# Patient Record
Sex: MALE | Race: WHITE
[De-identification: names, ages, dates, MRNs, and addresses within clinical notes are randomized per-mention and may not be internally consistent; named-entity substitution may affect disease eponyms.]

---

## 2017-12-12 ENCOUNTER — HOSPITAL ENCOUNTER (OUTPATIENT)
Dept: HOSPITAL 75 - RAD | Age: 47
End: 2017-12-12
Attending: NURSE PRACTITIONER
Payer: SELF-PAY

## 2017-12-12 DIAGNOSIS — G62.9: ICD-10-CM

## 2017-12-12 DIAGNOSIS — M25.511: ICD-10-CM

## 2017-12-12 DIAGNOSIS — M25.512: ICD-10-CM

## 2017-12-12 DIAGNOSIS — M54.2: Primary | ICD-10-CM

## 2017-12-12 PROCEDURE — 72141 MRI NECK SPINE W/O DYE: CPT

## 2017-12-12 NOTE — DIAGNOSTIC IMAGING REPORT
PROCEDURE: MR imaging cervical spine without contrast.



TECHNIQUE: Multiplanar, multisequence MR imaging of the cervical

spine was performed without contrast.



INDICATION: Neck pain.



FINDINGS:

There is satisfactory alignment of the posterior spinal line. The

vertebral body heights are preserved. There is a T2 hyperintense

lesion seen within C4 vertebral body measuring 0.6 cm in size.

This is associated with isointense T1 signal and is favored to be

a vertebral body hemangioma. There is disc desiccation at all

levels. The spinal cord has normal caliber, contour and signal.



The foramen magnum and upper cervical canal are widely patent.



C2/3: There is no disc herniation, no spinal canal stenosis. The

foramina demonstrates mild narrowing on the left and no

significant narrowing on the right side.



C3/4: There is no disc herniation and no spinal canal stenosis.

There is uncovertebral and facet hypertrophy resulting in mild

foraminal stenosis bilaterally.



C4/5: There is no disc herniation. No spinal canal stenosis. The

foramina demonstrate no significant stenosis.



C5/6: There is no significant disc herniation. No spinal canal

stenosis. No foraminal narrowing.



C6/7: There is a spur disc complex with minimal spinal canal

stenosis reducing the AP dimension of the canal to 9.5 mm. No

cord compression. There is foraminal stenosis of moderate degree

on the left and minimal stenosis on the right side.



C7/T1: No disc herniation, no spinal canal or foraminal stenosis.



IMPRESSION: No high-grade stenosis or cord compression at any

level.



Dictated by: 



  Dictated on workstation # [id number]

## 2020-02-07 ENCOUNTER — HOSPITAL ENCOUNTER (EMERGENCY)
Dept: HOSPITAL 75 - ER | Age: 50
LOS: 1 days | Discharge: ANOTHER HOSPITAL | End: 2020-02-08
Payer: SELF-PAY

## 2020-02-07 VITALS — DIASTOLIC BLOOD PRESSURE: 99 MMHG | SYSTOLIC BLOOD PRESSURE: 151 MMHG

## 2020-02-07 VITALS — DIASTOLIC BLOOD PRESSURE: 115 MMHG | SYSTOLIC BLOOD PRESSURE: 159 MMHG

## 2020-02-07 VITALS — HEIGHT: 68.9 IN | WEIGHT: 273.37 LBS | BODY MASS INDEX: 40.49 KG/M2

## 2020-02-07 VITALS — SYSTOLIC BLOOD PRESSURE: 160 MMHG | DIASTOLIC BLOOD PRESSURE: 90 MMHG

## 2020-02-07 DIAGNOSIS — A41.9: Primary | ICD-10-CM

## 2020-02-07 DIAGNOSIS — J36: ICD-10-CM

## 2020-02-07 DIAGNOSIS — J39.8: ICD-10-CM

## 2020-02-07 LAB
ALBUMIN SERPL-MCNC: 4.1 GM/DL (ref 3.2–4.5)
ALP SERPL-CCNC: 95 U/L (ref 40–136)
ALT SERPL-CCNC: 17 U/L (ref 0–55)
AMORPH SED URNS QL MICRO: (no result) /LPF
APTT BLD: 28 SEC (ref 24–35)
APTT PPP: YELLOW S
BACTERIA #/AREA URNS HPF: (no result) /HPF
BASOPHILS # BLD AUTO: 0 10^3/UL (ref 0–0.1)
BASOPHILS NFR BLD AUTO: 0 % (ref 0–10)
BILIRUB SERPL-MCNC: 1.5 MG/DL (ref 0.1–1)
BILIRUB UR QL STRIP: (no result)
BUN/CREAT SERPL: 7
CALCIUM SERPL-MCNC: 9.3 MG/DL (ref 8.5–10.1)
CHLORIDE SERPL-SCNC: 100 MMOL/L (ref 98–107)
CO2 SERPL-SCNC: 22 MMOL/L (ref 21–32)
CREAT SERPL-MCNC: 0.94 MG/DL (ref 0.6–1.3)
EOSINOPHIL # BLD AUTO: 0 10^3/UL (ref 0–0.3)
EOSINOPHIL NFR BLD AUTO: 0 % (ref 0–10)
ERYTHROCYTE [DISTWIDTH] IN BLOOD BY AUTOMATED COUNT: 13.5 % (ref 10–14.5)
FIBRINOGEN PPP-MCNC: CLEAR MG/DL
GFR SERPLBLD BASED ON 1.73 SQ M-ARVRAT: > 60 ML/MIN
GLUCOSE SERPL-MCNC: 350 MG/DL (ref 70–105)
GLUCOSE UR STRIP-MCNC: (no result) MG/DL
HCT VFR BLD CALC: 46 % (ref 40–54)
HGB BLD-MCNC: 16 G/DL (ref 13.3–17.7)
INR PPP: 1.2 (ref 0.8–1.4)
KETONES UR QL STRIP: (no result)
LEUKOCYTE ESTERASE UR QL STRIP: NEGATIVE
LYMPHOCYTES # BLD AUTO: 2.3 X 10^3 (ref 1–4)
LYMPHOCYTES NFR BLD AUTO: 14 % (ref 12–44)
MANUAL DIFFERENTIAL PERFORMED BLD QL: YES
MCH RBC QN AUTO: 28 PG (ref 25–34)
MCHC RBC AUTO-ENTMCNC: 35 G/DL (ref 32–36)
MCV RBC AUTO: 81 FL (ref 80–99)
MONOCYTES # BLD AUTO: 1.7 X 10^3 (ref 0–1)
MONOCYTES NFR BLD AUTO: 11 % (ref 0–12)
MONOCYTES NFR BLD: 10 %
NEUTROPHILS # BLD AUTO: 12.5 X 10^3 (ref 1.8–7.8)
NEUTROPHILS NFR BLD AUTO: 76 % (ref 42–75)
NEUTS BAND NFR BLD MANUAL: 80 %
NITRITE UR QL STRIP: NEGATIVE
PH UR STRIP: 5.5 [PH] (ref 5–9)
PLATELET # BLD: 341 10^3/UL (ref 130–400)
PMV BLD AUTO: 10.3 FL (ref 7.4–10.4)
POTASSIUM SERPL-SCNC: 3.4 MMOL/L (ref 3.6–5)
PROT SERPL-MCNC: 8.1 GM/DL (ref 6.4–8.2)
PROT UR QL STRIP: (no result)
PROTHROMBIN TIME: 15.2 SEC (ref 12.2–14.7)
RBC #/AREA URNS HPF: (no result) /HPF
RBC MORPH BLD: NORMAL
SODIUM SERPL-SCNC: 135 MMOL/L (ref 135–145)
SP GR UR STRIP: 1.02 (ref 1.02–1.02)
VARIANT LYMPHS NFR BLD MANUAL: 10 %
WBC # BLD AUTO: 16.6 10^3/UL (ref 4.3–11)
WBC #/AREA URNS HPF: (no result) /HPF

## 2020-02-07 PROCEDURE — 82962 GLUCOSE BLOOD TEST: CPT

## 2020-02-07 PROCEDURE — 81000 URINALYSIS NONAUTO W/SCOPE: CPT

## 2020-02-07 PROCEDURE — 36415 COLL VENOUS BLD VENIPUNCTURE: CPT

## 2020-02-07 PROCEDURE — 96375 TX/PRO/DX INJ NEW DRUG ADDON: CPT

## 2020-02-07 PROCEDURE — 85027 COMPLETE CBC AUTOMATED: CPT

## 2020-02-07 PROCEDURE — 85610 PROTHROMBIN TIME: CPT

## 2020-02-07 PROCEDURE — 71045 X-RAY EXAM CHEST 1 VIEW: CPT

## 2020-02-07 PROCEDURE — 96372 THER/PROPH/DIAG INJ SC/IM: CPT

## 2020-02-07 PROCEDURE — 96367 TX/PROPH/DG ADDL SEQ IV INF: CPT

## 2020-02-07 PROCEDURE — 96365 THER/PROPH/DIAG IV INF INIT: CPT

## 2020-02-07 PROCEDURE — 80053 COMPREHEN METABOLIC PANEL: CPT

## 2020-02-07 PROCEDURE — 83605 ASSAY OF LACTIC ACID: CPT

## 2020-02-07 PROCEDURE — 87040 BLOOD CULTURE FOR BACTERIA: CPT

## 2020-02-07 PROCEDURE — 85730 THROMBOPLASTIN TIME PARTIAL: CPT

## 2020-02-07 PROCEDURE — 70491 CT SOFT TISSUE NECK W/DYE: CPT

## 2020-02-07 PROCEDURE — 96361 HYDRATE IV INFUSION ADD-ON: CPT

## 2020-02-07 PROCEDURE — 87088 URINE BACTERIA CULTURE: CPT

## 2020-02-07 PROCEDURE — 85007 BL SMEAR W/DIFF WBC COUNT: CPT

## 2020-02-07 NOTE — DIAGNOSTIC IMAGING REPORT
Indication: Pharyngitis



Portable upright AP view of the chest is obtained.



COMPARISON: No previous study is available for comparison at this

time.



FINDINGS: Heart size and pulmonary vasculature are within normal

limits, and the lungs are clear, bilaterally.  



IMPRESSION: Unremarkable chest.   



Dictated by: 



  Dictated on workstation # [id number]

## 2020-02-07 NOTE — DIAGNOSTIC IMAGING REPORT
PROCEDURE: CT neck soft tissue with contrast.



TECHNIQUE: Multiple contiguous axial images were obtained through

the neck after the administration of contrast. Auto Exposure

Controls were utilized during the CT exam to meet ALARA standards

for radiation dose reduction. 



INDICATION: Pharyngitis and fever.



FINDINGS: The findings are unremarkable at the skull base.

Adenoidal tissue and the uvula are mildly prominent. The

epiglottis is unremarkable. No oropharyngeal abnormality is

identified; however, there is moderate mural thickening to the

right of midline at the hypopharynx which effaces the right

piriform sinus and causes leftward deviation and distortion of

the airway to the level of the vocal cords. Area of presumed

cellulitis and phlegmon measures approximately 3.9 x 1.9 cm with

several tiny low-density foci which could represent very early

abscesses. There are also prominent deep cervical lymph nodes,

greater on the right. Right retro-mandibular nodule reaches 1.2

cm in diameter. Great vessels in the neck appear to be widely

patent. No other focal inflammation or organized fluid collection

is identified.



IMPRESSION: Right hypopharyngeal cellulitis with probable

phlegmon and possible early abscess formation distorting the

right piriform sinus and airway at the level of the vestibule.

Vocal cords appear to be intact. Direct visualization would be of

use. 



Dictated by: 



  Dictated on workstation # [id number]

## 2020-02-07 NOTE — NUR
informed pt/family of anticipated wait time for recieving bed assignment from 
[facility]. no needs at this time. bp cuff exchanged. call light in reach.

## 2020-02-07 NOTE — ED EENT
History of Present Illness


General


Chief Complaint:  Oral/Throat Problems


Stated Complaint:  THROAT PROBLEMS


Nursing Triage Note:  


patient complaint of abscess of throat, [facility] walk in clinic. recieved antibiotic 


shot, and po antibiotics. pt trouble swalling, trouble breathing. stated rt 


side, swelling. Fever of 102 at home


Source:  patient, spouse


Exam Limitations:  no limitations





History of Present Illness


Date Seen by Provider:  2020


Time Seen by Provider:  17:51


Initial Comments


The patient presents to ER from home with his wife and chief complaint of 2 days

of progressively worsening swelling and pain 8 out of 10 presently in his right 

anterior throat causing pressure on his windpipe and making it impossible to 

swallow his own secretions since this afternoon. Yesterday the [facility] urgent care 

prescribed him Augmentin with return precautions. Since he developed fever 101 

this afternoon he went to the ER per instructions. No previous cysts or 

abscesses. He has Crohn's not on any meds. He had Augmenting 1 tablet this AM 

and last oral intake was around noon. No local GI Dr. Never been to ENT. No SOA.

Vocal changes since yesterday. Had a cholecystectomoy and appendectomy. Not on 

any other meds. PCP at [facility].





Allergies and Home Medications


Allergies


Coded Allergies:  


     No Known Drug Allergies (Unverified , 13)





Patient Home Medication List


Home Medication List Reviewed:  Yes





Review of Systems


Review of Systems


Constitutional:  chills; No diaphoresis; fever, malaise


Eyes:  Denies Blindness, Denies Blurred Vision


Ears:  Denies Dizziness, Denies Pain


Nose:  denies clots, denies congestion


Mouth:  see HPI; denies clots, denies loose teeth


Throat:  pain, swelling (r); denies discharge; hoarse, painful swallowing, 

difficulty with fluids


Respiratory:  No cough, No short of breath


Cardiovascular:  No chest pain, No edema


Gastrointestinal:  No abdominal pain, No nausea


Neurological:  Denies Depressed





All Other Systems Reviewed


Negative Unless Noted:  Yes





Past Medical-Social-Family Hx


Patient Social History


Alcohol Use:  Denies Use


Recreational Drug Use:  No


Smoking Status:  Never a Smoker


Recent Foreign Travel:  No


Contact w/Someone Who Travel:  No


Recent Infectious Disease Expo:  No





Past Medical History


Reproductive Disorders:  No


Sexually Transmitted Disease:  No


HIV/AIDS:  No


Crohns Disease


Adverse Reaction/Blood Tranf:  No





Physical Exam


Vital Signs





Vital Signs - First Documented








 20





 17:45


 


Temp 37.2


 


Pulse 150


 


Resp 20








Height, Weight, BMI


Height: '"


Weight: lbs. oz. kg; 40.00 BMI


Method:


General Appearance:  WD/WN, mild distress


Eyes:  bilateral eye normal inspection, bilateral eye PERRL, bilateral eye EOMI


Ears:  bilateral ear auricle normal, bilateral ear canal normal, bilateral ear 

TM normal


Nose:  normal inspection; No active bleeding, No discharge


Mouth/Throat:  normal mouth inspection, pharynx normal, dental tenderness; No 

excessive drooling


Neck:  lymphadenopathy (R), tender lateral (r)


Cardiovascular:  normal peripheral pulses, regular rate, rhythm, no edema


Respiratory:  lungs clear, normal breath sounds, no respiratory distress, no 

accessory muscle use


Neurologic/Psychiatric:  alert, normal mood/affect, oriented x 3


Skin:  normal color, warm/dry





Progress/Results/Core Measures


Results/Orders


Lab Results





Laboratory Tests








Test


 20


18:05 20


19:13 20


20:20 20


00:42 Range/Units


 


 


White Blood Count


 16.6 H


 


 


 


 4.3-11.0


10^3/uL


 


Red Blood Count


 5.66 


 


 


 


 4.35-5.85


10^6/uL


 


Hemoglobin 16.0     13.3-17.7  G/DL


 


Hematocrit 46     40-54  %


 


Mean Corpuscular Volume 81     80-99  FL


 


Mean Corpuscular Hemoglobin 28     25-34  PG


 


Mean Corpuscular Hemoglobin


Concent 35 


 


 


 


 32-36  G/DL





 


Red Cell Distribution Width 13.5     10.0-14.5  %


 


Platelet Count


 341 


 


 


 


 130-400


10^3/uL


 


Mean Platelet Volume 10.3     7.4-10.4  FL


 


Neutrophils (%) (Auto) 76 H    42-75  %


 


Lymphocytes (%) (Auto) 14     12-44  %


 


Monocytes (%) (Auto) 11     0-12  %


 


Eosinophils (%) (Auto) 0     0-10  %


 


Basophils (%) (Auto) 0     0-10  %


 


Neutrophils # (Auto) 12.5 H    1.8-7.8  X 10^3


 


Lymphocytes # (Auto) 2.3     1.0-4.0  X 10^3


 


Monocytes # (Auto) 1.7 H    0.0-1.0  X 10^3


 


Eosinophils # (Auto)


 0.0 


 


 


 


 0.0-0.3


10^3/uL


 


Basophils # (Auto)


 0.0 


 


 


 


 0.0-0.1


10^3/uL


 


Neutrophils % (Manual) 80      %


 


Lymphocytes % (Manual) 10      %


 


Monocytes % (Manual) 10      %


 


Blood Morphology Comment NORMAL      


 


Prothrombin Time 15.2 H    12.2-14.7  SEC


 


INR Comment 1.2     0.8-1.4  


 


Activated Partial


Thromboplast Time 28 


 


 


 


 24-35  SEC





 


Sodium Level 135     135-145  MMOL/L


 


Potassium Level 3.4 L    3.6-5.0  MMOL/L


 


Chloride Level 100       MMOL/L


 


Carbon Dioxide Level 22     21-32  MMOL/L


 


Anion Gap 13     5-14  MMOL/L


 


Blood Urea Nitrogen 7     7-18  MG/DL


 


Creatinine


 0.94 


 


 


 


 0.60-1.30


MG/DL


 


Estimat Glomerular Filtration


Rate > 60 


 


 


 


  





 


BUN/Creatinine Ratio 7      


 


Glucose Level 350 H      MG/DL


 


Lactic Acid Level


 2.18 *H


 


 1.57 


 


 0.50-2.00


MMOL/L


 


Calcium Level 9.3     8.5-10.1  MG/DL


 


Corrected Calcium 9.2     8.5-10.1  MG/DL


 


Total Bilirubin 1.5 H    0.1-1.0  MG/DL


 


Aspartate Amino Transf


(AST/SGOT) 13 


 


 


 


 5-34  U/L





 


Alanine Aminotransferase


(ALT/SGPT) 17 


 


 


 


 0-55  U/L





 


Alkaline Phosphatase 95       U/L


 


Total Protein 8.1     6.4-8.2  GM/DL


 


Albumin 4.1     3.2-4.5  GM/DL


 


Urine Color  YELLOW     


 


Urine Clarity  CLEAR     


 


Urine pH  5.5    5-9  


 


Urine Specific Gravity  1.025 H   1.016-1.022  


 


Urine Protein  1+ H   NEGATIVE  


 


Urine Glucose (UA)  2+ H   NEGATIVE  


 


Urine Ketones  1+ H   NEGATIVE  


 


Urine Nitrite  NEGATIVE    NEGATIVE  


 


Urine Bilirubin  1+ H   NEGATIVE  


 


Urine Urobilinogen  1.0    < = 1.0  MG/DL


 


Urine Leukocyte Esterase  NEGATIVE    NEGATIVE  


 


Urine RBC (Auto)  TRACE-I    NEGATIVE  


 


Urine RBC  0-2     /HPF


 


Urine WBC  0-2     /HPF


 


Urine Crystals  PRESENT H    /LPF


 


Urine Amorphous Sediment


 


 FEW ZHANNA


URATES H 


 


  /LPF





 


Urine Bacteria  TRACE     /HPF


 


Urine Casts  NONE     /LPF


 


Urine Mucus  MODERATE H    /LPF


 


Urine Culture Indicated  NO     








My Orders





Orders - PABLO BARROSO


Ct Neck (Soft Tissue) W (20 17:57)


Cbc With Automated Diff (20 17:57)


Comprehensive Metabolic Panel (20 17:57)


Blood Culture (20 17:57)


Sputum Culture (20 17:57)


Urinalysis (20 17:57)


Urine Culture (20 17:57)


Protime With Inr (20 17:57)


Partial Thromboplastin Time (20 17:57)


Chest 1 View, Ap/Pa Only (20 17:57)


Ed Iv/Invasive Line Start (20 17:57)


Ed Iv/Invasive Line Start (20 17:57)


Vital Signs Adult Sepsis Patie Q15M (20 17:57)


O2 (20 17:57)


Remove Rings In Anticipation O (20 17:57)


Lactic Acid Analyzer (20 17:57)


Ns Iv 1000 Ml (Sodium Chloride 0.9%) (20 17:57)


Piperacillin Sodium/Tazobactam (Zosyn Vi (20 18:00)


Vancomycin Injection (Vancomycin Injecti (20 18:00)


Ed Iv/Invasive Line Start (20 17:57)


Ns Iv 1000 Ml (Sodium Chloride 0.9%) (20 17:57)


Fentanyl  Injection (Sublimaze Injection (20 18:00)


Manual Differential (20 18:05)


Insulin (Regular) Human (Humulin R (Per (20 18:45)


Iohexol Injection (Omnipaque 350 Mg/Ml 1 (20 19:00)


Received Contrast (Hold Metformin- Contr (20 19:00)


Ns (Ivpb) (Sodium Chloride 0.9% Ivpb Bag (20 19:00)


Iohexol Injection (Omnipaque 350 Mg/Ml 1 (20 19:45)


Received Contrast (Hold Metformin- Contr (20 19:45)


Ns (Ivpb) (Sodium Chloride 0.9% Ivpb Bag (20 19:45)


Ketorolac Injection (Toradol Injection) (20 21:15)


Dexamethasone Injection (Decadron Inject (20 22:00)


Ns W/Kcl 20 Meq/L (Ns Iv W/Kcl 20 Meq/L) (20 22:30)


Accucheck Stat ONCE (20 00:43)





Medications Given in ED





Current Medications








 Medications  Dose


 Ordered  Sig/Iris


 Route  Start Time


 Stop Time Status Last Admin


Dose Admin


 


 Dexamethasone


 Sodium Phosphate  10 mg  ONCE  ONCE


 IV  20 22:00


 20 22:01 DC 20 22:03


10 MG


 


 Fentanyl Citrate  50 mcg  ONCE  ONCE


 IVP  20 18:00


 20 18:03 DC 20 18:21


50 MCG


 


 Insulin Human


 Regular  5 unit  ONCE  ONCE


 SC  20 18:45


 20 18:46 DC 20 18:58


5 UNIT


 


 Iohexol  75 ml  ONCE  ONCE


 IV  20 19:45


 20 19:46 DC 20 19:45


75 ML


 


 Ketorolac


 Tromethamine  30 mg  ONCE  ONCE


 IVP  20 21:15


 20 21:16 DC 20 21:26


30 MG


 


 Piperacillin Sod/


 Tazobactam Sod


 4.5 gm/Sodium


 Chloride  100 ml @ 


 200 mls/hr  ONCE  ONCE


 IV  20 18:00


 20 18:29 DC 20 18:58


200 MLS/HR


 


 Potassium


 Chloride/Sodium


 Chloride  1,000 ml @ 


 250 mls/hr  Q4H ONCE


 IV  20 22:30


 20 02:29  20 22:57


250 MLS/HR


 


 Sodium Chloride  100 ml  ONCE  ONCE


 IV  20 19:45


 20 19:46 DC 20 19:46


80 ML


 


 Vancomycin HCl


 1000 mg/Sodium


 Chloride  250 ml @ 


 250 mls/hr  ONCE  ONCE


 IV  20 18:00


 20 18:59 DC 20 19:50


250 MLS/HR








Vital Signs/I&O











 20





 17:45 19:04 19:04 20:14


 


Temp 37.2   


 


Pulse 150 137 137 125


 


Resp 20 17 17 22


 


B/P (MAP)  159/115 159/115 (130) 151/99 (116)


 


Pulse Ox  93 93 93


 


O2 Delivery  Room Air Room Air Room Air


 


    





 20  





 22:05 22:26  


 


Temp  37.6  


 


Pulse 124 112  


 


Resp 19 20  


 


B/P (MAP) 160/90 (113) 160/90  


 


Pulse Ox 95 95  


 


O2 Delivery Room Air Room Air  











Progress


Progress Note #1:  


   Time:  18:14


Progress Note


Febrile tachycardia: Septic workup. 2 L based on an ideal body weight of 92 kg 

adjusted. We'll select Zosyn and vancomycin and get a CT of the neck with IV 

contrast. Fentanyl 50 migrans for pain.


Progress Note #2:  


   Time:  21:13


Progress Note


The patient felt feverish so staff checked his temperature at 102.9 tympanic. We

have offered rectal Tylenol or Toradol noting there is a small possibility this 

could irritate his Crohn's. He has been in remission with his Crohn's for many 

years and not on any medications to control it and he has stated he would prefer

Toradol.


Progress Note #3:  


   Time:  21:55


Progress Note


Patient was seen and examined by ear nose and throat surgeon Dr. Medrano who feels

like the phlegmon is not amenable to surgical drainage at this time because the 

sepsis would recommend hospitalization. He would like since the patient is 

having some airway compression and vocal changes to have the patient in the ICU.

Unfortunately we are on ICU diversion. We will seek care at [place], [place].





The patient is comfortable at this time.





Diagnostic Imaging





   Diagonstic Imaging:  CT (With IV contrast)


   Plain Films/CT/US/NM/MRI:  other (soft tissue neck)


Comments


NAME:   MURRAY PICKETT


MED REC#:   [id number]


ACCOUNT#:   [id number]


PT STATUS:   REG ER


:   1970


PHYSICIAN:   PABLO BARROSO MD


ADMIT DATE:   20/ER


                                  ***Signed***


Date of Exam:20





CT NECK (SOFT TISSUE) W





PROCEDURE: CT neck soft tissue with contrast.





TECHNIQUE: Multiple contiguous axial images were obtained through


the neck after the administration of contrast. Auto Exposure


Controls were utilized during the CT exam to meet ALARA standards


for radiation dose reduction. 





INDICATION: Pharyngitis and fever.





FINDINGS: The findings are unremarkable at the skull base.


Adenoidal tissue and the uvula are mildly prominent. The


epiglottis is unremarkable. No oropharyngeal abnormality is


identified; however, there is moderate mural thickening to the


right of midline at the hypopharynx which effaces the right


piriform sinus and causes leftward deviation and distortion of


the airway to the level of the vocal cords. Area of presumed


cellulitis and phlegmon measures approximately 3.9 x 1.9 cm with


several tiny low-density foci which could represent very early


abscesses. There are also prominent deep cervical lymph nodes,


greater on the right. Right retro-mandibular nodule reaches 1.2


cm in diameter. Great vessels in the neck appear to be widely


patent. No other focal inflammation or organized fluid collection


is identified.





IMPRESSION: Right hypopharyngeal cellulitis with probable


phlegmon and possible early abscess formation distorting the


right piriform sinus and airway at the level of the vestibule.


Vocal cords appear to be intact. Direct visualization would be of


use. 





Dictated by: 





  Dictated on workstation # [id number]





Dict:   20


Trans:   20


PJE [id number]





Interpreted by:     VANGIE PERRY MD


Electronically signed by: VANGIE PERRY MD 20


   Reviewed:  Reviewed by Me








   Diagonstic Imaging:  Xray


   Plain Films/CT/US/NM/MRI:  chest


Comments


NAME:   MURRAY PICKETT


MED REC#:   [id number]


ACCOUNT#:   [id number]


PT STATUS:   REG ER


:   1970


PHYSICIAN:   PABLO BARROSO MD


ADMIT DATE:   20/ER


                                  ***Signed***


Date of Exam:20





CHEST 1 VIEW, AP/PA ONLY





Indication: Pharyngitis





Portable upright AP view of the chest is obtained.





COMPARISON: No previous study is available for comparison at this


time.





FINDINGS: Heart size and pulmonary vasculature are within normal


limits, and the lungs are clear, bilaterally.  





IMPRESSION: Unremarkable chest.   





Dictated by: 





  Dictated on workstation # [id number]





Dict:   20


Trans:   20


TF [id number]





Interpreted by:     VANGIE PERRY MD


Electronically signed by: VANGIE PERRY MD 20


   Reviewed:  Reviewed by Me


Consults :  


   Consulting Physician:  SUKHDEEP MEDRANO MD


Consults Notes


: Discussed the case with Dr. Medrano, ear nose and throat surgeon and he 

agrees to come see the patient.





Departure


Impression





   Primary Impression:  


   Peritonsillar cellulitis


   Additional Impressions:  


   Tracheal deviation


   Sepsis


   Qualified Codes:  A41.9 - Sepsis, unspecified organism


Disposition:   XFER SHT-TRM HOSP


Condition:  Stable





Transfer


Transfer Reason:  Diversion (ICU)


Time Spoke to Accepting Phy:  22:30


Transfer Progress Notes


: Dr Briones, intensivist at [place], [place] says he is happy to 

consult but declined to admit. He wants us to go through ENT and make sure that 

they are willing to accept the patient. He said it would be reasonable for 

either ENT to admit or the hospitalist to admit and he is happy to consult.


: Discussed the case with Dr. Erazo, ear nose and throat surgeon. She agrees 

to consult on the patient but will not be admitting and defers to the 

hospitalist. She agrees with the plan thus far.


: Discussed the case with Dr. Vargas, hospitalist and he agrees to admit the

patient to the unit.


Transfer Time:  00:45


Transfer Facility:  


[place], [place]


Method of Transfer:  EMS





Departure-Patient Inst.


Referrals:  


[facility] (PCP/Family)


Primary Care Physician











PABLO BARROSO                  2020 18:09

## 2020-02-08 VITALS — SYSTOLIC BLOOD PRESSURE: 154 MMHG | DIASTOLIC BLOOD PRESSURE: 103 MMHG

## 2021-01-10 ENCOUNTER — HOSPITAL ENCOUNTER (INPATIENT)
Dept: HOSPITAL 75 - ER | Age: 51
LOS: 9 days | Discharge: HOME | DRG: 871 | End: 2021-01-19
Attending: INTERNAL MEDICINE | Admitting: INTERNAL MEDICINE
Payer: SELF-PAY

## 2021-01-10 VITALS — WEIGHT: 302.25 LBS | BODY MASS INDEX: 40.06 KG/M2 | HEIGHT: 72.99 IN

## 2021-01-10 VITALS — DIASTOLIC BLOOD PRESSURE: 59 MMHG | SYSTOLIC BLOOD PRESSURE: 125 MMHG

## 2021-01-10 DIAGNOSIS — E87.6: ICD-10-CM

## 2021-01-10 DIAGNOSIS — R45.1: ICD-10-CM

## 2021-01-10 DIAGNOSIS — H66.93: ICD-10-CM

## 2021-01-10 DIAGNOSIS — R32: ICD-10-CM

## 2021-01-10 DIAGNOSIS — Z79.2: ICD-10-CM

## 2021-01-10 DIAGNOSIS — I10: ICD-10-CM

## 2021-01-10 DIAGNOSIS — A41.9: Primary | ICD-10-CM

## 2021-01-10 DIAGNOSIS — U07.1: ICD-10-CM

## 2021-01-10 DIAGNOSIS — Z91.19: ICD-10-CM

## 2021-01-10 DIAGNOSIS — N17.9: ICD-10-CM

## 2021-01-10 DIAGNOSIS — E86.0: ICD-10-CM

## 2021-01-10 DIAGNOSIS — E66.9: ICD-10-CM

## 2021-01-10 DIAGNOSIS — E11.10: ICD-10-CM

## 2021-01-10 DIAGNOSIS — J96.01: ICD-10-CM

## 2021-01-10 DIAGNOSIS — G47.33: ICD-10-CM

## 2021-01-10 DIAGNOSIS — J12.82: ICD-10-CM

## 2021-01-10 LAB
ALBUMIN SERPL-MCNC: 3.7 GM/DL (ref 3.2–4.5)
ALP SERPL-CCNC: 68 U/L (ref 40–136)
ALT SERPL-CCNC: 31 U/L (ref 0–55)
AMYLASE SERPL-CCNC: 26 U/L (ref 25–125)
APTT BLD: 28 SEC (ref 24–35)
APTT PPP: YELLOW S
ARTERIAL PATENCY WRIST A: (no result)
BACTERIA #/AREA URNS HPF: (no result) /HPF
BARBITURATES UR QL: NEGATIVE
BASE EXCESS STD BLDA CALC-SCNC: -2.2 MMOL/L (ref -2.5–2.5)
BASOPHILS # BLD AUTO: 0 10^3/UL (ref 0–0.1)
BASOPHILS NFR BLD AUTO: 0 % (ref 0–10)
BDY SITE: (no result)
BENZODIAZ UR QL SCN: NEGATIVE
BILIRUB SERPL-MCNC: 0.8 MG/DL (ref 0.1–1)
BILIRUB UR QL STRIP: NEGATIVE
BODY TEMPERATURE: 38.4
BUN/CREAT SERPL: 13
CALCIUM SERPL-MCNC: 8.8 MG/DL (ref 8.5–10.1)
CHLORIDE SERPL-SCNC: 99 MMOL/L (ref 98–107)
CK MB SERPL-MCNC: 0.5 NG/ML (ref ?–6.6)
CK SERPL-CCNC: 229 U/L (ref 30–200)
CO2 BLDA CALC-SCNC: 22.3 MMOL/L (ref 21–31)
CO2 SERPL-SCNC: 21 MMOL/L (ref 21–32)
COCAINE UR QL: NEGATIVE
CREAT SERPL-MCNC: 1.52 MG/DL (ref 0.6–1.3)
D DIMER PPP FEU-MCNC: 0.99 UG/ML (ref 0–0.49)
EOSINOPHIL # BLD AUTO: 0 10^3/UL (ref 0–0.3)
EOSINOPHIL NFR BLD AUTO: 0 % (ref 0–10)
ERYTHROCYTE [SEDIMENTATION RATE] IN BLOOD: 20 MM/HR (ref 0–30)
FIBRINOGEN PPP-MCNC: (no result) MG/DL
GFR SERPLBLD BASED ON 1.73 SQ M-ARVRAT: 49 ML/MIN
GLUCOSE SERPL-MCNC: 402 MG/DL (ref 70–105)
GLUCOSE UR STRIP-MCNC: (no result) MG/DL
HCT VFR BLD CALC: 49 % (ref 40–54)
HGB BLD-MCNC: 16.5 G/DL (ref 13.3–17.7)
HYALINE CASTS #/AREA URNS LPF: (no result) /LPF
INHALED O2 FLOW RATE: (no result) L/MIN
INR PPP: 1.2 (ref 0.8–1.4)
KETONES UR QL STRIP: NEGATIVE
LEUKOCYTE ESTERASE UR QL STRIP: NEGATIVE
LIPASE SERPL-CCNC: 41 U/L (ref 8–78)
LYMPHOCYTES # BLD AUTO: 1.3 10^3/UL (ref 1–4)
LYMPHOCYTES NFR BLD AUTO: 18 % (ref 12–44)
MAGNESIUM SERPL-MCNC: 2.2 MG/DL (ref 1.6–2.4)
MANUAL DIFFERENTIAL PERFORMED BLD QL: NO
MCH RBC QN AUTO: 28 PG (ref 25–34)
MCHC RBC AUTO-ENTMCNC: 34 G/DL (ref 32–36)
MCV RBC AUTO: 82 FL (ref 80–99)
METHADONE UR QL SCN: NEGATIVE
METHAMPHETAMINE SCREEN URINE S: NEGATIVE
MONOCYTES # BLD AUTO: 0.5 10^3/UL (ref 0–1)
MONOCYTES NFR BLD AUTO: 7 % (ref 0–12)
NEUTROPHILS # BLD AUTO: 5.3 10^3/UL (ref 1.8–7.8)
NEUTROPHILS NFR BLD AUTO: 75 % (ref 42–75)
NITRITE UR QL STRIP: NEGATIVE
OPIATES UR QL SCN: NEGATIVE
OXYCODONE UR QL: NEGATIVE
PCO2 BLDA: 34 MMHG (ref 35–45)
PH BLDA: 7.42 [PH] (ref 7.37–7.43)
PH UR STRIP: 6 [PH] (ref 5–9)
PLATELET # BLD: 290 10^3/UL (ref 130–400)
PMV BLD AUTO: 11.5 FL (ref 9–12.2)
PO2 BLDA: 56 MMHG (ref 79–93)
POTASSIUM SERPL-SCNC: 2.6 MMOL/L (ref 3.6–5)
PROPOXYPH UR QL: NEGATIVE
PROT SERPL-MCNC: 7.9 GM/DL (ref 6.4–8.2)
PROT UR QL STRIP: (no result)
PROTHROMBIN TIME: 15.2 SEC (ref 12.2–14.7)
RBC #/AREA URNS HPF: (no result) /HPF
SAO2 % BLDA FROM PO2: 85 % (ref 94–100)
SODIUM SERPL-SCNC: 136 MMOL/L (ref 135–145)
SP GR UR STRIP: 1.01 (ref 1.02–1.02)
SQUAMOUS #/AREA URNS HPF: (no result) /HPF
TRICYCLICS UR QL SCN: NEGATIVE
VENTILATION MODE VENT: NO
WBC # BLD AUTO: 7.1 10^3/UL (ref 4.3–11)
WBC #/AREA URNS HPF: (no result) /HPF

## 2021-01-10 PROCEDURE — 82550 ASSAY OF CK (CPK): CPT

## 2021-01-10 PROCEDURE — 85610 PROTHROMBIN TIME: CPT

## 2021-01-10 PROCEDURE — 87040 BLOOD CULTURE FOR BACTERIA: CPT

## 2021-01-10 PROCEDURE — 96374 THER/PROPH/DIAG INJ IV PUSH: CPT

## 2021-01-10 PROCEDURE — 82962 GLUCOSE BLOOD TEST: CPT

## 2021-01-10 PROCEDURE — 94660 CPAP INITIATION&MGMT: CPT

## 2021-01-10 PROCEDURE — 82150 ASSAY OF AMYLASE: CPT

## 2021-01-10 PROCEDURE — 85025 COMPLETE CBC W/AUTO DIFF WBC: CPT

## 2021-01-10 PROCEDURE — 83615 LACTATE (LD) (LDH) ENZYME: CPT

## 2021-01-10 PROCEDURE — 82805 BLOOD GASES W/O2 SATURATION: CPT

## 2021-01-10 PROCEDURE — 85379 FIBRIN DEGRADATION QUANT: CPT

## 2021-01-10 PROCEDURE — 83690 ASSAY OF LIPASE: CPT

## 2021-01-10 PROCEDURE — 94760 N-INVAS EAR/PLS OXIMETRY 1: CPT

## 2021-01-10 PROCEDURE — 36569 INSJ PICC 5 YR+ W/O IMAGING: CPT

## 2021-01-10 PROCEDURE — 85730 THROMBOPLASTIN TIME PARTIAL: CPT

## 2021-01-10 PROCEDURE — 36600 WITHDRAWAL OF ARTERIAL BLOOD: CPT

## 2021-01-10 PROCEDURE — 86308 HETEROPHILE ANTIBODY SCREEN: CPT

## 2021-01-10 PROCEDURE — 96375 TX/PRO/DX INJ NEW DRUG ADDON: CPT

## 2021-01-10 PROCEDURE — 80306 DRUG TEST PRSMV INSTRMNT: CPT

## 2021-01-10 PROCEDURE — 71275 CT ANGIOGRAPHY CHEST: CPT

## 2021-01-10 PROCEDURE — 85652 RBC SED RATE AUTOMATED: CPT

## 2021-01-10 PROCEDURE — 76937 US GUIDE VASCULAR ACCESS: CPT

## 2021-01-10 PROCEDURE — 87081 CULTURE SCREEN ONLY: CPT

## 2021-01-10 PROCEDURE — 87430 STREP A AG IA: CPT

## 2021-01-10 PROCEDURE — 80053 COMPREHEN METABOLIC PANEL: CPT

## 2021-01-10 PROCEDURE — 83874 ASSAY OF MYOGLOBIN: CPT

## 2021-01-10 PROCEDURE — 71045 X-RAY EXAM CHEST 1 VIEW: CPT

## 2021-01-10 PROCEDURE — 94640 AIRWAY INHALATION TREATMENT: CPT

## 2021-01-10 PROCEDURE — 82728 ASSAY OF FERRITIN: CPT

## 2021-01-10 PROCEDURE — 86900 BLOOD TYPING SEROLOGIC ABO: CPT

## 2021-01-10 PROCEDURE — 83735 ASSAY OF MAGNESIUM: CPT

## 2021-01-10 PROCEDURE — 87804 INFLUENZA ASSAY W/OPTIC: CPT

## 2021-01-10 PROCEDURE — 87088 URINE BACTERIA CULTURE: CPT

## 2021-01-10 PROCEDURE — 85027 COMPLETE CBC AUTOMATED: CPT

## 2021-01-10 PROCEDURE — 84100 ASSAY OF PHOSPHORUS: CPT

## 2021-01-10 PROCEDURE — 86901 BLOOD TYPING SEROLOGIC RH(D): CPT

## 2021-01-10 PROCEDURE — 82010 KETONE BODYS QUAN: CPT

## 2021-01-10 PROCEDURE — 84145 PROCALCITONIN (PCT): CPT

## 2021-01-10 PROCEDURE — 94761 N-INVAS EAR/PLS OXIMETRY MLT: CPT

## 2021-01-10 PROCEDURE — 87635 SARS-COV-2 COVID-19 AMP PRB: CPT

## 2021-01-10 PROCEDURE — 83605 ASSAY OF LACTIC ACID: CPT

## 2021-01-10 PROCEDURE — 93005 ELECTROCARDIOGRAM TRACING: CPT

## 2021-01-10 PROCEDURE — 80048 BASIC METABOLIC PNL TOTAL CA: CPT

## 2021-01-10 PROCEDURE — 86141 C-REACTIVE PROTEIN HS: CPT

## 2021-01-10 PROCEDURE — 83880 ASSAY OF NATRIURETIC PEPTIDE: CPT

## 2021-01-10 PROCEDURE — 81000 URINALYSIS NONAUTO W/SCOPE: CPT

## 2021-01-10 PROCEDURE — 82553 CREATINE MB FRACTION: CPT

## 2021-01-10 PROCEDURE — 36415 COLL VENOUS BLD VENIPUNCTURE: CPT

## 2021-01-10 PROCEDURE — 83036 HEMOGLOBIN GLYCOSYLATED A1C: CPT

## 2021-01-10 PROCEDURE — 93041 RHYTHM ECG TRACING: CPT

## 2021-01-10 NOTE — DIAGNOSTIC IMAGING REPORT
INDICATION: Hypoxia and fever.



EXAMINATION: Frontal chest was obtained at 8:18 p.m. 



COMPARISON: 7:20 p.m.



FINDINGS: Heart is borderline in size. There are patchy

infiltrates in the mid to lower lung field on both sides

suspicious for atypical pneumonia. There is no pneumothorax or

pleural fluid.



IMPRESSION: Patchy groundglass infiltrates in the mid to lower

lung field on both sides is suspicious for atypical pneumonia.

Follow-up is recommended. 



Dictated by: 



  Dictated on workstation # [id number]

## 2021-01-10 NOTE — ED GENERAL
General


Chief Complaint:  Fever-Adult/Adol


Stated Complaint:  FEVER/LETHARGIC


Source of Information:  EMS, Old Records (ALL PMH IS FROM OLD RECORDS-ONE VISIT 

IN 2020 AND ONE VISIT IN 2013)


Exam Limitations:  Other (PT VERY LETHARGIC AND NOT TALKING MUCH)





History of Present Illness


Date Seen by Provider:  Wilber 10, 2021


Time Seen by Provider:  19:20


Initial Comments


PT ARRIVES VIA EMS FROM HOME


FAMILY CALLED EMS FOR PT WITH LETHARGY ALL DAY TODAY


PT REPORTEDLY WAS SEEN BY DR. RM YESTERDAY AND DX WITH BILATERAL EAR 

INFECTION AND GIVEN RX'S FOR AMOXIL, ANTIVERT, ZOFRAN


EMS REPORT THAT PT HAD TEMP .2--FAMILY UNAWARE, AND HAVE NOT GIVEN PT 

ANYTHING FOR SYMPTOMS


O2 SAT 86% ON ROOM AIR--NO REPORTED HISTORY OF RESPIRATORY PROBLEMS OR HX OF 

NEED FOR OXYGEN 


PT IS DIABETIC, BUT DOES NOT TAKE MEDICATION OR CHECK BLOOD GLUCOSE OR FOLLOW 

DIET--BLOOD GLUCOSE >300 FOR EMS


PT HAS HAD NAUSEA/VOMITING/DIARRHEA, BUT IS UNKNOWN HOW MANY EPISODES


PT WAS INCONTINENT OF URINE AT HOME





HAS NOT TAKEN ANYTHING FOR SYMPTOMS





NO OTHER INFORMATION IS OBTAINABLE AT THIS TIME. 





WIFE WAS CONTACTED LATER AND REPORTS THAT PT BEGAN HAVING SYMPTOMS APPROXIMATELY

1 WEEK AGO, WITH DECREASED INTAKE THE LAST 4-5 DAYS, AND TODAY VERY LETHARGIC 

AND COULD NOT GET OUT OF BED ALL DAY TODAY


NO REPORTED COUGH


UNAWARE THAT PT HAD FEVER. 


WIFE STATES HE DOES NOT GO TO DR/DOES NOT HAVE A DR. AND WILL NOT TAKE 

MEDICATION EVEN IF IT IS PRESCRIBED,  AND CANNOT AFFORD ANY MEDICATION AS HE 

DOES NOT HAVE INSURANCE. 


SHE REPORTS THAT PT IS TO BE A FULL CODE


SHE REPORTS THAT ONLY SHE AND PT LIVE IN  HOME, AND NO SICK CONTACTS AND SHE HAS

NOT BEEN ILL





NO 





Allergies and Home Medications


Allergies


Coded Allergies:  


     No Known Drug Allergies (Unverified , 1/24/13)





Patient Home Medication List


Home Medication List Reviewed:  Yes





Review of Systems


Review of Systems


Constitutional:  see HPI, fever, malaise, weakness, other (VERY LIMITED)


Respiratory:  see HPI (UNKNOWN IF HE HAS HAD COUGH OR SHORTNESS OF BREATH)


Gastrointestinal:  see HPI, diarrhea, loss of appetite, nausea, vomiting





Past Medical-Social-Family Hx


Past Med/Social Hx:  Reviewed and Corrections made


Patient Social History


Alcohol Use:  Denies Use


Recreational Drug Use:  No


Smoking Status:  Never a Smoker


Recent Hopitalizations:  No





Seasonal Allergies


Seasonal Allergies:  No





Past Medical History


Surgeries:  Yes (appendectomy, partial colecomy)


Abdominal, Appendectomy, Bowel Surgery, Gallbladder


Respiratory:  No


Cardiac:  Yes


Hypertension


Neurological:  No


Reproductive Disorders:  No


Sexually Transmitted Disease:  No


HIV/AIDS:  No


Genitourinary:  No


Gastrointestinal:  Yes (APPY;JUAN; PARTIAL COLECTOMY)


Crohns Disease, Gall Bladder Disease


Musculoskeletal:  No


Endocrine:  Yes (NON-COMPLIANT-DOES NOT TAKE MEDS, CHECK GLUCOSE OR FOLLOW DIET)


Diabetes, Non-Insulin dep


HEENT:  Yes (02/2020--PERTONSILLAR ABSCESS/TRANSFERRED TO [facility].NO SURGERY )


Cancer:  No


Psychosocial:  No


Integumentary:  No


Blood Disorders:  No


Adverse Reaction/Blood Tranf:  No





Physical Exam


Vital Signs


Capillary Refill :


Height, Weight, BMI


Height: '"


Weight: lbs. oz. kg; 40.00 BMI


Method:


General Appearance:  Other (VERY LETHARGIC, MINIMALLY VERBAL, SKIN VERY WARM AND

FLUSHED)


HEENT:  PERRL/EOMI


Neck:  Normal Inspection


Respiratory:  Normal Breath Sounds (BUT DECREASED AERATION IN BASES), No 

Accessory Muscle Use, No Respiratory Distress


Cardiovascular:  No Edema, No Murmur, Tachycardia (130'S)


Gastrointestinal:  Non Tender, Soft


Extremity:  No Pedal Edema


Neurologic/Psychiatric:  Other (LETHARGIC, GROSS MOTOR/SENSORY INTACT, MINIMALLY

VERBAL --ANSWERS A FEW YES/NO QUESTIONS. )


Skin:  Other (FLUSHED, VERY WARM, )





Focused Exam


Lactate Level


1/10/21 19:43: Lactic Acid Level 3.23*H





Lactic Acid Level





Laboratory Tests








Test


 1/10/21


19:43


 


Lactic Acid Level


 3.23 MMOL/L


(0.50-2.00)  *H











Progress/Results/Core Measures


Suspected Sepsis


SIRS


Temperature: 


Pulse:  


Respiratory Rate: 


 


Laboratory Tests


1/10/21 19:43: White Blood Count 7.1


Blood Pressure  / 


Mean: 


 





1/10/21 19:43: Lactic Acid Level 3.23*H


Laboratory Tests


1/10/21 19:43: 


Creatinine 1.52H, INR Comment 1.2, Platelet Count 290, Total Bilirubin 0.8








Results/Orders


Lab Results





Laboratory Tests








Test


 1/10/21


19:43 1/10/21


20:00 1/10/21


20:20 Range/Units


 


 


White Blood Count


 7.1 


 


 


 4.3-11.0


10^3/uL


 


Red Blood Count


 6.00 H


 


 


 4.30-5.52


10^6/uL


 


Hemoglobin 16.5    13.3-17.7  g/dL


 


Hematocrit 49    40-54  %


 


Mean Corpuscular Volume 82    80-99  fL


 


Mean Corpuscular Hemoglobin 28    25-34  pg


 


Mean Corpuscular Hemoglobin


Concent 34 


 


 


 32-36  g/dL





 


Red Cell Distribution Width 12.6    10.0-14.5  %


 


Platelet Count


 290 


 


 


 130-400


10^3/uL


 


Mean Platelet Volume 11.5    9.0-12.2  fL


 


Immature Granulocyte % (Auto) 0     %


 


Neutrophils (%) (Auto) 75    42-75  %


 


Lymphocytes (%) (Auto) 18    12-44  %


 


Monocytes (%) (Auto) 7    0-12  %


 


Eosinophils (%) (Auto) 0    0-10  %


 


Basophils (%) (Auto) 0    0-10  %


 


Neutrophils # (Auto)


 5.3 


 


 


 1.8-7.8


10^3/uL


 


Lymphocytes # (Auto)


 1.3 


 


 


 1.0-4.0


10^3/uL


 


Monocytes # (Auto)


 0.5 


 


 


 0.0-1.0


10^3/uL


 


Eosinophils # (Auto)


 0.0 


 


 


 0.0-0.3


10^3/uL


 


Basophils # (Auto)


 0.0 


 


 


 0.0-0.1


10^3/uL


 


Immature Granulocyte # (Auto)


 0.0 


 


 


 0.0-0.1


10^3/uL


 


Erythrocyte Sedimentation Rate 20    0-30  MM/HR


 


Prothrombin Time 15.2 H   12.2-14.7  SEC


 


INR Comment 1.2    0.8-1.4  


 


Activated Partial


Thromboplast Time 28 


 


 


 24-35  SEC





 


D-Dimer


 0.99 H


 


 


 0.00-0.49


UG/ML


 


Sodium Level 136    135-145  MMOL/L


 


Potassium Level 2.6 L   3.6-5.0  MMOL/L


 


Chloride Level 99      MMOL/L


 


Carbon Dioxide Level 21    21-32  MMOL/L


 


Anion Gap 16 H   5-14  MMOL/L


 


Blood Urea Nitrogen 20 H   7-18  MG/DL


 


Creatinine


 1.52 H


 


 


 0.60-1.30


MG/DL


 


Estimat Glomerular Filtration


Rate 49 


 


 


  





 


BUN/Creatinine Ratio 13     


 


Glucose Level 402 *H     MG/DL


 


Lactic Acid Level


 3.23 *H


 


 


 0.50-2.00


MMOL/L


 


Calcium Level 8.8    8.5-10.1  MG/DL


 


Corrected Calcium 9.0    8.5-10.1  MG/DL


 


Magnesium Level 2.2    1.6-2.4  MG/DL


 


Total Bilirubin 0.8    0.1-1.0  MG/DL


 


Aspartate Amino Transf


(AST/SGOT) 31 


 


 


 5-34  U/L





 


Alanine Aminotransferase


(ALT/SGPT) 31 


 


 


 0-55  U/L





 


Alkaline Phosphatase 68      U/L


 


Lactate Dehydrogenase 367 H   125-220  U/L


 


Total Creatine Kinase 229 H     U/L


 


Creatine Kinase MB 0.5    <6.6  NG/ML


 


Myoglobin


 99.9 H


 


 


 10.0-92.0


NG/ML


 


C-Reactive Protein High


Sensitivity 13.72 H


 


 


 0.00-0.50


MG/DL


 


B-Type Natriuretic Peptide 11.5    <100.0  PG/ML


 


Total Protein 7.9    6.4-8.2  GM/DL


 


Albumin 3.7    3.2-4.5  GM/DL


 


Amylase Level 26      U/L


 


Lipase 41    8-78  U/L


 


Procalcitonin 0.21 H   <0.10  NG/ML


 


Coronavirus 2019 (NANCY) Positive H   Negative  


 


Monoscreen NEGATIVE    NEGATIVE  


 


Group A Streptococcus Screen NEGATIVE    NEGATIVE  


 


Urine Color  YELLOW    


 


Urine Clarity  SL CLOUDY    


 


Urine pH  6.0   5-9  


 


Urine Specific Gravity  1.010 L  1.016-1.022  


 


Urine Protein  1+ H  NEGATIVE  


 


Urine Glucose (UA)  3+ H  NEGATIVE  


 


Urine Ketones  NEGATIVE   NEGATIVE  


 


Urine Nitrite  NEGATIVE   NEGATIVE  


 


Urine Bilirubin  NEGATIVE   NEGATIVE  


 


Urine Urobilinogen  0.2   < = 1.0  MG/DL


 


Urine Leukocyte Esterase  NEGATIVE   NEGATIVE  


 


Urine RBC (Auto)  TRACE-I   NEGATIVE  


 


Urine RBC  RARE    /HPF


 


Urine WBC  0-2    /HPF


 


Urine Squamous Epithelial


Cells 


 NONE 


 


  /HPF





 


Urine Crystals  NONE    /LPF


 


Urine Bacteria  TRACE    /HPF


 


Urine Casts  PRESENT    /LPF


 


Urine Hyaline Casts  RARE    /LPF


 


Urine Mucus  SMALL H   /LPF


 


Urine Culture Indicated


 


 CULTURE


PENDING 


  





 


Urine Opiates Screen  NEGATIVE   NEGATIVE  


 


Urine Oxycodone Screen  NEGATIVE   NEGATIVE  


 


Urine Methadone Screen  NEGATIVE   NEGATIVE  


 


Urine Propoxyphene Screen  NEGATIVE   NEGATIVE  


 


Urine Barbiturates Screen  NEGATIVE   NEGATIVE  


 


Ur Tricyclic Antidepressants


Screen 


 NEGATIVE 


 


 NEGATIVE  





 


Urine Phencyclidine Screen  NEGATIVE   NEGATIVE  


 


Urine Amphetamines Screen  NEGATIVE   NEGATIVE  


 


Urine Methamphetamines Screen  NEGATIVE   NEGATIVE  


 


Urine Benzodiazepines Screen  NEGATIVE   NEGATIVE  


 


Urine Cocaine Screen  NEGATIVE   NEGATIVE  


 


Urine Cannabinoids Screen  NEGATIVE   NEGATIVE  


 


Blood Gas Puncture Site   L RAD   


 


Blood Gas Patient Temperature   38.4   


 


Arterial Blood pH   7.42  7.37-7.43  


 


Arterial Blood Partial


Pressure CO2 


 


 34 L


 35-45  MMHG





 


Arterial Blood Partial


Pressure O2 


 


 56 L


 79-93  MMHG





 


Arterial Blood HCO3   21 L 23-27  MMOL/L


 


Arterial Blood Total CO2


 


 


 22.3 


 21.0-31.0


MMOL/L


 


Arterial Blood Oxygen


Saturation 


 


 85 L


   %





 


Arterial Blood Base Excess


 


 


 -2.2 


 -2.5-2.5


MMOL/L


 


[place] Test   YES-POS   


 


Blood Gas Ventilator Setting   NO   


 


Blood Gas Inspired Oxygen   5LPM   








Micro Results





Microbiology


1/10/21 Influenza Types A,B Antigen (COURTNEY) - Final, Complete


          





My Orders





Orders - DAVY BARRIENTOS DO


Ns Iv 1000 Ml (Sodium Chloride 0.9%) (1/10/21 19:26)


Ibuprofen  Tablet (Motrin  Tablet) (1/10/21 19:26)


Acetaminophen  Tablet (Tylenol  Tablet) (1/10/21 19:26)


Ed Iv/Invasive Line Start (1/10/21 19:31)


Ekg Tracing (1/10/21 19:31)


Catheter(Urinary) Insert & Ass 03,15 (1/10/21 19:31)


O2 (1/10/21 19:31)


Monitor-Rhythm Ecg Trace Only (1/10/21 19:31)


Chest 1 View, Ap/Pa Only (1/10/21 19:31)


Amylase (1/10/21 19:31)


Arterial Blood Gas (1/10/21 19:31)


BNP (1/10/21 19:31)


Cbc With Automated Diff (1/10/21 19:31)


Comprehensive Metabolic Panel (1/10/21 19:31)


Creatine Kinase (1/10/21 19:31)


Creatine Kinase Mb (1/10/21 19:31)


Hs C Reactive Protein (1/10/21 19:31)


Fibrin Degradation Products (1/10/21 19:31)


Drug Screen Stat (Urine) (1/10/21 19:31)


Lactic Acid Analyzer (1/10/21 19:31)


Lipase (1/10/21 19:31)


Magnesium (1/10/21 19:31)


Procalcitonin (Pct) (1/10/21 19:31)


Protime With Inr (1/10/21 19:31)


Partial Thromboplastin Time (1/10/21 19:31)


Ua Culture If Indicated (1/10/21 19:31)


Blood Culture (1/10/21 19:31)


Influenza A And B Antigens (1/10/21 19:31)


Erythrocyte Sedimentation Rate (1/10/21 19:31)


Myoglobin Serum (1/10/21 19:31)


Ed Iv/Invasive Line Start (1/10/21 19:31)


Ns Iv 1000 Ml (Sodium Chloride 0.9%) (1/10/21 19:45)


Acetaminophen  Tablet (Tylenol  Tablet) (1/10/21 19:31)


Ibuprofen  Tablet (Motrin  Tablet) (1/10/21 19:31)


LDH (1/10/21 19:31)


Coronavirus Sars-Cov-2 So 2019 (1/10/21 19:31)


Covid 19 Inhouse Test (1/10/21 19:31)


Urine Culture (1/10/21 19:31)


Ed Iv/Invasive Line Start (1/10/21 19:31)


Vital Signs Adult Sepsis Patie Q15M (1/10/21 19:31)


Remove Rings In Anticipation O (1/10/21 19:31)


Monotest (1/10/21 19:45)


Rapid Strep A Screen (1/10/21 19:45)


Arterial Blood Draw (1/10/21 )


Ct Angio Chest W (1/10/21 20:29)


Ns W/Kcl 40 Meq/L (Ns Iv W/Kcl 40 Meq/L) (1/10/21 20:30)


Insulin (Regular) Human (Novolin R (Per (1/10/21 20:30)


Ed Iv/Invasive Line Start (1/10/21 20:30)


Ns Iv 1000 Ml (Sodium Chloride 0.9%) (1/10/21 20:30)


Dexamethasone Injection (Decadron Inje (1/10/21 20:45)


Ceftriaxone  For Iv Use (Rocephin  For I (1/10/21 20:45)


Azithromycin Injection (Zithromax Inject (1/10/21 20:45)


Iohexol Injection (Omnipaque 350 Mg/Ml 1 (1/10/21 20:45)


Received Contrast (Hold Metformin- Contr (1/10/21 20:45)


Ns (Ivpb) (Sodium Chloride 0.9% Ivpb Bag (1/10/21 20:45)


Iohexol Injection (Omnipaque 350 Mg/Ml 1 (1/10/21 21:30)


Received Contrast (Hold Metformin- Contr (1/10/21 21:30)


Ns (Ivpb) (Sodium Chloride 0.9% Ivpb Bag (1/10/21 21:30)


Ed Iv/Invasive Line Start (1/10/21 21:32)


Ns Iv 1000 Ml (Sodium Chloride 0.9%) (1/10/21 21:45)


Enoxaparin Injection (Lovenox Injection) (1/10/21 22:00)





Medications Given in ED





Current Medications








 Medications  Dose


 Ordered  Sig/Iris


 Route  Start Time


 Stop Time Status Last Admin


Dose Admin


 


 Azithromycin 500


 mg/Sodium Chloride  250 ml @ 


 250 mls/hr  ONCE  ONCE


 IV  1/10/21 20:45


 1/10/21 21:44 DC 1/10/21 21:43


250 MLS/HR


 


 Ceftriaxone


 Sodium 1000 mg/


 Sterile Water  10 ml @ 


 200 mls/hr  ONCE  ONCE


 IV  1/10/21 20:45


 1/10/21 20:47 DC 1/10/21 21:44


200 MLS/HR


 


 Dexamethasone


 Sodium Phosphate  6 mg  ONCE  ONCE


 IV  1/10/21 20:45


 1/10/21 20:46 DC 1/10/21 21:44


6 MG


 


 Insulin Human


 Regular  20 unit  ONCE  ONCE


 IV  1/10/21 20:30


 1/10/21 20:31 DC 1/10/21 21:44


20 UNIT


 


 Iohexol  100 ml  ONCE  ONCE


 IV  1/10/21 20:45


 1/10/21 20:46 DC 1/10/21 21:26


100 ML


 


 Sodium Chloride  100 ml  ONCE  ONCE


 IV  1/10/21 20:45


 1/10/21 20:46 DC 1/10/21 21:26


80 ML








Vital Signs/I&O


Capillary Refill :


Progress Note :  


Progress Note


PLACED IN ISOLATION ROOM


PPE WORN AT ALL TIMES


COVID-19 TESTING PERFORMED


PT AND WIFE INFORMED OF NEED FOR QUARANTINE





O2 SAT 85-86% ON ROOM AIR--UP TO MID TO UPPER  90'S ON O2 AT 4L/NC


GIVEN IV FLUIDS


GIVEN TYLENOL AND MOTRIN WITH DECREASE IN TEMPERATURE AT TIME OF ADMIT


GIVEN DECADRON


GIVEN ROCEPHIN AND ZITHROMAX


GIVEN LOVENOX FOR DVT PROPHYLAXIS


GIVEN INSULIN





NO DETERIORATION IN PT'S CONDITION DURING ER STAY





ECG


Initial ECG Impression Date:  Wilber 10, 2021


Initial ECG Impression Time:  19:59


Initial ECG Rate:  120


Initial ECG Rhythm:  S.Tach


Initial ECG Impression:  Nonspecific Changes


Initial ECG Comparisson:  No Previous ECG Available





Diagnostic Imaging





Comments


CXR--PER RADIOLOGIST REPORT AT 2109


IMPRESSION: Patchy groundglass infiltrates in the mid to lower


lung field on both sides is suspicious for atypical pneumonia.


Follow-up is recommended. 





CT CHEST ANGIOGRAM--PER RADIOLOGIST REPORT AT 2145


IMPRESSION: No CT evidence of pulmonary emboli, aortic dissection


or aneurysm. There are patchy groundglass infiltrates throughout


both lungs, suspicious for atypical pneumonia such as Covid


pneumonia. The findings are moderate to severe.


   Reviewed:  Reviewed by Me





Departure


Communication (Admissions)


2104--SPOKE WITH DR. MARINO, HOSPITALIST, ACCEPTS PT FOR ADMIT. ADVISES TO ORDER

REMDESIVIR AND CONVALESCENT PLASMA, AS WELL AS ANTIBIOTICS





Impression





   Primary Impression:  


   Pneumonia due to COVID-19 virus


   Additional Impressions:  


   Acute respiratory failure due to COVID-19


   Sepsis


   Uncontrolled diabetes mellitus


   Dehydration


   Renal insufficiency


   Hypokalemia


   Non-compliance


Disposition:  09 ADMITTED AS INPATIENT


Condition:  Stable





Admissions


Decision to Admit Reason:  Admit from ER (General)


Decision to Admit/Date:  Wilber 10, 2021


Time/Decision to Admit Time:  21:00





Departure-Patient Inst.


Referrals:  


[facility] (PCP/Family)


Primary Care Physician











DAVY BARRIENTOS DO                 Wilber 10, 2021 19:40

## 2021-01-10 NOTE — DIAGNOSTIC IMAGING REPORT
INDICATION: Fever, lethargy and shortness of breath.



TECHNIQUE: Multiple contiguous axial images were obtained through

the chest after uneventful bolus administration of intravenous

contrast. 3D reconstructed CTA MIP acquisitions were also

performed. Auto Exposure Controls were utilized during the CT

exam to meet ALARA standards for radiation dose reduction. 



COMPARISON: There is no prior chest CTA for comparison.



FINDINGS: The thoracic aorta shows no evidence of aneurysm or

dissection. Great vessel origins are patent. The pulmonary

parenchymal vessels are well-opacified with no CT evidence of

pulmonary emboli. There is no adenopathy in the mediastinum or

ang. There is no pleural or pericardial fluid. Visualized

portions of the upper abdomen demonstrate diffuse fatty

infiltration of the liver. Patient has had previous

cholecystectomy.



Lung parenchymal windows demonstrate patchy groundglass

infiltrates throughout both lungs, compatible with atypical

pneumonia of moderate to severe nature.



IMPRESSION: No CT evidence of pulmonary emboli, aortic dissection

or aneurysm. There are patchy groundglass infiltrates throughout

both lungs, suspicious for atypical pneumonia such as Covid

pneumonia. The findings are moderate to severe. 



Dictated by: 



  Dictated on workstation # [id number]

## 2021-01-10 NOTE — NUR
MURRAY PICKETT admitted to room 405-1, with an admitting diagnosis of COVID, ARF, SEPSIS, 
DEHYDRATION, UNCONTROLLED DIABETES, on 01/10/21 from ED via CART, accompanied by STAFF. 
MURRAY PICKETT introduced to surroundings, call light, bed controls, phone, TV, temperature 
control, lights, meal times, smoking policy, visitor policy, side rail policy, bathrooms and 
showers.  Patient Rights given to patient in the handbook. MURRAY PICKETT verbalizes 
understanding that [facility] is not responsible for the loss or damage to any personal 
effects or valuables that are kept in the patients posession during their hospitalization.  
The following Patient Care Plans were discussed with the : Discharge Planning, , and. 
MURRAY PICKETT verbalizes understanding of Interdisciplinary Patient Education. Patient 
and/or family were informed about the Rapid Response Team and its purpose.

## 2021-01-11 VITALS — SYSTOLIC BLOOD PRESSURE: 153 MMHG | DIASTOLIC BLOOD PRESSURE: 91 MMHG

## 2021-01-11 VITALS — DIASTOLIC BLOOD PRESSURE: 92 MMHG | SYSTOLIC BLOOD PRESSURE: 150 MMHG

## 2021-01-11 VITALS — SYSTOLIC BLOOD PRESSURE: 125 MMHG | DIASTOLIC BLOOD PRESSURE: 59 MMHG

## 2021-01-11 VITALS — SYSTOLIC BLOOD PRESSURE: 157 MMHG | DIASTOLIC BLOOD PRESSURE: 91 MMHG

## 2021-01-11 VITALS — DIASTOLIC BLOOD PRESSURE: 96 MMHG | SYSTOLIC BLOOD PRESSURE: 176 MMHG

## 2021-01-11 VITALS — DIASTOLIC BLOOD PRESSURE: 92 MMHG | SYSTOLIC BLOOD PRESSURE: 170 MMHG

## 2021-01-11 VITALS — SYSTOLIC BLOOD PRESSURE: 137 MMHG | DIASTOLIC BLOOD PRESSURE: 79 MMHG

## 2021-01-11 VITALS — DIASTOLIC BLOOD PRESSURE: 59 MMHG | SYSTOLIC BLOOD PRESSURE: 125 MMHG

## 2021-01-11 VITALS — DIASTOLIC BLOOD PRESSURE: 90 MMHG | SYSTOLIC BLOOD PRESSURE: 144 MMHG

## 2021-01-11 VITALS — DIASTOLIC BLOOD PRESSURE: 95 MMHG | SYSTOLIC BLOOD PRESSURE: 157 MMHG

## 2021-01-11 VITALS — DIASTOLIC BLOOD PRESSURE: 76 MMHG | SYSTOLIC BLOOD PRESSURE: 152 MMHG

## 2021-01-11 LAB
ALBUMIN SERPL-MCNC: 3.2 GM/DL (ref 3.2–4.5)
ALBUMIN SERPL-MCNC: 3.2 GM/DL (ref 3.2–4.5)
ALP SERPL-CCNC: 57 U/L (ref 40–136)
ALP SERPL-CCNC: 59 U/L (ref 40–136)
ALT SERPL-CCNC: 27 U/L (ref 0–55)
ALT SERPL-CCNC: 27 U/L (ref 0–55)
AMORPH SED URNS QL MICRO: (no result) /LPF
APTT PPP: YELLOW S
BACTERIA #/AREA URNS HPF: (no result) /HPF
BASOPHILS # BLD AUTO: 0 10^3/UL (ref 0–0.1)
BASOPHILS NFR BLD AUTO: 0 % (ref 0–10)
BILIRUB SERPL-MCNC: 0.6 MG/DL (ref 0.1–1)
BILIRUB SERPL-MCNC: 0.7 MG/DL (ref 0.1–1)
BILIRUB UR QL STRIP: NEGATIVE
BUN/CREAT SERPL: 16
BUN/CREAT SERPL: 16
BUN/CREAT SERPL: 17
BUN/CREAT SERPL: 18
CALCIUM SERPL-MCNC: 7.8 MG/DL (ref 8.5–10.1)
CALCIUM SERPL-MCNC: 7.9 MG/DL (ref 8.5–10.1)
CALCIUM SERPL-MCNC: 8.1 MG/DL (ref 8.5–10.1)
CALCIUM SERPL-MCNC: 8.1 MG/DL (ref 8.5–10.1)
CHLORIDE SERPL-SCNC: 105 MMOL/L (ref 98–107)
CHLORIDE SERPL-SCNC: 106 MMOL/L (ref 98–107)
CHLORIDE SERPL-SCNC: 106 MMOL/L (ref 98–107)
CHLORIDE SERPL-SCNC: 107 MMOL/L (ref 98–107)
CO2 SERPL-SCNC: 14 MMOL/L (ref 21–32)
CO2 SERPL-SCNC: 16 MMOL/L (ref 21–32)
CO2 SERPL-SCNC: 18 MMOL/L (ref 21–32)
CO2 SERPL-SCNC: 18 MMOL/L (ref 21–32)
CREAT SERPL-MCNC: 0.96 MG/DL (ref 0.6–1.3)
CREAT SERPL-MCNC: 1.03 MG/DL (ref 0.6–1.3)
CREAT SERPL-MCNC: 1.17 MG/DL (ref 0.6–1.3)
CREAT SERPL-MCNC: 1.25 MG/DL (ref 0.6–1.3)
EOSINOPHIL # BLD AUTO: 0 10^3/UL (ref 0–0.3)
EOSINOPHIL NFR BLD AUTO: 0 % (ref 0–10)
FIBRINOGEN PPP-MCNC: CLEAR MG/DL
GFR SERPLBLD BASED ON 1.73 SQ M-ARVRAT: > 60 ML/MIN
GLUCOSE SERPL-MCNC: 357 MG/DL (ref 70–105)
GLUCOSE SERPL-MCNC: 446 MG/DL (ref 70–105)
GLUCOSE SERPL-MCNC: 448 MG/DL (ref 70–105)
GLUCOSE SERPL-MCNC: 542 MG/DL (ref 70–105)
GLUCOSE UR STRIP-MCNC: (no result) MG/DL
HCT VFR BLD CALC: 42 % (ref 40–54)
HCT VFR BLD CALC: 44 % (ref 40–54)
HGB BLD-MCNC: 14.3 G/DL (ref 13.3–17.7)
HGB BLD-MCNC: 14.7 G/DL (ref 13.3–17.7)
KETONES UR QL STRIP: (no result)
LEUKOCYTE ESTERASE UR QL STRIP: NEGATIVE
LYMPHOCYTES # BLD AUTO: 0.8 10^3/UL (ref 1–4)
LYMPHOCYTES NFR BLD AUTO: 11 % (ref 12–44)
MAGNESIUM SERPL-MCNC: 2.1 MG/DL (ref 1.6–2.4)
MAGNESIUM SERPL-MCNC: 2.1 MG/DL (ref 1.6–2.4)
MAGNESIUM SERPL-MCNC: 2.2 MG/DL (ref 1.6–2.4)
MANUAL DIFFERENTIAL PERFORMED BLD QL: NO
MCH RBC QN AUTO: 28 PG (ref 25–34)
MCH RBC QN AUTO: 28 PG (ref 25–34)
MCHC RBC AUTO-ENTMCNC: 34 G/DL (ref 32–36)
MCHC RBC AUTO-ENTMCNC: 34 G/DL (ref 32–36)
MCV RBC AUTO: 81 FL (ref 80–99)
MCV RBC AUTO: 83 FL (ref 80–99)
MONOCYTES # BLD AUTO: 0.3 10^3/UL (ref 0–1)
MONOCYTES NFR BLD AUTO: 4 % (ref 0–12)
NEUTROPHILS # BLD AUTO: 5.8 10^3/UL (ref 1.8–7.8)
NEUTROPHILS NFR BLD AUTO: 84 % (ref 42–75)
NITRITE UR QL STRIP: NEGATIVE
PH UR STRIP: 6 [PH] (ref 5–9)
PHOSPHATE SERPL-MCNC: 1.3 MG/DL (ref 2.3–4.7)
PHOSPHATE SERPL-MCNC: 1.5 MG/DL (ref 2.3–4.7)
PHOSPHATE SERPL-MCNC: 1.7 MG/DL (ref 2.3–4.7)
PLATELET # BLD: 246 10^3/UL (ref 130–400)
PLATELET # BLD: 281 10^3/UL (ref 130–400)
PMV BLD AUTO: 11.2 FL (ref 9–12.2)
PMV BLD AUTO: 11.3 FL (ref 9–12.2)
POTASSIUM SERPL-SCNC: 2.8 MMOL/L (ref 3.6–5)
POTASSIUM SERPL-SCNC: 3.2 MMOL/L (ref 3.6–5)
POTASSIUM SERPL-SCNC: 3.3 MMOL/L (ref 3.6–5)
POTASSIUM SERPL-SCNC: 3.6 MMOL/L (ref 3.6–5)
PROT SERPL-MCNC: 6.5 GM/DL (ref 6.4–8.2)
PROT SERPL-MCNC: 6.7 GM/DL (ref 6.4–8.2)
PROT UR QL STRIP: (no result)
RBC #/AREA URNS HPF: >100 /HPF
SODIUM SERPL-SCNC: 135 MMOL/L (ref 135–145)
SODIUM SERPL-SCNC: 135 MMOL/L (ref 135–145)
SODIUM SERPL-SCNC: 136 MMOL/L (ref 135–145)
SODIUM SERPL-SCNC: 137 MMOL/L (ref 135–145)
SP GR UR STRIP: 1.01 (ref 1.02–1.02)
WBC # BLD AUTO: 10.3 10^3/UL (ref 4.3–11)
WBC # BLD AUTO: 6.9 10^3/UL (ref 4.3–11)
WBC #/AREA URNS HPF: (no result) /HPF

## 2021-01-11 PROCEDURE — XW033E5 INTRODUCTION OF REMDESIVIR ANTI-INFECTIVE INTO PERIPHERAL VEIN, PERCUTANEOUS APPROACH, NEW TECHNOLOGY GROUP 5: ICD-10-PCS | Performed by: INTERNAL MEDICINE

## 2021-01-11 RX ADMIN — INSULIN ASPART SCH UNIT: 100 INJECTION, SOLUTION INTRAVENOUS; SUBCUTANEOUS at 12:12

## 2021-01-11 RX ADMIN — ENOXAPARIN SODIUM SCH MG: 100 INJECTION SUBCUTANEOUS at 21:32

## 2021-01-11 RX ADMIN — POTASSIUM CHLORIDE SCH MLS/HR: 200 INJECTION, SOLUTION INTRAVENOUS at 21:30

## 2021-01-11 RX ADMIN — POTASSIUM CHLORIDE SCH MLS/HR: 200 INJECTION, SOLUTION INTRAVENOUS at 20:30

## 2021-01-11 RX ADMIN — SODIUM CHLORIDE SCH MLS/HR: 900 INJECTION INTRAVENOUS at 21:35

## 2021-01-11 RX ADMIN — SODIUM CHLORIDE, SODIUM LACTATE, POTASSIUM CHLORIDE, AND CALCIUM CHLORIDE SCH MLS/HR: 600; 310; 30; 20 INJECTION, SOLUTION INTRAVENOUS at 19:09

## 2021-01-11 RX ADMIN — SODIUM CHLORIDE, SODIUM LACTATE, POTASSIUM CHLORIDE, CALCIUM CHLORIDE, AND DEXTROSE MONOHYDRATE SCH MLS/HR: 600; 310; 30; 20; 5 INJECTION, SOLUTION INTRAVENOUS at 23:05

## 2021-01-11 RX ADMIN — SODIUM CHLORIDE, SODIUM LACTATE, POTASSIUM CHLORIDE, CALCIUM CHLORIDE, AND DEXTROSE MONOHYDRATE SCH MLS/HR: 600; 310; 30; 20; 5 INJECTION, SOLUTION INTRAVENOUS at 19:34

## 2021-01-11 RX ADMIN — POTASSIUM CHLORIDE SCH MLS/HR: 200 INJECTION, SOLUTION INTRAVENOUS at 21:34

## 2021-01-11 RX ADMIN — POTASSIUM CHLORIDE SCH MLS/HR: 200 INJECTION, SOLUTION INTRAVENOUS at 19:30

## 2021-01-11 RX ADMIN — POTASSIUM CHLORIDE SCH MLS/HR: 200 INJECTION, SOLUTION INTRAVENOUS at 20:34

## 2021-01-11 RX ADMIN — INSULIN ASPART SCH UNIT: 100 INJECTION, SOLUTION INTRAVENOUS; SUBCUTANEOUS at 16:59

## 2021-01-11 RX ADMIN — INSULIN ASPART SCH UNIT: 100 INJECTION, SOLUTION INTRAVENOUS; SUBCUTANEOUS at 05:31

## 2021-01-11 RX ADMIN — ALBUTEROL SULFATE SCH PUFF: 90 AEROSOL, METERED RESPIRATORY (INHALATION) at 14:30

## 2021-01-11 RX ADMIN — ENOXAPARIN SODIUM SCH MG: 100 INJECTION SUBCUTANEOUS at 13:21

## 2021-01-11 RX ADMIN — SODIUM CHLORIDE AND POTASSIUM CHLORIDE SCH MLS/HR: 9; 1.49 INJECTION, SOLUTION INTRAVENOUS at 06:12

## 2021-01-11 RX ADMIN — SODIUM CHLORIDE, SODIUM LACTATE, POTASSIUM CHLORIDE, AND CALCIUM CHLORIDE SCH MLS/HR: 600; 310; 30; 20 INJECTION, SOLUTION INTRAVENOUS at 20:34

## 2021-01-11 RX ADMIN — SODIUM CHLORIDE AND POTASSIUM CHLORIDE SCH MLS/HR: 9; 1.49 INJECTION, SOLUTION INTRAVENOUS at 00:20

## 2021-01-11 RX ADMIN — SODIUM CHLORIDE AND POTASSIUM CHLORIDE SCH MLS/HR: 9; 1.49 INJECTION, SOLUTION INTRAVENOUS at 12:12

## 2021-01-11 RX ADMIN — FAMOTIDINE SCH MG: 10 INJECTION INTRAVENOUS at 09:57

## 2021-01-11 RX ADMIN — ALBUTEROL SULFATE SCH PUFF: 90 AEROSOL, METERED RESPIRATORY (INHALATION) at 10:29

## 2021-01-11 RX ADMIN — SODIUM CHLORIDE SCH MLS/HR: 900 INJECTION, SOLUTION INTRAVENOUS at 21:31

## 2021-01-11 RX ADMIN — POTASSIUM CHLORIDE SCH MLS/HR: 200 INJECTION, SOLUTION INTRAVENOUS at 22:30

## 2021-01-11 RX ADMIN — ALBUTEROL SULFATE SCH GM: 90 AEROSOL, METERED RESPIRATORY (INHALATION) at 07:16

## 2021-01-11 NOTE — NUR
Patient had a critical glucose of 448. Dr Thorpe notified. 12UNITS SSI administered per 
order. Patient asked if he could use the toilet for  BM. This nurse suggested patient use 
BSC instead due to weakness. Patient did have some incontinence of stool in the bed. Patient 
was assisted to BSC, very weak. Patient had a large, brown liquid stool. Patient reports 
liquid stools are normal for him.

## 2021-01-11 NOTE — NUR
SPOKE WITH THE PT (CALLED THE ROOM PHONE), CALLED HIS WIFE (YANIRA) AND WENT THRU THE EXT 
MED HISTORY TO COMPLETE THE MED REC



PT WAS NOT ABLE TO GIVE ME ANY INFORMATION ABOUT HIS MEDICATIONS- WHEN I SPOKE WITH YANIRA 
SHE LET ME KNOW THAT THE ONLY THING MURRAY WAS TAKING IS AMOXICILLIN 500MG AND MECLIZINE 25MG 
FILLED ON 01-. 



OTC MEDS: NONE

## 2021-01-11 NOTE — NUR
ER dose of Lovenox administered. Nuñez catheter inserted using 18F Coude catheter. Patient 
tolerated well.

## 2021-01-11 NOTE — NUR
RD ASSESSMENT 



PMHx: HTN; Crohn's disease; DM (hx of non-compliance); 



PT INTERACTION: Note pt currently in COVID isolation, per chart review. Note all diet 

information for nutrition assessment is per Jessica RN, or per chart 

review. Jessica states current appetite appears good. Note avg PO intake 50% x2meal, per 
chart review. Jessica states some issues with diarrhea. Note pt has hx of Crohn's disease, 
per chart review. Note last BM was 1/11, and pt not currently on bowel regimen per chart 
review. Note recent 7# wt loss x11mon, per chart review. Note unable to determine current 
level of DM management, and not unable to determine recent HbA1c, per chart review. Note pt 
has hx of DM non-compliance, per chart review. 



Note abnormal lab values of BUN 23 H; glu 448 H; and Ca 7.4 L, per chart review. 



Est. kcal needs: 6158-0559 kcal | 15-18 kcal/kg  

Est. Pro needs:   g Pro | 0.8-1.0 g Pro/kg 



PES STATEMENT: Inadequate oral intake (NI-2.1) related to loss of appetite, as evidenced by 
chart review, communication with RN, and avg PO intake 50% x2meal. 



INTERVENTION:  

Continue with current diet order of Clear Liquid diet. Pt may benefit from diet advancement, 
if medically able and as tolerated. 

Pt may benefit from nutrition supplementation if PO intake declines. 

Did not offer diet education on DM management d/t isolation precautions. 

Will continue to follow and reassess as pt needs, intake, and status change. 





ALIZE PATTERSON, MS RD LD 

[phone] cell

## 2021-01-11 NOTE — NUR
Patient states that he does not believe he has COVID-19, "that is just something the doctors 
tell everyone".

## 2021-01-11 NOTE — NUR
Dr Caceres and Dr Esquivel notified of patient's critical lactic acid level, Dr Caceres placed 
orders for patient to be transferred to ICU due to concern for DKA.  Patient informed and 
report given to ICU nurse Marcy.  Patient transferred at 1740.

## 2021-01-11 NOTE — HISTORY & PHYSICAL-HOSPITALIST
History of Present Illness


HPI/Chief Complaint


CC: SOB 





HPI: This is a 50yoWM clinic Pt of [facility] who has no medical history since he never

goes to the doctor who presented with Covid-positive swab results with SOB of 

hypoxia. He was found to have new onset DM and appears to have high risk for 

severe SHYAM. He was placed on protocol for Covid-19 treatment along with oxygen 

supplementation and overall he will be monitored closely for decompensation 

because he appears to be very high risk for intubation and respiratory failure. 

Lovenox was changed to 60 mg BID due to increased BMI. He is a lifetime 

nonsmoker and does not drink alcohol. He lives at home with his partner and he 

is retired from the [employer] system in [place].


Source:  patient, RN/MD


Exam Limitations:  clinical condition


Date Seen


21


Time Seen by a Provider:  10:00


Attending Physician


Yaneth Thorpe MD


PCP


[place]


Referring Physician





Date of Admission


Wilber 10, 2021 at 21:00





Home Medications & Allergies


Home Medications


Reviewed patient Home Medication Reconciliation performed by pharmacy medication

reconciliations technician and/or nursing.


Patients Allergies have been reviewed.





Allergies





Allergies


Coded Allergies


  No Known Drug Allergies ([id number])








Past Medical-Social-Family Hx


Past Med/Social Hx:  Reviewed Nursing Past Med/Soc Hx, Reviewed and Corrections 

made


Patient Social History


Marrital Status:  cohabiting


Employed/Student:  retired


Alcohol Use:  Denies Use


Recreational Drug Use:  No


Smoking Status:  Never a Smoker


2nd Hand Smoke Exposure:  No


Recent Foreign Travel:  No


Contact w/other who traveled:  No


Recent Hopitalizations:  No


Recent Infectious Disease Expo:  Yes (pt covid positive)





Seasonal Allergies


Seasonal Allergies:  No





Past Medical History


Surgeries:  Abdominal, Appendectomy, Bowel Surgery, Gallbladder


Cardiac:  Hypertension


Reproductive:  No


Sexually Transmitted Disease:  No


HIV/AIDS:  No


Gastrointestinal:  Crohns Disease, Gall Bladder Disease


Endocrine:  Diabetes, Non-Insulin dep


History of Blood Disorders:  No


Adverse Reaction to Blood Cao:  No





Review of Systems


Constitutional:  see HPI, malaise, weakness


Respiratory:  cough, dyspnea on exertion


Psychiatric/Neurological:  Depressed





Physical Exam


Physical Exam


Vital Signs





Vital Signs - First Documented








 1/10/21 1/11/21





 19:25 01:25


 


Temp 39.1 


 


Pulse 125 


 


Resp 33 


 


B/P (MAP) 132/90 (104) 


 


Pulse Ox 96 


 


O2 Delivery Nasal Cannula 


 


O2 Flow Rate 5.00 


 


FiO2  28





Capillary Refill : Less Than 3 Seconds


Height, Weight, BMI


Height: '"


Weight: lbs. oz. kg; 35.28 BMI


Method:


General Appearance:  Anxious, Chronically ill, Mild Distress, Obese


Eyes:  Right Eye Normal Inspection, Right Eye PERRL


HEENT:  PERRL/EOMI, Normal ENT Inspection, Pharynx Normal, Moist Mucous Me

mbranes


Neck:  Full Range of Motion, Normal Inspection, Non Tender


Respiratory:  Chest Non Tender, Lungs Clear, Normal Breath Sounds, Accessory 

Muscle Use, Decreased Breath Sounds


Cardiovascular:  Regular Rate, Rhythm, No Edema, No Gallop, No JVD, No Murmur, 

Normal Peripheral Pulses


Gastrointestinal:  Normal Bowel Sounds, No Organomegaly, No Pulsatile Mass, Non 

Tender, Soft


Back:  Normal Inspection, No CVA Tenderness, No Vertebral Tenderness


Extremity:  Normal Capillary Refill, Normal Inspection, Normal Range of Motion, 

Non Tender, No Calf Tenderness, No Pedal Edema


Neurologic/Psychiatric:  Alert, Oriented x3, No Motor/Sensory Deficits, Normal M

ood/Affect


Skin:  Normal Color, Warm/Dry


Lymphatic:  No Adenopathy





Results


Results/Procedures


Labs


Laboratory Tests


1/10/21 19:43








21 04:26








21 16:15








21 18:35








21 21:50








21 00:03








21 02:52








Patient resulted labs reviewed.





Assessment/Plan


Admission Diagnosis


Assessment:


COVID-19 PNA


Hypoxia


Lactic acidosis


DM OOC


HTN


Obesity


Presumed SHYAM





Plan:


O2


ICU transfer


Control sugars


High risk for intubation


Admission Status:  Inpatient Order (span 2 midnights)


Reason for Inpatient Admission:  


COVID-19 hypoxia





Diagnosis/Problems


Diagnosis/Problems





(1) Acute respiratory failure due to COVID-19


Status:  Acute


(2) Pneumonia due to COVID-19 virus


Status:  Acute


(3) Uncontrolled diabetes mellitus


Status:  Acute


(4) Sepsis


(5) Dehydration


Status:  Acute


(6) Non-compliance


Status:  Acute


(7) Renal insufficiency


Status:  Acute





Clinical Quality Measures


DVT/VTE Risk/Contraindication:


Risk Factor Score Per Nursin


RFS Level Per Nursing on Admit:  4+=Very High











MARCO A HERNADEZ DO                2021 05:56

## 2021-01-12 VITALS — SYSTOLIC BLOOD PRESSURE: 154 MMHG | DIASTOLIC BLOOD PRESSURE: 91 MMHG

## 2021-01-12 VITALS — SYSTOLIC BLOOD PRESSURE: 157 MMHG | DIASTOLIC BLOOD PRESSURE: 98 MMHG

## 2021-01-12 VITALS — DIASTOLIC BLOOD PRESSURE: 88 MMHG | SYSTOLIC BLOOD PRESSURE: 150 MMHG

## 2021-01-12 VITALS — DIASTOLIC BLOOD PRESSURE: 90 MMHG | SYSTOLIC BLOOD PRESSURE: 148 MMHG

## 2021-01-12 VITALS — DIASTOLIC BLOOD PRESSURE: 99 MMHG | SYSTOLIC BLOOD PRESSURE: 157 MMHG

## 2021-01-12 VITALS — DIASTOLIC BLOOD PRESSURE: 90 MMHG | SYSTOLIC BLOOD PRESSURE: 153 MMHG

## 2021-01-12 VITALS — DIASTOLIC BLOOD PRESSURE: 93 MMHG | SYSTOLIC BLOOD PRESSURE: 148 MMHG

## 2021-01-12 VITALS — DIASTOLIC BLOOD PRESSURE: 101 MMHG | SYSTOLIC BLOOD PRESSURE: 158 MMHG

## 2021-01-12 VITALS — DIASTOLIC BLOOD PRESSURE: 97 MMHG | SYSTOLIC BLOOD PRESSURE: 151 MMHG

## 2021-01-12 VITALS — SYSTOLIC BLOOD PRESSURE: 162 MMHG | DIASTOLIC BLOOD PRESSURE: 89 MMHG

## 2021-01-12 VITALS — DIASTOLIC BLOOD PRESSURE: 91 MMHG | SYSTOLIC BLOOD PRESSURE: 150 MMHG

## 2021-01-12 VITALS — SYSTOLIC BLOOD PRESSURE: 157 MMHG | DIASTOLIC BLOOD PRESSURE: 88 MMHG

## 2021-01-12 VITALS — SYSTOLIC BLOOD PRESSURE: 163 MMHG | DIASTOLIC BLOOD PRESSURE: 95 MMHG

## 2021-01-12 VITALS — DIASTOLIC BLOOD PRESSURE: 75 MMHG | SYSTOLIC BLOOD PRESSURE: 134 MMHG

## 2021-01-12 VITALS — SYSTOLIC BLOOD PRESSURE: 158 MMHG | DIASTOLIC BLOOD PRESSURE: 98 MMHG

## 2021-01-12 VITALS — SYSTOLIC BLOOD PRESSURE: 129 MMHG | DIASTOLIC BLOOD PRESSURE: 73 MMHG

## 2021-01-12 VITALS — DIASTOLIC BLOOD PRESSURE: 90 MMHG | SYSTOLIC BLOOD PRESSURE: 159 MMHG

## 2021-01-12 VITALS — SYSTOLIC BLOOD PRESSURE: 152 MMHG | DIASTOLIC BLOOD PRESSURE: 88 MMHG

## 2021-01-12 VITALS — DIASTOLIC BLOOD PRESSURE: 98 MMHG | SYSTOLIC BLOOD PRESSURE: 177 MMHG

## 2021-01-12 VITALS — DIASTOLIC BLOOD PRESSURE: 100 MMHG | SYSTOLIC BLOOD PRESSURE: 160 MMHG

## 2021-01-12 VITALS — SYSTOLIC BLOOD PRESSURE: 156 MMHG | DIASTOLIC BLOOD PRESSURE: 91 MMHG

## 2021-01-12 LAB
BASOPHILS # BLD AUTO: 0 10^3/UL (ref 0–0.1)
BASOPHILS NFR BLD AUTO: 0 % (ref 0–10)
BUN/CREAT SERPL: 14
BUN/CREAT SERPL: 16
CALCIUM SERPL-MCNC: 7.8 MG/DL (ref 8.5–10.1)
CALCIUM SERPL-MCNC: 8 MG/DL (ref 8.5–10.1)
CHLORIDE SERPL-SCNC: 105 MMOL/L (ref 98–107)
CHLORIDE SERPL-SCNC: 106 MMOL/L (ref 98–107)
CO2 SERPL-SCNC: 19 MMOL/L (ref 21–32)
CO2 SERPL-SCNC: 21 MMOL/L (ref 21–32)
CREAT SERPL-MCNC: 0.86 MG/DL (ref 0.6–1.3)
CREAT SERPL-MCNC: 0.97 MG/DL (ref 0.6–1.3)
EOSINOPHIL # BLD AUTO: 0 10^3/UL (ref 0–0.3)
EOSINOPHIL NFR BLD AUTO: 0 % (ref 0–10)
GFR SERPLBLD BASED ON 1.73 SQ M-ARVRAT: > 60 ML/MIN
GFR SERPLBLD BASED ON 1.73 SQ M-ARVRAT: > 60 ML/MIN
GLUCOSE SERPL-MCNC: 215 MG/DL (ref 70–105)
GLUCOSE SERPL-MCNC: 257 MG/DL (ref 70–105)
HCT VFR BLD CALC: 38 % (ref 40–54)
HGB BLD-MCNC: 12.9 G/DL (ref 13.3–17.7)
LYMPHOCYTES # BLD AUTO: 0.9 10^3/UL (ref 1–4)
LYMPHOCYTES NFR BLD AUTO: 11 % (ref 12–44)
MAGNESIUM SERPL-MCNC: 2.1 MG/DL (ref 1.6–2.4)
MAGNESIUM SERPL-MCNC: 2.2 MG/DL (ref 1.6–2.4)
MANUAL DIFFERENTIAL PERFORMED BLD QL: NO
MCH RBC QN AUTO: 28 PG (ref 25–34)
MCHC RBC AUTO-ENTMCNC: 34 G/DL (ref 32–36)
MCV RBC AUTO: 82 FL (ref 80–99)
MONOCYTES # BLD AUTO: 0.5 10^3/UL (ref 0–1)
MONOCYTES NFR BLD AUTO: 6 % (ref 0–12)
NEUTROPHILS # BLD AUTO: 7.3 10^3/UL (ref 1.8–7.8)
NEUTROPHILS NFR BLD AUTO: 83 % (ref 42–75)
PHOSPHATE SERPL-MCNC: 2.8 MG/DL (ref 2.3–4.7)
PHOSPHATE SERPL-MCNC: 3.4 MG/DL (ref 2.3–4.7)
PLATELET # BLD: 289 10^3/UL (ref 130–400)
PMV BLD AUTO: 11.3 FL (ref 9–12.2)
POTASSIUM SERPL-SCNC: 3.1 MMOL/L (ref 3.6–5)
POTASSIUM SERPL-SCNC: 3.4 MMOL/L (ref 3.6–5)
SODIUM SERPL-SCNC: 138 MMOL/L (ref 135–145)
SODIUM SERPL-SCNC: 138 MMOL/L (ref 135–145)
WBC # BLD AUTO: 8.7 10^3/UL (ref 4.3–11)

## 2021-01-12 RX ADMIN — ALBUTEROL SULFATE SCH PUFF: 90 AEROSOL, METERED RESPIRATORY (INHALATION) at 10:17

## 2021-01-12 RX ADMIN — FAMOTIDINE SCH MG: 10 INJECTION INTRAVENOUS at 09:02

## 2021-01-12 RX ADMIN — SODIUM CHLORIDE, SODIUM LACTATE, POTASSIUM CHLORIDE, AND CALCIUM CHLORIDE SCH MLS/HR: 600; 310; 30; 20 INJECTION, SOLUTION INTRAVENOUS at 02:03

## 2021-01-12 RX ADMIN — ALBUTEROL SULFATE SCH PUFF: 90 AEROSOL, METERED RESPIRATORY (INHALATION) at 19:05

## 2021-01-12 RX ADMIN — INSULIN ASPART SCH UNIT: 100 INJECTION, SOLUTION INTRAVENOUS; SUBCUTANEOUS at 00:52

## 2021-01-12 RX ADMIN — SODIUM CHLORIDE, SODIUM LACTATE, POTASSIUM CHLORIDE, CALCIUM CHLORIDE, AND DEXTROSE MONOHYDRATE SCH MLS/HR: 600; 310; 30; 20; 5 INJECTION, SOLUTION INTRAVENOUS at 06:36

## 2021-01-12 RX ADMIN — SODIUM CHLORIDE, SODIUM LACTATE, POTASSIUM CHLORIDE, AND CALCIUM CHLORIDE SCH MLS/HR: 600; 310; 30; 20 INJECTION, SOLUTION INTRAVENOUS at 09:00

## 2021-01-12 RX ADMIN — INSULIN ASPART SCH UNIT: 100 INJECTION, SOLUTION INTRAVENOUS; SUBCUTANEOUS at 12:00

## 2021-01-12 RX ADMIN — INSULIN ASPART SCH UNIT: 100 INJECTION, SOLUTION INTRAVENOUS; SUBCUTANEOUS at 20:22

## 2021-01-12 RX ADMIN — SODIUM CHLORIDE, SODIUM LACTATE, POTASSIUM CHLORIDE, CALCIUM CHLORIDE, AND DEXTROSE MONOHYDRATE SCH MLS/HR: 600; 310; 30; 20; 5 INJECTION, SOLUTION INTRAVENOUS at 02:15

## 2021-01-12 RX ADMIN — POTASSIUM CHLORIDE SCH MLS/HR: 200 INJECTION, SOLUTION INTRAVENOUS at 14:49

## 2021-01-12 RX ADMIN — POTASSIUM CHLORIDE SCH MLS/HR: 200 INJECTION, SOLUTION INTRAVENOUS at 09:03

## 2021-01-12 RX ADMIN — POTASSIUM CHLORIDE SCH MLS/HR: 200 INJECTION, SOLUTION INTRAVENOUS at 06:53

## 2021-01-12 RX ADMIN — INSULIN ASPART SCH UNIT: 100 INJECTION, SOLUTION INTRAVENOUS; SUBCUTANEOUS at 16:57

## 2021-01-12 RX ADMIN — INSULIN ASPART SCH UNIT: 100 INJECTION, SOLUTION INTRAVENOUS; SUBCUTANEOUS at 09:03

## 2021-01-12 RX ADMIN — ALBUTEROL SULFATE SCH PUFF: 90 AEROSOL, METERED RESPIRATORY (INHALATION) at 14:07

## 2021-01-12 RX ADMIN — POTASSIUM CHLORIDE SCH MLS/HR: 200 INJECTION, SOLUTION INTRAVENOUS at 05:35

## 2021-01-12 RX ADMIN — SODIUM CHLORIDE, SODIUM LACTATE, POTASSIUM CHLORIDE, AND CALCIUM CHLORIDE SCH MLS/HR: 600; 310; 30; 20 INJECTION, SOLUTION INTRAVENOUS at 00:35

## 2021-01-12 RX ADMIN — INSULIN ASPART SCH UNIT: 100 INJECTION, SOLUTION INTRAVENOUS; SUBCUTANEOUS at 06:40

## 2021-01-12 RX ADMIN — ENOXAPARIN SODIUM SCH MG: 100 INJECTION SUBCUTANEOUS at 09:02

## 2021-01-12 RX ADMIN — SODIUM CHLORIDE SCH MLS/HR: 900 INJECTION INTRAVENOUS at 21:30

## 2021-01-12 RX ADMIN — ENOXAPARIN SODIUM SCH MG: 100 INJECTION SUBCUTANEOUS at 21:30

## 2021-01-12 RX ADMIN — SODIUM CHLORIDE, SODIUM LACTATE, POTASSIUM CHLORIDE, CALCIUM CHLORIDE, AND DEXTROSE MONOHYDRATE SCH MLS/HR: 600; 310; 30; 20; 5 INJECTION, SOLUTION INTRAVENOUS at 00:35

## 2021-01-12 RX ADMIN — SODIUM CHLORIDE, SODIUM LACTATE, POTASSIUM CHLORIDE, AND CALCIUM CHLORIDE SCH MLS/HR: 600; 310; 30; 20 INJECTION, SOLUTION INTRAVENOUS at 05:49

## 2021-01-12 RX ADMIN — SODIUM CHLORIDE SCH MLS/HR: 900 INJECTION, SOLUTION INTRAVENOUS at 20:22

## 2021-01-12 RX ADMIN — ALBUTEROL SULFATE SCH PUFF: 90 AEROSOL, METERED RESPIRATORY (INHALATION) at 06:35

## 2021-01-12 NOTE — DIAGNOSTIC IMAGING REPORT
CHEST 1 VIEW, AP/PA ONLY



Indication: Dyspnea.



Comparison: 01/10/2021



Findings:

Worsening of multifocal peripheral pulmonary consolidations in

both lungs. No pleural effusion or pneumothorax. Normal

cardiomediastinal silhouette.



Impression: 

1. Worsening of bibasilar pulmonary consolidations likely due to

multifocal pneumonia.



Dictated by: 



  Dictated on workstation # [id number]

## 2021-01-12 NOTE — PROGRESS NOTE - HOSPITALIST
Subjective


HPI/CC On Admission


Date Seen by Provider:  2021


CC: SOB 





HPI: This is a 50yoWM clinic Pt of [facility] who has no medical history since he never

goes to the doctor who presented with Covid-positive swab results with SOB of 

hypoxia. He was found to have new onset DM and appears to have high risk for 

severe SHYAM. He was placed on protocol for Covid-19 treatment along with oxygen 

supplementation and overall he will be monitored closely for decompensation 

because he appears to be very high risk for intubation and respiratory failure. 

Lovenox was changed to 60 mg BID due to increased BMI. He is a lifetime 

nonsmoker and does not drink alcohol. He lives at home with his partner and he 

is retired from the [employer] system in [place].





Focused Exam


Lactate Level


21 21:50: Lactic Acid Level 2.93*H


21 00:03: Lactic Acid Level 2.46*H


21 02:52: Lactic Acid Level 2.00








Objective


Exam


Vital Signs





Vital Signs








  Date Time  Temp Pulse Resp B/P (MAP) Pulse Ox O2 Delivery O2 Flow Rate FiO2


 


21 10:18     95 Vapotherm 40.00 90


 


21 06:36  76      


 


21 06:00    154/91 (112)    


 


21 04:00 35.8       


 


21 22:00   18     





Capillary Refill : Less Than 3 SecondsLess Than 3 Seconds





Results/Procedures


Lab


Laboratory Tests


21 16:15








21 18:35








21 21:50








21 00:03








21 02:52








Patient resulted labs reviewed.





Diagnosis/Problems


Diagnosis/Problems





(1) Acute respiratory failure due to COVID-19


Status:  Acute


(2) Pneumonia due to COVID-19 virus


Status:  Acute


(3) Uncontrolled diabetes mellitus


Status:  Acute


(4) Sepsis


(5) Dehydration


Status:  Acute


(6) Non-compliance


Status:  Acute


(7) Renal insufficiency


Status:  Acute





Clinical Quality Measures


DVT/VTE Risk/Contraindication:


Risk Factor Score Per Nursin


RFS Level Per Nursing on Admit:  4+=Very High











MARCO A HERNADEZ DO                2021 08:52

## 2021-01-12 NOTE — PULMONARY CONSULTATION
History of Present Illness


History of Present Illness


Date Seen by Provider:  Jan 12, 2021


Time Seen by Provider:  04:58


Date of Admission





History of Present Illness


51yo admitted to 4th floor secondary to COVID and worsening SOB. Pt transferred 

to ICU last night secondary to acute DKA and worsening SOB.





Allergies and Home Medications


Allergies


Coded Allergies:  


     No Known Drug Allergies (Unverified , 1/24/13)





Home Medications


Amoxicillin 500 Mg Capsule, 500 MG PO BID, (Reported)


   FILLED 01- #20/10 DAY SUPPLY 


Meclizine HCl 25 Mg Tablet, 25 MG PO Q6H PRN for DIZZINESS, (Reported)





Past Medical-Social-Family Hx


Past Med/Social Hx:  Reviewed and Corrections made


Patient Social History


Alcohol Use:  Denies Use


Smoking Status:  Never a Smoker


2nd Hand Smoke Exposure:  No


Recent Infectious Disease Expo:  Yes (pt covid positive)


Recent Hopitalizations:  No


Physical Abuse:  No


Sexual Abuse:  No


Mistreated:  No


Fear:  No





Seasonal Allergies


Seasonal Allergies:  No





Past Medical History


Surgeries:  Yes


Abdominal, Appendectomy, Bowel Surgery, Gallbladder


Respiratory:  No


Cardiac:  Yes


Hypertension


Neurological:  No


Reproductive Disorders:  No


Sexually Transmitted Disease:  No


HIV/AIDS:  No


Genitourinary:  No


Gastrointestinal:  Yes (APPY;JUAN; PARTIAL COLECTOMY)


Crohns Disease, Gall Bladder Disease


Musculoskeletal:  No


Endocrine:  Yes (NON-COMPLIANT-DOES NOT TAKE MEDS, CHECK GLUCOSE OR FOLLOW DIET)


Diabetes, Non-Insulin dep


HEENT:  Yes (02/2020--PERTONSILLAR ABSCESS/TRANSFERRED TO [place].NO SURGERY )


Cancer:  No


Psychosocial:  No


Integumentary:  No


Blood Disorders:  No


Adverse Reaction/Blood Tranf:  No





Sepsis Event


Evaluation


Height, Weight, BMI


Height: '"


Weight: lbs. oz. kg; 35.28 BMI


Method:





Exam


Exam





Vital Signs








  Date Time  Temp Pulse Resp B/P (MAP) Pulse Ox O2 Delivery O2 Flow Rate FiO2


 


1/12/21 03:03     91 Vapotherm 30.00 60


 


1/12/21 02:00  75  150/88 (108) 94 Vapotherm 30.00 





       70.00 


 


1/12/21 01:11      Vapotherm 30.00 





       70.00 


 


1/12/21 01:00  80  153/90 (111) 90 Vapotherm 30.00 





       60.00 


 


1/12/21 00:57  81      


 


1/12/21 00:17      Vapotherm 30.00 





       60.00 


 


1/12/21 00:05     93 Vapotherm 30.00 60


 


1/12/21 00:00  92  163/95 (117) 89 High Flow N/C 12.00 


 


1/11/21 23:47 36.0       


 


1/11/21 23:00  88  157/91 (113) 95 High Flow N/C 12.00 


 


1/11/21 22:00  96 18 157/95 (115) 89 High Flow N/C 12.00 


 


1/11/21 21:00  96 18 153/91 (111) 90 High Flow N/C 12.00 


 


1/11/21 20:00  105 18 150/92 (111) 92 High Flow N/C 12.00 


 


1/11/21 20:00     91 High Flow N/C 12.00 


 


1/11/21 19:26 36.1       


 


1/11/21 19:00  103 18 170/92 (118) 90 High Flow N/C 12.00 


 


1/11/21 19:00  98      


 


1/11/21 18:17 37.0     High Flow N/C 12.00 


 


1/11/21 16:00 36.4 108 18 137/79 (98) 90 Nasal Cannula 4.00 


 


1/11/21 14:30     94 Nasal Cannula 3.00 


 


1/11/21 12:09  84      


 


1/11/21 12:00 35.6 81 16 152/76 (101) 95 Nasal Cannula 3.00 


 


1/11/21 10:29     93 Nasal Cannula 3.00 


 


1/11/21 08:29 35.3 82 20 176/96 (122) 94 Nasal Cannula 3.00 


 


1/11/21 08:00     90 Nasal Cannula 4.00 


 


1/11/21 06:30  76      














I & O 


 


 1/12/21





 07:00


 


Intake Total 1450 ml


 


Output Total 2400 ml


 


Balance -950 ml








Height & Weight


Height: '"


Weight: lbs. oz. kg; 35.28 BMI


Method:


General Appearance:  Other (VERY LETHARGIC, MINIMALLY VERBAL, SKIN VERY WARM AND

FLUSHED)


HEENT:  PERRL/EOMI


Neck:  Normal Inspection


Respiratory:  Normal Breath Sounds (BUT DECREASED AERATION IN BASES), No 

Accessory Muscle Use, No Respiratory Distress


Cardiovascular:  No Edema, No Murmur, Tachycardia (130'S)


Capillary Refill:  Less Than 3 Seconds


Extremity:  No Pedal Edema


Neurologic/Psychiatric:  Other (LETHARGIC, GROSS MOTOR/SENSORY INTACT, MINIMALLY

VERBAL --ANSWERS A FEW YES/NO QUESTIONS. )


Skin:  Other (FLUSHED, VERY WARM, )





Results


Lab


Laboratory Tests


1/10/21 19:43








1/11/21 04:26








1/11/21 16:15








1/11/21 18:35








1/11/21 21:50








1/12/21 00:03








1/12/21 02:52











Assessment/Plan


Assessment/Plan


Acute respiratory failure secondary to COVID


   -Currently on Vapotherm at 60% 


   -Currently on Remdesivir - Will D/C secondary to pt requiring high flow 

oxygen 


   -Decadron 


   -CVP 


   -Repeat DDIMer 


   -Influenza is negative 


Secondary pneumonia 


   -Continue rocephin and azithromycin 


   -Pan cultures 


Hypokalemia 


   -Replace 


Gi/DVT ppx











ANNE-MARIE PANCHAL DO              Jan 12, 2021 05:04

## 2021-01-13 VITALS — SYSTOLIC BLOOD PRESSURE: 163 MMHG | DIASTOLIC BLOOD PRESSURE: 102 MMHG

## 2021-01-13 VITALS — SYSTOLIC BLOOD PRESSURE: 159 MMHG | DIASTOLIC BLOOD PRESSURE: 94 MMHG

## 2021-01-13 VITALS — DIASTOLIC BLOOD PRESSURE: 104 MMHG | SYSTOLIC BLOOD PRESSURE: 173 MMHG

## 2021-01-13 VITALS — SYSTOLIC BLOOD PRESSURE: 159 MMHG | DIASTOLIC BLOOD PRESSURE: 100 MMHG

## 2021-01-13 VITALS — SYSTOLIC BLOOD PRESSURE: 144 MMHG | DIASTOLIC BLOOD PRESSURE: 89 MMHG

## 2021-01-13 VITALS — DIASTOLIC BLOOD PRESSURE: 92 MMHG | SYSTOLIC BLOOD PRESSURE: 152 MMHG

## 2021-01-13 VITALS — SYSTOLIC BLOOD PRESSURE: 172 MMHG | DIASTOLIC BLOOD PRESSURE: 109 MMHG

## 2021-01-13 VITALS — DIASTOLIC BLOOD PRESSURE: 101 MMHG | SYSTOLIC BLOOD PRESSURE: 165 MMHG

## 2021-01-13 VITALS — DIASTOLIC BLOOD PRESSURE: 97 MMHG | SYSTOLIC BLOOD PRESSURE: 154 MMHG

## 2021-01-13 VITALS — SYSTOLIC BLOOD PRESSURE: 153 MMHG | DIASTOLIC BLOOD PRESSURE: 94 MMHG

## 2021-01-13 VITALS — DIASTOLIC BLOOD PRESSURE: 106 MMHG | SYSTOLIC BLOOD PRESSURE: 175 MMHG

## 2021-01-13 VITALS — DIASTOLIC BLOOD PRESSURE: 104 MMHG | SYSTOLIC BLOOD PRESSURE: 174 MMHG

## 2021-01-13 VITALS — DIASTOLIC BLOOD PRESSURE: 95 MMHG | SYSTOLIC BLOOD PRESSURE: 160 MMHG

## 2021-01-13 VITALS — DIASTOLIC BLOOD PRESSURE: 105 MMHG | SYSTOLIC BLOOD PRESSURE: 155 MMHG

## 2021-01-13 VITALS — SYSTOLIC BLOOD PRESSURE: 158 MMHG | DIASTOLIC BLOOD PRESSURE: 93 MMHG

## 2021-01-13 VITALS — DIASTOLIC BLOOD PRESSURE: 94 MMHG | SYSTOLIC BLOOD PRESSURE: 151 MMHG

## 2021-01-13 VITALS — DIASTOLIC BLOOD PRESSURE: 90 MMHG | SYSTOLIC BLOOD PRESSURE: 134 MMHG

## 2021-01-13 VITALS — SYSTOLIC BLOOD PRESSURE: 133 MMHG | DIASTOLIC BLOOD PRESSURE: 111 MMHG

## 2021-01-13 VITALS — DIASTOLIC BLOOD PRESSURE: 92 MMHG | SYSTOLIC BLOOD PRESSURE: 154 MMHG

## 2021-01-13 VITALS — SYSTOLIC BLOOD PRESSURE: 154 MMHG | DIASTOLIC BLOOD PRESSURE: 93 MMHG

## 2021-01-13 VITALS — DIASTOLIC BLOOD PRESSURE: 89 MMHG | SYSTOLIC BLOOD PRESSURE: 144 MMHG

## 2021-01-13 VITALS — DIASTOLIC BLOOD PRESSURE: 93 MMHG | SYSTOLIC BLOOD PRESSURE: 152 MMHG

## 2021-01-13 VITALS — DIASTOLIC BLOOD PRESSURE: 86 MMHG | SYSTOLIC BLOOD PRESSURE: 129 MMHG

## 2021-01-13 VITALS — DIASTOLIC BLOOD PRESSURE: 93 MMHG | SYSTOLIC BLOOD PRESSURE: 158 MMHG

## 2021-01-13 VITALS — SYSTOLIC BLOOD PRESSURE: 166 MMHG | DIASTOLIC BLOOD PRESSURE: 100 MMHG

## 2021-01-13 LAB
BASOPHILS # BLD AUTO: 0 10^3/UL (ref 0–0.1)
BASOPHILS NFR BLD AUTO: 0 % (ref 0–10)
BUN/CREAT SERPL: 19
CALCIUM SERPL-MCNC: 7.4 MG/DL (ref 8.5–10.1)
CHLORIDE SERPL-SCNC: 107 MMOL/L (ref 98–107)
CO2 SERPL-SCNC: 19 MMOL/L (ref 21–32)
CREAT SERPL-MCNC: 0.62 MG/DL (ref 0.6–1.3)
EOSINOPHIL # BLD AUTO: 0 10^3/UL (ref 0–0.3)
EOSINOPHIL NFR BLD AUTO: 0 % (ref 0–10)
GFR SERPLBLD BASED ON 1.73 SQ M-ARVRAT: > 60 ML/MIN
GLUCOSE SERPL-MCNC: 181 MG/DL (ref 70–105)
HCT VFR BLD CALC: 34 % (ref 40–54)
HGB BLD-MCNC: 11.5 G/DL (ref 13.3–17.7)
LYMPHOCYTES # BLD AUTO: 0.8 10^3/UL (ref 1–4)
LYMPHOCYTES NFR BLD AUTO: 12 % (ref 12–44)
MAGNESIUM SERPL-MCNC: 1.7 MG/DL (ref 1.6–2.4)
MANUAL DIFFERENTIAL PERFORMED BLD QL: NO
MCH RBC QN AUTO: 28 PG (ref 25–34)
MCHC RBC AUTO-ENTMCNC: 34 G/DL (ref 32–36)
MCV RBC AUTO: 83 FL (ref 80–99)
MONOCYTES # BLD AUTO: 0.5 10^3/UL (ref 0–1)
MONOCYTES NFR BLD AUTO: 7 % (ref 0–12)
NEUTROPHILS # BLD AUTO: 5.3 10^3/UL (ref 1.8–7.8)
NEUTROPHILS NFR BLD AUTO: 80 % (ref 42–75)
PHOSPHATE SERPL-MCNC: 2.5 MG/DL (ref 2.3–4.7)
PLATELET # BLD: 223 10^3/UL (ref 130–400)
PMV BLD AUTO: 11.5 FL (ref 9–12.2)
POTASSIUM SERPL-SCNC: 3.2 MMOL/L (ref 3.6–5)
SODIUM SERPL-SCNC: 140 MMOL/L (ref 135–145)
WBC # BLD AUTO: 6.6 10^3/UL (ref 4.3–11)

## 2021-01-13 RX ADMIN — INSULIN ASPART SCH UNIT: 100 INJECTION, SOLUTION INTRAVENOUS; SUBCUTANEOUS at 11:47

## 2021-01-13 RX ADMIN — METOPROLOL TARTRATE SCH MG: 25 TABLET, FILM COATED ORAL at 16:37

## 2021-01-13 RX ADMIN — SODIUM CHLORIDE SCH MLS/HR: 900 INJECTION INTRAVENOUS at 20:57

## 2021-01-13 RX ADMIN — SODIUM CHLORIDE, SODIUM LACTATE, POTASSIUM CHLORIDE, AND CALCIUM CHLORIDE SCH MLS/HR: 600; 310; 30; 20 INJECTION, SOLUTION INTRAVENOUS at 08:24

## 2021-01-13 RX ADMIN — INSULIN ASPART SCH UNIT: 100 INJECTION, SOLUTION INTRAVENOUS; SUBCUTANEOUS at 08:00

## 2021-01-13 RX ADMIN — INSULIN ASPART SCH UNIT: 100 INJECTION, SOLUTION INTRAVENOUS; SUBCUTANEOUS at 16:37

## 2021-01-13 RX ADMIN — ALBUTEROL SULFATE SCH PUFF: 90 AEROSOL, METERED RESPIRATORY (INHALATION) at 06:34

## 2021-01-13 RX ADMIN — ALBUTEROL SULFATE SCH PUFF: 90 AEROSOL, METERED RESPIRATORY (INHALATION) at 14:17

## 2021-01-13 RX ADMIN — SODIUM CHLORIDE, SODIUM LACTATE, POTASSIUM CHLORIDE, AND CALCIUM CHLORIDE SCH MLS/HR: 600; 310; 30; 20 INJECTION, SOLUTION INTRAVENOUS at 00:56

## 2021-01-13 RX ADMIN — ALBUTEROL SULFATE SCH GM: 90 AEROSOL, METERED RESPIRATORY (INHALATION) at 20:02

## 2021-01-13 RX ADMIN — MAGNESIUM SULFATE IN DEXTROSE SCH MLS/HR: 10 INJECTION, SOLUTION INTRAVENOUS at 08:22

## 2021-01-13 RX ADMIN — INSULIN ASPART SCH UNIT: 100 INJECTION, SOLUTION INTRAVENOUS; SUBCUTANEOUS at 00:06

## 2021-01-13 RX ADMIN — RISPERIDONE SCH MG: 0.5 TABLET, FILM COATED ORAL at 20:58

## 2021-01-13 RX ADMIN — ENOXAPARIN SODIUM SCH MG: 100 INJECTION SUBCUTANEOUS at 08:23

## 2021-01-13 RX ADMIN — ALBUTEROL SULFATE SCH GM: 90 AEROSOL, METERED RESPIRATORY (INHALATION) at 23:43

## 2021-01-13 RX ADMIN — ENOXAPARIN SODIUM SCH MG: 100 INJECTION SUBCUTANEOUS at 20:57

## 2021-01-13 RX ADMIN — RISPERIDONE SCH MG: 0.5 TABLET, FILM COATED ORAL at 08:23

## 2021-01-13 RX ADMIN — ALBUTEROL SULFATE SCH PUFF: 90 AEROSOL, METERED RESPIRATORY (INHALATION) at 10:26

## 2021-01-13 RX ADMIN — MAGNESIUM SULFATE IN DEXTROSE SCH MLS/HR: 10 INJECTION, SOLUTION INTRAVENOUS at 05:15

## 2021-01-13 RX ADMIN — INSULIN ASPART SCH UNIT: 100 INJECTION, SOLUTION INTRAVENOUS; SUBCUTANEOUS at 20:00

## 2021-01-13 RX ADMIN — FAMOTIDINE SCH MG: 10 INJECTION INTRAVENOUS at 08:23

## 2021-01-13 RX ADMIN — METOPROLOL TARTRATE SCH MG: 25 TABLET, FILM COATED ORAL at 20:58

## 2021-01-13 RX ADMIN — INSULIN ASPART SCH UNIT: 100 INJECTION, SOLUTION INTRAVENOUS; SUBCUTANEOUS at 23:40

## 2021-01-13 RX ADMIN — INSULIN ASPART SCH UNIT: 100 INJECTION, SOLUTION INTRAVENOUS; SUBCUTANEOUS at 04:07

## 2021-01-13 RX ADMIN — MAGNESIUM SULFATE IN DEXTROSE SCH MLS/HR: 10 INJECTION, SOLUTION INTRAVENOUS at 06:35

## 2021-01-13 RX ADMIN — SODIUM CHLORIDE SCH MLS/HR: 900 INJECTION, SOLUTION INTRAVENOUS at 20:57

## 2021-01-13 NOTE — PULMONARY PROGRESS NOTE
Subjective


Time Seen by a Provider:  05:09


Subjective/Events-last exam


Pt is requiring 100% Vapotherm.





Sepsis Event


Evaluation


Height, Weight, BMI


Height: '"


Weight: lbs. oz. kg; 35.28 BMI


Method:





Focused Exam


Lactate Level


1/11/21 21:50: Lactic Acid Level 2.93*H


1/12/21 00:03: Lactic Acid Level 2.46*H


1/12/21 02:52: Lactic Acid Level 2.00





Exam


Exam





Vital Signs








  Date Time  Temp Pulse Resp B/P (MAP) Pulse Ox O2 Delivery O2 Flow Rate FiO2


 


1/13/21 05:00  72  173/104 (127) 92 Vapotherm 30.00 





       100.00 


 


1/13/21 04:00  64  165/101 (119) 95 Vapotherm 30.00 





       100.00 


 


1/13/21 03:00  72  175/106 (131) 93 Vapotherm 30.00 





       100.00 


 


1/13/21 02:10     93 Vapotherm 40.00 90


 


1/13/21 02:00  79  160/95 (120) 86 Vapotherm 30.00 





       100.00 


 


1/13/21 01:00  67  152/93 (114) 90 Vapotherm 30.00 





       100.00 


 


1/13/21 00:44  68      


 


1/13/21 00:00  72  154/93 (105) 96 Vapotherm 30.00 





       100.00 


 


1/12/21 23:17      Vapotherm 30.00 





       100.00 


 


1/12/21 23:09 35.0       


 


1/12/21 23:00  75  160/100 (126) 92 Vapotherm 30.00 





       70.00 


 


1/12/21 22:00  69  151/97 (115) 97 Vapotherm 30.00 





       70.00 


 


1/12/21 21:50     94 Vapotherm 40.00 90


 


1/12/21 21:00  79  157/98 (117) 92 Vapotherm 30.00 





       70.00 


 


1/12/21 20:00  86  158/101 (134) 95 Vapotherm 30.00 





       70.00 


 


1/12/21 20:00     95 Vapotherm 40.00 90


 


1/12/21 20:00      Vapotherm 30.00 





       70.00 


 


1/12/21 19:26 35.8       


 


1/12/21 19:05     95 Vapotherm 40.00 90


 


1/12/21 19:00  85  157/99 (113) 95   


 


1/12/21 19:00  85  157/99 (118) 95 Vapotherm 30.00 





       70.00 


 


1/12/21 19:00  89      


 


1/12/21 18:15  96   93   


 


1/12/21 18:00  96  157/88 (111) 92 Vapotherm 30.00 





       70.00 


 


1/12/21 18:00  95  157/88 (113) 92   


 


1/12/21 17:15  95   91   


 


1/12/21 17:00  83  177/98 (124) 88 Vapotherm 30.00 





       70.00 


 


1/12/21 17:00  94  177/98 (125) 90   


 


1/12/21 16:15  90   90   


 


1/12/21 16:00  96  157/99 (118) 91 Vapotherm 30.00 





       70.00 


 


1/12/21 15:55 36.6       


 


1/12/21 15:00  93  152/88 (109) 93 Vapotherm 30.00 





       70.00 


 


1/12/21 14:07     96 Vapotherm 40.00 90


 


1/12/21 14:00  77  134/75 (94) 92 Vapotherm 30.00 





       70.00 


 


1/12/21 13:00  83  129/73 (91) 90 Vapotherm 30.00 





       70.00 


 


1/12/21 12:34  78      


 


1/12/21 12:00  78  162/89 (113) 92 Vapotherm 30.00 





       70.00 


 


1/12/21 10:18     95 Vapotherm 40.00 90


 


1/12/21 10:00  80  148/90 (109) 88 Vapotherm 30.00 





       70.00 


 


1/12/21 09:00  80  148/90 (109) 90 Vapotherm 30.00 





       70.00 


 


1/12/21 08:00  71  150/91 (110) 94 Vapotherm 30.00 





       70.00 


 


1/12/21 08:00     95 Vapotherm 40.00 100


 


1/12/21 07:00  74  148/93 (111) 96 Vapotherm 30.00 





       70.00 


 


1/12/21 06:36  76      


 


1/12/21 06:35     95 Vapotherm 35.00 80


 


1/12/21 06:00  79  154/91 (112) 86 Vapotherm 30.00 





       70.00 














I & O 


 


 1/13/21





 07:00


 


Intake Total 1790 ml


 


Output Total 2175 ml


 


Balance -385 ml








Height & Weight


Height: '"


Weight: lbs. oz. kg; 35.28 BMI


Method:


General Appearance:  Anxious, Chronically ill, Moderate Distress, Obese


HEENT:  PERRL/EOMI, Normal ENT Inspection, Pharynx Normal, Moist Mucous 

Membranes


Neck:  Full Range of Motion, Normal Inspection, Non Tender


Respiratory:  Chest Non Tender, Lungs Clear, Normal Breath Sounds, Accessory 

Muscle Use, Decreased Breath Sounds


Cardiovascular:  Regular Rate, Rhythm, No Edema, No Gallop, No JVD, No Murmur, 

Normal Peripheral Pulses


Capillary Refill:  Less Than 3 Seconds


Extremity:  Normal Capillary Refill, Normal Inspection, Normal Range of Motion, 

Non Tender, No Calf Tenderness, No Pedal Edema


Neurologic/Psychiatric:  Alert, Oriented x3, No Motor/Sensory Deficits, Normal 

Mood/Affect


Skin:  Normal Color, Warm/Dry


Lymphatic:  No Adenopathy





Results


Lab


Laboratory Tests


1/11/21 16:15








1/11/21 18:35








1/11/21 21:50








1/12/21 00:03








1/12/21 02:52








1/13/21 02:07











Assessment/Plan


Assessment/Plan


Acute respiratory failure secondary to COVID


   -Currently on Vapotherm at 100%


      -BiPAP prn 


      -Low threshold for intubation 


   - Remdesivir - Does not qualify 


   -Decadron 


   -CVP 


   -Repeat DDIMer 


   -Influenza is negative 


Secondary pneumonia 


   -Continue rocephin and azithromycin 


   -Pan cultures 


Agitation 


   -Start risperdol and PRN Haldol 


   -PRN Morphine 


Hypokalemia 


   -Replace 


Gi/DVT ppx











ANNE-MARIE PANCHAL DO              Jan 13, 2021 05:12

## 2021-01-13 NOTE — DIAGNOSTIC IMAGING REPORT
Indication: Dyspnea



Portable chest shows cardiomegaly with normal vascularity. There

are bilateral infiltrates. There is no effusion or pneumothorax.

Right arm PICC line is present with tip in the right atrium.



IMPRESSION: Stable infiltrates compared to 01/12/2021. The PICC

line tip is in the inferior right atrium.



Dictated by: 



  Dictated on workstation # [id number]

## 2021-01-14 VITALS — DIASTOLIC BLOOD PRESSURE: 91 MMHG | SYSTOLIC BLOOD PRESSURE: 162 MMHG

## 2021-01-14 VITALS — SYSTOLIC BLOOD PRESSURE: 152 MMHG | DIASTOLIC BLOOD PRESSURE: 95 MMHG

## 2021-01-14 VITALS — DIASTOLIC BLOOD PRESSURE: 101 MMHG | SYSTOLIC BLOOD PRESSURE: 160 MMHG

## 2021-01-14 VITALS — SYSTOLIC BLOOD PRESSURE: 164 MMHG | DIASTOLIC BLOOD PRESSURE: 100 MMHG

## 2021-01-14 VITALS — SYSTOLIC BLOOD PRESSURE: 162 MMHG | DIASTOLIC BLOOD PRESSURE: 89 MMHG

## 2021-01-14 VITALS — SYSTOLIC BLOOD PRESSURE: 155 MMHG | DIASTOLIC BLOOD PRESSURE: 102 MMHG

## 2021-01-14 VITALS — SYSTOLIC BLOOD PRESSURE: 154 MMHG | DIASTOLIC BLOOD PRESSURE: 95 MMHG

## 2021-01-14 VITALS — SYSTOLIC BLOOD PRESSURE: 135 MMHG | DIASTOLIC BLOOD PRESSURE: 79 MMHG

## 2021-01-14 VITALS — SYSTOLIC BLOOD PRESSURE: 160 MMHG | DIASTOLIC BLOOD PRESSURE: 94 MMHG

## 2021-01-14 VITALS — DIASTOLIC BLOOD PRESSURE: 97 MMHG | SYSTOLIC BLOOD PRESSURE: 166 MMHG

## 2021-01-14 VITALS — SYSTOLIC BLOOD PRESSURE: 138 MMHG | DIASTOLIC BLOOD PRESSURE: 93 MMHG

## 2021-01-14 VITALS — DIASTOLIC BLOOD PRESSURE: 101 MMHG | SYSTOLIC BLOOD PRESSURE: 164 MMHG

## 2021-01-14 VITALS — SYSTOLIC BLOOD PRESSURE: 151 MMHG | DIASTOLIC BLOOD PRESSURE: 84 MMHG

## 2021-01-14 VITALS — DIASTOLIC BLOOD PRESSURE: 95 MMHG | SYSTOLIC BLOOD PRESSURE: 162 MMHG

## 2021-01-14 VITALS — SYSTOLIC BLOOD PRESSURE: 147 MMHG | DIASTOLIC BLOOD PRESSURE: 81 MMHG

## 2021-01-14 VITALS — DIASTOLIC BLOOD PRESSURE: 96 MMHG | SYSTOLIC BLOOD PRESSURE: 155 MMHG

## 2021-01-14 VITALS — DIASTOLIC BLOOD PRESSURE: 46 MMHG | SYSTOLIC BLOOD PRESSURE: 169 MMHG

## 2021-01-14 VITALS — DIASTOLIC BLOOD PRESSURE: 102 MMHG | SYSTOLIC BLOOD PRESSURE: 166 MMHG

## 2021-01-14 VITALS — SYSTOLIC BLOOD PRESSURE: 154 MMHG | DIASTOLIC BLOOD PRESSURE: 99 MMHG

## 2021-01-14 VITALS — DIASTOLIC BLOOD PRESSURE: 98 MMHG | SYSTOLIC BLOOD PRESSURE: 161 MMHG

## 2021-01-14 VITALS — DIASTOLIC BLOOD PRESSURE: 87 MMHG | SYSTOLIC BLOOD PRESSURE: 144 MMHG

## 2021-01-14 VITALS — DIASTOLIC BLOOD PRESSURE: 92 MMHG | SYSTOLIC BLOOD PRESSURE: 148 MMHG

## 2021-01-14 VITALS — SYSTOLIC BLOOD PRESSURE: 151 MMHG | DIASTOLIC BLOOD PRESSURE: 92 MMHG

## 2021-01-14 VITALS — DIASTOLIC BLOOD PRESSURE: 90 MMHG | SYSTOLIC BLOOD PRESSURE: 157 MMHG

## 2021-01-14 LAB
ARTERIAL PATENCY WRIST A: NO
BASE EXCESS STD BLDA CALC-SCNC: 5.8 MMOL/L (ref -2.5–2.5)
BASOPHILS # BLD AUTO: 0 10^3/UL (ref 0–0.1)
BASOPHILS NFR BLD AUTO: 0 % (ref 0–10)
BDY SITE: (no result)
BODY TEMPERATURE: 36
BUN/CREAT SERPL: 23
CALCIUM SERPL-MCNC: 7.9 MG/DL (ref 8.5–10.1)
CHLORIDE SERPL-SCNC: 105 MMOL/L (ref 98–107)
CO2 BLDA CALC-SCNC: 31.6 MMOL/L (ref 21–31)
CO2 SERPL-SCNC: 27 MMOL/L (ref 21–32)
CREAT SERPL-MCNC: 0.69 MG/DL (ref 0.6–1.3)
EOSINOPHIL # BLD AUTO: 0 10^3/UL (ref 0–0.3)
EOSINOPHIL NFR BLD AUTO: 0 % (ref 0–10)
GFR SERPLBLD BASED ON 1.73 SQ M-ARVRAT: > 60 ML/MIN
GLUCOSE SERPL-MCNC: 201 MG/DL (ref 70–105)
HCT VFR BLD CALC: 38 % (ref 40–54)
HGB BLD-MCNC: 12.7 G/DL (ref 13.3–17.7)
LYMPHOCYTES # BLD AUTO: 1.1 10^3/UL (ref 1–4)
LYMPHOCYTES NFR BLD AUTO: 14 % (ref 12–44)
MAGNESIUM SERPL-MCNC: 2 MG/DL (ref 1.6–2.4)
MANUAL DIFFERENTIAL PERFORMED BLD QL: NO
MCH RBC QN AUTO: 28 PG (ref 25–34)
MCHC RBC AUTO-ENTMCNC: 34 G/DL (ref 32–36)
MCV RBC AUTO: 82 FL (ref 80–99)
MONOCYTES # BLD AUTO: 0.6 10^3/UL (ref 0–1)
MONOCYTES NFR BLD AUTO: 8 % (ref 0–12)
NEUTROPHILS # BLD AUTO: 5.8 10^3/UL (ref 1.8–7.8)
NEUTROPHILS NFR BLD AUTO: 77 % (ref 42–75)
PCO2 BLDA: 45 MMHG (ref 35–45)
PH BLDA: 7.44 [PH] (ref 7.37–7.43)
PHOSPHATE SERPL-MCNC: 3.3 MG/DL (ref 2.3–4.7)
PLATELET # BLD: 320 10^3/UL (ref 130–400)
PMV BLD AUTO: 11.1 FL (ref 9–12.2)
PO2 BLDA: 38 MMHG (ref 79–93)
POTASSIUM SERPL-SCNC: 2.9 MMOL/L (ref 3.6–5)
SAO2 % BLDA FROM PO2: 68 % (ref 94–100)
SODIUM SERPL-SCNC: 143 MMOL/L (ref 135–145)
VENTILATION MODE VENT: NO
WBC # BLD AUTO: 7.6 10^3/UL (ref 4.3–11)

## 2021-01-14 RX ADMIN — POTASSIUM CHLORIDE SCH MLS/HR: 200 INJECTION, SOLUTION INTRAVENOUS at 11:26

## 2021-01-14 RX ADMIN — INSULIN ASPART SCH UNIT: 100 INJECTION, SOLUTION INTRAVENOUS; SUBCUTANEOUS at 04:21

## 2021-01-14 RX ADMIN — POTASSIUM CHLORIDE SCH MLS/HR: 200 INJECTION, SOLUTION INTRAVENOUS at 07:35

## 2021-01-14 RX ADMIN — INSULIN ASPART SCH UNIT: 100 INJECTION, SOLUTION INTRAVENOUS; SUBCUTANEOUS at 23:05

## 2021-01-14 RX ADMIN — METOPROLOL TARTRATE SCH MG: 25 TABLET, FILM COATED ORAL at 20:36

## 2021-01-14 RX ADMIN — INSULIN ASPART SCH UNIT: 100 INJECTION, SOLUTION INTRAVENOUS; SUBCUTANEOUS at 11:26

## 2021-01-14 RX ADMIN — ALBUTEROL SULFATE SCH GM: 90 AEROSOL, METERED RESPIRATORY (INHALATION) at 02:27

## 2021-01-14 RX ADMIN — RISPERIDONE SCH MG: 0.5 TABLET, FILM COATED ORAL at 20:36

## 2021-01-14 RX ADMIN — INSULIN ASPART SCH UNIT: 100 INJECTION, SOLUTION INTRAVENOUS; SUBCUTANEOUS at 19:34

## 2021-01-14 RX ADMIN — ALBUTEROL SULFATE SCH GM: 90 AEROSOL, METERED RESPIRATORY (INHALATION) at 21:32

## 2021-01-14 RX ADMIN — SODIUM CHLORIDE, SODIUM LACTATE, POTASSIUM CHLORIDE, AND CALCIUM CHLORIDE SCH MLS/HR: 600; 310; 30; 20 INJECTION, SOLUTION INTRAVENOUS at 23:46

## 2021-01-14 RX ADMIN — INSULIN ASPART SCH UNIT: 100 INJECTION, SOLUTION INTRAVENOUS; SUBCUTANEOUS at 17:22

## 2021-01-14 RX ADMIN — ALBUTEROL SULFATE SCH GM: 90 AEROSOL, METERED RESPIRATORY (INHALATION) at 10:53

## 2021-01-14 RX ADMIN — FAMOTIDINE SCH MG: 10 INJECTION INTRAVENOUS at 08:48

## 2021-01-14 RX ADMIN — MAGNESIUM SULFATE IN DEXTROSE SCH MLS/HR: 10 INJECTION, SOLUTION INTRAVENOUS at 04:21

## 2021-01-14 RX ADMIN — POTASSIUM CHLORIDE SCH MLS/HR: 200 INJECTION, SOLUTION INTRAVENOUS at 08:42

## 2021-01-14 RX ADMIN — RISPERIDONE SCH MG: 0.5 TABLET, FILM COATED ORAL at 08:48

## 2021-01-14 RX ADMIN — ALBUTEROL SULFATE SCH GM: 90 AEROSOL, METERED RESPIRATORY (INHALATION) at 18:30

## 2021-01-14 RX ADMIN — ALBUTEROL SULFATE SCH GM: 90 AEROSOL, METERED RESPIRATORY (INHALATION) at 14:43

## 2021-01-14 RX ADMIN — ENOXAPARIN SODIUM SCH MG: 100 INJECTION SUBCUTANEOUS at 20:36

## 2021-01-14 RX ADMIN — POTASSIUM CHLORIDE SCH MLS/HR: 200 INJECTION, SOLUTION INTRAVENOUS at 06:32

## 2021-01-14 RX ADMIN — METOPROLOL TARTRATE SCH MG: 25 TABLET, FILM COATED ORAL at 08:48

## 2021-01-14 RX ADMIN — SODIUM CHLORIDE, SODIUM LACTATE, POTASSIUM CHLORIDE, AND CALCIUM CHLORIDE SCH MLS/HR: 600; 310; 30; 20 INJECTION, SOLUTION INTRAVENOUS at 04:23

## 2021-01-14 RX ADMIN — INSULIN ASPART SCH UNIT: 100 INJECTION, SOLUTION INTRAVENOUS; SUBCUTANEOUS at 07:35

## 2021-01-14 RX ADMIN — POTASSIUM CHLORIDE SCH MLS/HR: 200 INJECTION, SOLUTION INTRAVENOUS at 05:27

## 2021-01-14 RX ADMIN — SODIUM CHLORIDE SCH MLS/HR: 900 INJECTION INTRAVENOUS at 20:36

## 2021-01-14 RX ADMIN — POTASSIUM CHLORIDE SCH MEQ: 1500 TABLET, EXTENDED RELEASE ORAL at 04:22

## 2021-01-14 RX ADMIN — ALBUTEROL SULFATE SCH GM: 90 AEROSOL, METERED RESPIRATORY (INHALATION) at 06:43

## 2021-01-14 RX ADMIN — POTASSIUM CHLORIDE SCH MLS/HR: 200 INJECTION, SOLUTION INTRAVENOUS at 05:17

## 2021-01-14 RX ADMIN — LABETALOL HYDROCHLORIDE PRN MG: 5 INJECTION, SOLUTION INTRAVENOUS at 23:40

## 2021-01-14 RX ADMIN — SODIUM CHLORIDE SCH MLS/HR: 900 INJECTION, SOLUTION INTRAVENOUS at 19:34

## 2021-01-14 RX ADMIN — ENOXAPARIN SODIUM SCH MG: 100 INJECTION SUBCUTANEOUS at 08:50

## 2021-01-14 NOTE — PULMONARY PROGRESS NOTE
Subjective


Date Seen by a Provider:  Jan 14, 2021


Time Seen by a Provider:  05:09


Subjective/Events-last exam


Pt is on BiPAP currently at 60%





Sepsis Event


Evaluation


Height, Weight, BMI


Height: '"


Weight: lbs. oz. kg; 35.28 BMI


Method:





Focused Exam


Lactate Level


1/11/21 21:50: Lactic Acid Level 2.93*H


1/12/21 00:03: Lactic Acid Level 2.46*H


1/12/21 02:52: Lactic Acid Level 2.00





Exam


Exam





Vital Signs








  Date Time  Temp Pulse Resp B/P (MAP) Pulse Ox O2 Delivery O2 Flow Rate FiO2


 


1/14/21 05:00  55 14 155/96 (115) 91 NIV Bilevel 85.00 


 


1/14/21 04:00  56 14 154/99 (117) 92 NIV Bilevel 85.00 


 


1/14/21 03:00  64 14 154/95 (114) 91 NIV Bilevel 85.00 


 


1/14/21 02:30 36.0     NIV Bilevel 85.00 


 


1/14/21 02:27  59 15  94  90.00 


 


1/14/21 02:00  56 14 162/89 (113) 96 NIV Bilevel 90.00 


 


1/14/21 01:00  62 14 147/81 (103) 93 NIV Bilevel 90.00 


 


1/14/21 00:48  62      


 


1/14/21 00:00  67 14 151/84 (106) 94 NIV Bilevel 90.00 


 


1/13/21 23:43  60 16  94  90.00 


 


1/13/21 23:35 36.5     NIV Bilevel 90.00 


 


1/13/21 23:00  72 14 134/90 (105) 96 NIV Bilevel 90.00 


 


1/13/21 22:00  74 14 129/86 (100) 95 NIV Bilevel 90.00 


 


1/13/21 21:30      NIV Bilevel 90.00 


 


1/13/21 21:05      Vapotherm 40.00 





       100.00 


 


1/13/21 21:00  65 14 155/105 (122) 92 NIV Bilevel 90.00 


 


1/13/21 20:59      NIV Bilevel 90.00 


 


1/13/21 20:02  58 16  95  90.00 


 


1/13/21 20:00  65 14 166/100 (122) 96 NIV Bilevel 85.00 


 


1/13/21 20:00     95 NIV Bilevel  90


 


1/13/21 19:11  61      


 


1/13/21 19:00 35.6 65 14 153/94 (113) 92 NIV Bilevel 90.00 


 


1/13/21 18:00  66  159/94 (115) 93 Vapotherm 30.00 





       100.00 


 


1/13/21 17:00  70  172/109 (130) 92 Vapotherm 30.00 





       100.00 


 


1/13/21 16:25 36.2 70   95   80


 


1/13/21 16:00  70  163/102 (122) 95 Vapotherm 30.00 





       100.00 


 


1/13/21 15:29 36.2       


 


1/13/21 15:00  72  174/104 (127) 95 Vapotherm 30.00 





       100.00 


 


1/13/21 14:22      Vapotherm 30.00 





       80.00 


 


1/13/21 14:18  71 22  97  80.00 


 


1/13/21 14:00  68   97 Vapotherm 30.00 





       100.00 


 


1/13/21 13:00  74  158/93 (114) 93 Vapotherm 30.00 





       100.00 


 


1/13/21 12:43  73      


 


1/13/21 12:00  76  133/111 (118) 96 Vapotherm 30.00 





       100.00 


 


1/13/21 11:40 35.9       


 


1/13/21 11:00  75  152/92 (112) 93 Vapotherm 30.00 





       100.00 


 


1/13/21 10:26  67 16  92  100.00 


 


1/13/21 10:00  68  144/89 (107) 92 Vapotherm 30.00 





       100.00 


 


1/13/21 09:00  67  154/92 (112) 91 Vapotherm 30.00 





       100.00 


 


1/13/21 08:00     95 NIV Bilevel 100.00 93


 


1/13/21 08:00  75  154/97 (116) 90 Vapotherm 30.00 





       100.00 


 


1/13/21 07:30 36.1       


 


1/13/21 07:00  66  151/94 (113) 92 Vapotherm 30.00 





       100.00 


 


1/13/21 06:36  68      


 


1/13/21 06:35  74 19  91  100.00 


 


1/13/21 06:00  70  159/100 (119) 94 Vapotherm 30.00 





       100.00 














I & O 


 


 1/14/21





 07:00


 


Intake Total 1605 ml


 


Output Total 2300 ml


 


Balance -695 ml








Height & Weight


Height: '"


Weight: lbs. oz. kg; 35.28 BMI


Method:


General Appearance:  Anxious, Chronically ill, Moderate Distress, Obese


HEENT:  PERRL/EOMI, Normal ENT Inspection, Pharynx Normal, Moist Mucous 

Membranes


Neck:  Full Range of Motion, Normal Inspection, Non Tender


Respiratory:  Chest Non Tender, Lungs Clear, Normal Breath Sounds, Accessory 

Muscle Use, Decreased Breath Sounds


Cardiovascular:  Regular Rate, Rhythm, No Edema, No Gallop, No JVD, No Murmur, 

Normal Peripheral Pulses


Capillary Refill:  Less Than 3 Seconds


Extremity:  Normal Capillary Refill, Normal Inspection, Normal Range of Motion, 

Non Tender, No Calf Tenderness, No Pedal Edema


Neurologic/Psychiatric:  Alert, Oriented x3, No Motor/Sensory Deficits, Normal 

Mood/Affect


Skin:  Normal Color, Warm/Dry


Lymphatic:  No Adenopathy





Results


Lab


Laboratory Tests


1/13/21 02:07








1/14/21 02:58











Assessment/Plan


Assessment/Plan


Acute respiratory failure secondary to COVID


   -Currently on BiPAP 85% 


      -ABG is venous 


      -Low threshold for intubation 


   -CXR pending 


   - Remdesivir - Does not qualify 


   -Decadron 


   -CVP 


   -Repeat DDIMer 


   -Influenza is negative 


Secondary pneumonia 


   -Continue rocephin and azithromycin 


   -Pan cultures 


Hypokalemia 


   -Replace 


Agitation 


   -Start risperdol and PRN Haldol 


   -PRN Morphine 


Gi/DVT ppx











ANNE-MARIE PANCHAL DO              Jan 14, 2021 05:11

## 2021-01-14 NOTE — DIAGNOSTIC IMAGING REPORT
INDICATION: Dyspnea.



Comparison made to prior examination 01/13/2021.



FINDINGS: Heart size is stable. There are patchy bilateral

pulmonary infiltrates. There is no pleural effusion or

pneumothorax. Mediastinum is unremarkable. The right upper

extremity PICC line has its tip in the superior vena cava.



IMPRESSION: Patchy bilateral pulmonary infiltrates suspect for

atypical pneumonia possibly COVID. An underlying central

pulmonary venous congestion cannot be excluded.



Dictated by: 



  Dictated on workstation # [id number]

## 2021-01-15 VITALS — DIASTOLIC BLOOD PRESSURE: 97 MMHG | SYSTOLIC BLOOD PRESSURE: 174 MMHG

## 2021-01-15 VITALS — SYSTOLIC BLOOD PRESSURE: 168 MMHG | DIASTOLIC BLOOD PRESSURE: 94 MMHG

## 2021-01-15 VITALS — SYSTOLIC BLOOD PRESSURE: 162 MMHG | DIASTOLIC BLOOD PRESSURE: 95 MMHG

## 2021-01-15 VITALS — SYSTOLIC BLOOD PRESSURE: 174 MMHG | DIASTOLIC BLOOD PRESSURE: 97 MMHG

## 2021-01-15 VITALS — SYSTOLIC BLOOD PRESSURE: 166 MMHG | DIASTOLIC BLOOD PRESSURE: 99 MMHG

## 2021-01-15 VITALS — SYSTOLIC BLOOD PRESSURE: 146 MMHG | DIASTOLIC BLOOD PRESSURE: 105 MMHG

## 2021-01-15 VITALS — SYSTOLIC BLOOD PRESSURE: 149 MMHG | DIASTOLIC BLOOD PRESSURE: 81 MMHG

## 2021-01-15 VITALS — DIASTOLIC BLOOD PRESSURE: 89 MMHG | SYSTOLIC BLOOD PRESSURE: 154 MMHG

## 2021-01-15 VITALS — DIASTOLIC BLOOD PRESSURE: 100 MMHG | SYSTOLIC BLOOD PRESSURE: 172 MMHG

## 2021-01-15 VITALS — DIASTOLIC BLOOD PRESSURE: 98 MMHG | SYSTOLIC BLOOD PRESSURE: 176 MMHG

## 2021-01-15 VITALS — DIASTOLIC BLOOD PRESSURE: 89 MMHG | SYSTOLIC BLOOD PRESSURE: 170 MMHG

## 2021-01-15 VITALS — SYSTOLIC BLOOD PRESSURE: 159 MMHG | DIASTOLIC BLOOD PRESSURE: 93 MMHG

## 2021-01-15 VITALS — DIASTOLIC BLOOD PRESSURE: 105 MMHG | SYSTOLIC BLOOD PRESSURE: 170 MMHG

## 2021-01-15 VITALS — SYSTOLIC BLOOD PRESSURE: 169 MMHG | DIASTOLIC BLOOD PRESSURE: 90 MMHG

## 2021-01-15 VITALS — SYSTOLIC BLOOD PRESSURE: 168 MMHG | DIASTOLIC BLOOD PRESSURE: 89 MMHG

## 2021-01-15 VITALS — DIASTOLIC BLOOD PRESSURE: 111 MMHG | SYSTOLIC BLOOD PRESSURE: 183 MMHG

## 2021-01-15 VITALS — SYSTOLIC BLOOD PRESSURE: 163 MMHG | DIASTOLIC BLOOD PRESSURE: 90 MMHG

## 2021-01-15 VITALS — SYSTOLIC BLOOD PRESSURE: 147 MMHG | DIASTOLIC BLOOD PRESSURE: 82 MMHG

## 2021-01-15 VITALS — DIASTOLIC BLOOD PRESSURE: 94 MMHG | SYSTOLIC BLOOD PRESSURE: 161 MMHG

## 2021-01-15 VITALS — DIASTOLIC BLOOD PRESSURE: 91 MMHG | SYSTOLIC BLOOD PRESSURE: 150 MMHG

## 2021-01-15 VITALS — DIASTOLIC BLOOD PRESSURE: 101 MMHG | SYSTOLIC BLOOD PRESSURE: 160 MMHG

## 2021-01-15 VITALS — SYSTOLIC BLOOD PRESSURE: 144 MMHG | DIASTOLIC BLOOD PRESSURE: 85 MMHG

## 2021-01-15 VITALS — DIASTOLIC BLOOD PRESSURE: 98 MMHG | SYSTOLIC BLOOD PRESSURE: 161 MMHG

## 2021-01-15 VITALS — SYSTOLIC BLOOD PRESSURE: 170 MMHG | DIASTOLIC BLOOD PRESSURE: 95 MMHG

## 2021-01-15 VITALS — SYSTOLIC BLOOD PRESSURE: 188 MMHG | DIASTOLIC BLOOD PRESSURE: 103 MMHG

## 2021-01-15 VITALS — DIASTOLIC BLOOD PRESSURE: 93 MMHG | SYSTOLIC BLOOD PRESSURE: 168 MMHG

## 2021-01-15 VITALS — SYSTOLIC BLOOD PRESSURE: 158 MMHG | DIASTOLIC BLOOD PRESSURE: 89 MMHG

## 2021-01-15 LAB
ALBUMIN SERPL-MCNC: 2.6 GM/DL (ref 3.2–4.5)
ALP SERPL-CCNC: 58 U/L (ref 40–136)
ALT SERPL-CCNC: 22 U/L (ref 0–55)
BASOPHILS # BLD AUTO: 0 10^3/UL (ref 0–0.1)
BASOPHILS NFR BLD AUTO: 0 % (ref 0–10)
BILIRUB SERPL-MCNC: 0.6 MG/DL (ref 0.1–1)
BUN/CREAT SERPL: 24
CALCIUM SERPL-MCNC: 7.8 MG/DL (ref 8.5–10.1)
CHLORIDE SERPL-SCNC: 103 MMOL/L (ref 98–107)
CO2 SERPL-SCNC: 26 MMOL/L (ref 21–32)
CREAT SERPL-MCNC: 0.63 MG/DL (ref 0.6–1.3)
EOSINOPHIL # BLD AUTO: 0 10^3/UL (ref 0–0.3)
EOSINOPHIL NFR BLD AUTO: 0 % (ref 0–10)
GFR SERPLBLD BASED ON 1.73 SQ M-ARVRAT: > 60 ML/MIN
GLUCOSE SERPL-MCNC: 141 MG/DL (ref 70–105)
HCT VFR BLD CALC: 38 % (ref 40–54)
HGB BLD-MCNC: 12.8 G/DL (ref 13.3–17.7)
LYMPHOCYTES # BLD AUTO: 1.3 10^3/UL (ref 1–4)
LYMPHOCYTES NFR BLD AUTO: 17 % (ref 12–44)
MAGNESIUM SERPL-MCNC: 1.9 MG/DL (ref 1.6–2.4)
MANUAL DIFFERENTIAL PERFORMED BLD QL: NO
MCH RBC QN AUTO: 28 PG (ref 25–34)
MCHC RBC AUTO-ENTMCNC: 34 G/DL (ref 32–36)
MCV RBC AUTO: 83 FL (ref 80–99)
MONOCYTES # BLD AUTO: 0.6 10^3/UL (ref 0–1)
MONOCYTES NFR BLD AUTO: 8 % (ref 0–12)
NEUTROPHILS # BLD AUTO: 5.4 10^3/UL (ref 1.8–7.8)
NEUTROPHILS NFR BLD AUTO: 73 % (ref 42–75)
PHOSPHATE SERPL-MCNC: 3.7 MG/DL (ref 2.3–4.7)
PLATELET # BLD: 339 10^3/UL (ref 130–400)
PMV BLD AUTO: 10.9 FL (ref 9–12.2)
POTASSIUM SERPL-SCNC: 3.2 MMOL/L (ref 3.6–5)
PROT SERPL-MCNC: 5.4 GM/DL (ref 6.4–8.2)
SODIUM SERPL-SCNC: 142 MMOL/L (ref 135–145)
WBC # BLD AUTO: 7.4 10^3/UL (ref 4.3–11)

## 2021-01-15 RX ADMIN — METOPROLOL TARTRATE SCH MG: 25 TABLET, FILM COATED ORAL at 08:30

## 2021-01-15 RX ADMIN — POTASSIUM CHLORIDE SCH MLS/HR: 200 INJECTION, SOLUTION INTRAVENOUS at 06:21

## 2021-01-15 RX ADMIN — ALBUTEROL SULFATE SCH GM: 90 AEROSOL, METERED RESPIRATORY (INHALATION) at 02:15

## 2021-01-15 RX ADMIN — METOPROLOL TARTRATE SCH MG: 25 TABLET, FILM COATED ORAL at 20:10

## 2021-01-15 RX ADMIN — LABETALOL HYDROCHLORIDE PRN MG: 5 INJECTION, SOLUTION INTRAVENOUS at 02:39

## 2021-01-15 RX ADMIN — POTASSIUM CHLORIDE SCH MLS/HR: 200 INJECTION, SOLUTION INTRAVENOUS at 07:29

## 2021-01-15 RX ADMIN — POTASSIUM CHLORIDE SCH MLS/HR: 200 INJECTION, SOLUTION INTRAVENOUS at 06:22

## 2021-01-15 RX ADMIN — INSULIN ASPART SCH UNIT: 100 INJECTION, SOLUTION INTRAVENOUS; SUBCUTANEOUS at 12:25

## 2021-01-15 RX ADMIN — ENOXAPARIN SODIUM SCH MG: 100 INJECTION SUBCUTANEOUS at 20:11

## 2021-01-15 RX ADMIN — INSULIN ASPART SCH UNIT: 100 INJECTION, SOLUTION INTRAVENOUS; SUBCUTANEOUS at 08:18

## 2021-01-15 RX ADMIN — RISPERIDONE SCH MG: 0.5 TABLET, FILM COATED ORAL at 08:30

## 2021-01-15 RX ADMIN — ALBUTEROL SULFATE SCH PUFF: 90 AEROSOL, METERED RESPIRATORY (INHALATION) at 11:21

## 2021-01-15 RX ADMIN — ALBUTEROL SULFATE SCH PUFF: 90 AEROSOL, METERED RESPIRATORY (INHALATION) at 15:29

## 2021-01-15 RX ADMIN — ALBUTEROL SULFATE SCH GM: 90 AEROSOL, METERED RESPIRATORY (INHALATION) at 02:26

## 2021-01-15 RX ADMIN — LABETALOL HYDROCHLORIDE PRN MG: 5 INJECTION, SOLUTION INTRAVENOUS at 05:28

## 2021-01-15 RX ADMIN — POTASSIUM CHLORIDE SCH MLS/HR: 200 INJECTION, SOLUTION INTRAVENOUS at 04:45

## 2021-01-15 RX ADMIN — FAMOTIDINE SCH MG: 10 INJECTION INTRAVENOUS at 08:30

## 2021-01-15 RX ADMIN — SODIUM CHLORIDE, SODIUM LACTATE, POTASSIUM CHLORIDE, AND CALCIUM CHLORIDE SCH MLS/HR: 600; 310; 30; 20 INJECTION, SOLUTION INTRAVENOUS at 21:21

## 2021-01-15 RX ADMIN — POTASSIUM CHLORIDE SCH MLS/HR: 200 INJECTION, SOLUTION INTRAVENOUS at 05:19

## 2021-01-15 RX ADMIN — ALBUTEROL SULFATE SCH PUFF: 90 AEROSOL, METERED RESPIRATORY (INHALATION) at 18:24

## 2021-01-15 RX ADMIN — LABETALOL HYDROCHLORIDE PRN MG: 5 INJECTION, SOLUTION INTRAVENOUS at 16:57

## 2021-01-15 RX ADMIN — INSULIN ASPART SCH UNIT: 100 INJECTION, SOLUTION INTRAVENOUS; SUBCUTANEOUS at 16:58

## 2021-01-15 RX ADMIN — SODIUM CHLORIDE SCH MLS/HR: 900 INJECTION INTRAVENOUS at 20:10

## 2021-01-15 RX ADMIN — MAGNESIUM SULFATE IN DEXTROSE SCH MLS/HR: 10 INJECTION, SOLUTION INTRAVENOUS at 04:45

## 2021-01-15 RX ADMIN — ALBUTEROL SULFATE SCH PUFF: 90 AEROSOL, METERED RESPIRATORY (INHALATION) at 23:04

## 2021-01-15 RX ADMIN — ALBUTEROL SULFATE SCH GM: 90 AEROSOL, METERED RESPIRATORY (INHALATION) at 07:36

## 2021-01-15 RX ADMIN — INSULIN ASPART SCH UNIT: 100 INJECTION, SOLUTION INTRAVENOUS; SUBCUTANEOUS at 04:44

## 2021-01-15 RX ADMIN — ENOXAPARIN SODIUM SCH MG: 100 INJECTION SUBCUTANEOUS at 08:35

## 2021-01-15 RX ADMIN — POTASSIUM CHLORIDE SCH MEQ: 1500 TABLET, EXTENDED RELEASE ORAL at 04:45

## 2021-01-15 RX ADMIN — INSULIN ASPART SCH UNIT: 100 INJECTION, SOLUTION INTRAVENOUS; SUBCUTANEOUS at 20:10

## 2021-01-15 RX ADMIN — RISPERIDONE SCH MG: 0.5 TABLET, FILM COATED ORAL at 20:11

## 2021-01-15 NOTE — NUR
Initial visit: Pt was tearful and shared feelings of fear. During our conversation, he 
transitioned to sharing feelings of hopefulness and trust in God. He does not have a janelle 
community at this time, and states he and his wife feel "alone" in the community. Pt shared 
that his personal janelle in God is helping him through this time of struggle and uncertainty. 
 facilitated sharing of the pt's personal beliefs as a means of reflection and 
encouraging inner strength. He is communicating with his wife on his cellphone. Pt welcomed 
prayer.  closed the room curtain for privacy upon exiting.

## 2021-01-15 NOTE — PROGRESS NOTE - HOSPITALIST
LILIANE RODRIGUEZ, 1/15/21 1039:


Subjective


HPI/CC On Admission


CC: SOB 





HPI: This is a 50yoWM clinic Pt of [facility] who has no medical history since he never

goes to the doctor who presented with Covid-positive swab results with SOB of 

hypoxia. He was found to have new onset DM and appears to have high risk for 

severe SHYAM. He was placed on protocol for Covid-19 treatment along with oxygen 

supplementation and overall he will be monitored closely for decompensation 

because he appears to be very high risk for intubation and respiratory failure. 

Lovenox was changed to 60 mg BID due to increased BMI. He is a lifetime 

nonsmoker and does not drink alcohol. He lives at home with his partner and he 

is retired from the [employer] in [place].


Subjective/Events-last exam


Mr. Watts was seen and examined by Dr. Baig in the ICU for acute hypoxic 

respiratory failure secondary to COVID19. Patient states he feels he is improvin

g. He is currently on vapotherm 40% FiO2, satting 97%. His CXR today is improved

from prior. Currently on day 4 of both decadron and rocephin, completed course 

of azithromycin . I&O net negative for entire admission.





Objective


Exam


Vital Signs





Vital Signs








  Date Time  Temp Pulse Resp B/P (MAP) Pulse Ox O2 Delivery O2 Flow Rate FiO2


 


1/15/21 10:00  74  154/89 (110) 95 Vapotherm 40.00 





       90.00 


 


1/15/21 08:21 36.1       


 


1/15/21 08:08        95


 


1/15/21 02:15   17     





Capillary Refill : Less Than 3 SecondsLess Than 3 Seconds


Respiratory:  Lungs Clear, Normal Breath Sounds


Cardiovascular:  Regular Rate, Rhythm, Normal Peripheral Pulses


Gastrointestinal:  Non Tender, Soft





Results/Procedures


Lab


Laboratory Tests


1/15/21 02:47








Patient resulted labs reviewed.





Assessment/Plan


Assessment and Plan


Assess & Plan/Chief Complaint


Acute respiratory failure secondary to COVID19 infection


* Currently on vapotherm 40% FiO2, satting 97% 


   * Will monitor closely for decompensation


* CXR improved


* Patient does not qualify for remdesivir


* Decadron day 4





Secondary pneumonia 


* Continue rocephin, day 4


* Completed course of azithromycin 


* Albuterol





Hypertension


* Will monitor closely


* Does not have a current diagnosis of hypertension prior to admission





Hypokalemia 


* Replace 





Agitation 


* Continue risperdol and PRN haldol


* PRN Morphine 





Diet: CHO 60g


GI/DVT ppx: famotidine, lovenox


FULL CODE





Clinical Quality Measures


DVT/VTE Risk/Contraindication:


Risk Factor Score Per Nursin


RFS Level Per Nursing on Admit:  4+=Very High





BENITO BAIG MD 1/15/21 1503:


Subjective


HPI/CC On Admission


Date Seen by Provider:  Wilber 15, 2021


Time Seen by Provider:  08:35





Review of Systems


Pulmonary:  Dyspnea


Neurological:  Weakness





Objective


Exam


General Appearance:  Obese, Other


Neck:  Other (Short full neck)


Respiratory:  Chest Non Tender, Lungs Clear, Normal Breath Sounds, No Accessory 

Muscle Use, Other (Increased respiratory rate)


Cardiovascular:  Regular Rate, Rhythm


Gastrointestinal:  Non Tender, Soft


Extremity:  Pedal Edema


Neurologic/Psychiatric:  Alert, Oriented x3, Normal Mood/Affect





Results/Procedures


Imaging:  Reviewed Imaging Films, Reviewed Imaging Report





Supervisory-Addendum Brief


Verification & Attestation


Participated in pt care:  history, MDM, physical


Personally performed:  exam, history, MDM, supervision of care


Care discussed with:  Medical Student, other (Nursing)


Procedures:  n/a


Mr. Hayward seems to be better today.  He was alert and oriented and had eaten 

today.  He had no complaints and no questions other than when was he going to 

get out of here.  Oxygenation slightly improved from yesterday











LILIANE RODRIGUEZ,         Wilber 15, 2021 10:39


BENITO BAIG MD         Wilber 15, 2021 15:03

## 2021-01-16 VITALS — DIASTOLIC BLOOD PRESSURE: 84 MMHG | SYSTOLIC BLOOD PRESSURE: 159 MMHG

## 2021-01-16 VITALS — DIASTOLIC BLOOD PRESSURE: 86 MMHG | SYSTOLIC BLOOD PRESSURE: 135 MMHG

## 2021-01-16 VITALS — DIASTOLIC BLOOD PRESSURE: 89 MMHG | SYSTOLIC BLOOD PRESSURE: 163 MMHG

## 2021-01-16 VITALS — DIASTOLIC BLOOD PRESSURE: 88 MMHG | SYSTOLIC BLOOD PRESSURE: 159 MMHG

## 2021-01-16 VITALS — DIASTOLIC BLOOD PRESSURE: 97 MMHG | SYSTOLIC BLOOD PRESSURE: 154 MMHG

## 2021-01-16 VITALS — DIASTOLIC BLOOD PRESSURE: 98 MMHG | SYSTOLIC BLOOD PRESSURE: 154 MMHG

## 2021-01-16 VITALS — SYSTOLIC BLOOD PRESSURE: 137 MMHG | DIASTOLIC BLOOD PRESSURE: 93 MMHG

## 2021-01-16 VITALS — DIASTOLIC BLOOD PRESSURE: 90 MMHG | SYSTOLIC BLOOD PRESSURE: 166 MMHG

## 2021-01-16 VITALS — DIASTOLIC BLOOD PRESSURE: 94 MMHG | SYSTOLIC BLOOD PRESSURE: 171 MMHG

## 2021-01-16 VITALS — DIASTOLIC BLOOD PRESSURE: 104 MMHG | SYSTOLIC BLOOD PRESSURE: 183 MMHG

## 2021-01-16 VITALS — DIASTOLIC BLOOD PRESSURE: 89 MMHG | SYSTOLIC BLOOD PRESSURE: 152 MMHG

## 2021-01-16 VITALS — DIASTOLIC BLOOD PRESSURE: 87 MMHG | SYSTOLIC BLOOD PRESSURE: 132 MMHG

## 2021-01-16 VITALS — SYSTOLIC BLOOD PRESSURE: 170 MMHG | DIASTOLIC BLOOD PRESSURE: 101 MMHG

## 2021-01-16 VITALS — DIASTOLIC BLOOD PRESSURE: 113 MMHG | SYSTOLIC BLOOD PRESSURE: 148 MMHG

## 2021-01-16 VITALS — DIASTOLIC BLOOD PRESSURE: 99 MMHG | SYSTOLIC BLOOD PRESSURE: 153 MMHG

## 2021-01-16 VITALS — DIASTOLIC BLOOD PRESSURE: 91 MMHG | SYSTOLIC BLOOD PRESSURE: 151 MMHG

## 2021-01-16 VITALS — SYSTOLIC BLOOD PRESSURE: 145 MMHG | DIASTOLIC BLOOD PRESSURE: 90 MMHG

## 2021-01-16 LAB
BASOPHILS # BLD AUTO: 0 10^3/UL (ref 0–0.1)
BASOPHILS NFR BLD AUTO: 0 % (ref 0–10)
BUN/CREAT SERPL: 17
CALCIUM SERPL-MCNC: 8 MG/DL (ref 8.5–10.1)
CHLORIDE SERPL-SCNC: 103 MMOL/L (ref 98–107)
CO2 SERPL-SCNC: 29 MMOL/L (ref 21–32)
CREAT SERPL-MCNC: 0.69 MG/DL (ref 0.6–1.3)
EOSINOPHIL # BLD AUTO: 0.1 10^3/UL (ref 0–0.3)
EOSINOPHIL NFR BLD AUTO: 1 % (ref 0–10)
GFR SERPLBLD BASED ON 1.73 SQ M-ARVRAT: > 60 ML/MIN
GLUCOSE SERPL-MCNC: 170 MG/DL (ref 70–105)
HCT VFR BLD CALC: 39 % (ref 40–54)
HGB BLD-MCNC: 12.9 G/DL (ref 13.3–17.7)
LYMPHOCYTES # BLD AUTO: 1.6 10^3/UL (ref 1–4)
LYMPHOCYTES NFR BLD AUTO: 19 % (ref 12–44)
MAGNESIUM SERPL-MCNC: 1.8 MG/DL (ref 1.6–2.4)
MANUAL DIFFERENTIAL PERFORMED BLD QL: NO
MCH RBC QN AUTO: 28 PG (ref 25–34)
MCHC RBC AUTO-ENTMCNC: 34 G/DL (ref 32–36)
MCV RBC AUTO: 83 FL (ref 80–99)
MONOCYTES # BLD AUTO: 0.7 10^3/UL (ref 0–1)
MONOCYTES NFR BLD AUTO: 8 % (ref 0–12)
NEUTROPHILS # BLD AUTO: 5.9 10^3/UL (ref 1.8–7.8)
NEUTROPHILS NFR BLD AUTO: 69 % (ref 42–75)
PHOSPHATE SERPL-MCNC: 3.3 MG/DL (ref 2.3–4.7)
PLATELET # BLD: 355 10^3/UL (ref 130–400)
PMV BLD AUTO: 10.5 FL (ref 9–12.2)
POTASSIUM SERPL-SCNC: 3.1 MMOL/L (ref 3.6–5)
SODIUM SERPL-SCNC: 142 MMOL/L (ref 135–145)
WBC # BLD AUTO: 8.5 10^3/UL (ref 4.3–11)

## 2021-01-16 RX ADMIN — INSULIN ASPART SCH UNIT: 100 INJECTION, SOLUTION INTRAVENOUS; SUBCUTANEOUS at 11:28

## 2021-01-16 RX ADMIN — POTASSIUM CHLORIDE SCH MEQ: 1500 TABLET, EXTENDED RELEASE ORAL at 04:24

## 2021-01-16 RX ADMIN — ALBUTEROL SULFATE SCH PUFF: 90 AEROSOL, METERED RESPIRATORY (INHALATION) at 07:12

## 2021-01-16 RX ADMIN — POTASSIUM CHLORIDE SCH MLS/HR: 200 INJECTION, SOLUTION INTRAVENOUS at 04:23

## 2021-01-16 RX ADMIN — MAGNESIUM SULFATE IN DEXTROSE SCH MLS/HR: 10 INJECTION, SOLUTION INTRAVENOUS at 04:23

## 2021-01-16 RX ADMIN — INSULIN ASPART SCH UNIT: 100 INJECTION, SOLUTION INTRAVENOUS; SUBCUTANEOUS at 16:12

## 2021-01-16 RX ADMIN — SODIUM CHLORIDE, SODIUM LACTATE, POTASSIUM CHLORIDE, AND CALCIUM CHLORIDE SCH MLS/HR: 600; 310; 30; 20 INJECTION, SOLUTION INTRAVENOUS at 20:44

## 2021-01-16 RX ADMIN — METOPROLOL TARTRATE SCH MG: 25 TABLET, FILM COATED ORAL at 08:41

## 2021-01-16 RX ADMIN — ALBUTEROL SULFATE SCH PUFF: 90 AEROSOL, METERED RESPIRATORY (INHALATION) at 02:32

## 2021-01-16 RX ADMIN — RISPERIDONE SCH MG: 0.5 TABLET, FILM COATED ORAL at 08:41

## 2021-01-16 RX ADMIN — METOPROLOL TARTRATE SCH MG: 25 TABLET, FILM COATED ORAL at 20:33

## 2021-01-16 RX ADMIN — ENOXAPARIN SODIUM SCH MG: 100 INJECTION SUBCUTANEOUS at 08:40

## 2021-01-16 RX ADMIN — INSULIN ASPART SCH UNIT: 100 INJECTION, SOLUTION INTRAVENOUS; SUBCUTANEOUS at 07:17

## 2021-01-16 RX ADMIN — ENOXAPARIN SODIUM SCH MG: 100 INJECTION SUBCUTANEOUS at 20:33

## 2021-01-16 RX ADMIN — ALBUTEROL SULFATE SCH PUFF: 90 AEROSOL, METERED RESPIRATORY (INHALATION) at 10:31

## 2021-01-16 RX ADMIN — SODIUM CHLORIDE SCH MLS/HR: 900 INJECTION INTRAVENOUS at 20:33

## 2021-01-16 RX ADMIN — FAMOTIDINE SCH MG: 10 INJECTION INTRAVENOUS at 08:41

## 2021-01-16 RX ADMIN — INSULIN ASPART SCH UNIT: 100 INJECTION, SOLUTION INTRAVENOUS; SUBCUTANEOUS at 20:34

## 2021-01-16 RX ADMIN — ALBUTEROL SULFATE SCH PUFF: 90 AEROSOL, METERED RESPIRATORY (INHALATION) at 14:24

## 2021-01-16 RX ADMIN — RISPERIDONE SCH MG: 0.5 TABLET, FILM COATED ORAL at 20:33

## 2021-01-16 NOTE — NUR
Patient arrived to room via wheelchair. Report received from LARRY Clay. I agree with 
previous RN's assessment and will assume care at this time. Patient sitting up in bed eating 
dinner with all belongings and call light within reach. Will continue to monitor.

## 2021-01-16 NOTE — DIAGNOSTIC IMAGING REPORT
Clinical indications: Patient is COVID positive with shortness of

air. IC management.



Exam: Portable chest x-ray upright view.



Comparisons: Chest x-ray dated 01/15/2021.



Findings:



There is slight improved aeration of the left superior perihilar

region. Otherwise remainder of the bilateral patchy lung

infiltrates are stable. Is no pleural effusion or normal thorax.

Upper limits of normal heart size for portable projection.

Pulmonary vasculature is obscured. Right PICC line again seen in

stable position.



IMPRESSION:

There is slight improved aeration of the left superior perihilar

region. Otherwise stable diffuse bilateral lung infiltrates.



Dictated by: 



  Dictated on workstation # [id number]

## 2021-01-16 NOTE — PROGRESS NOTE - HOSPITALIST
LILIANE RODRIGUEZ, 21 1120:


Subjective


HPI/CC On Admission


Time Seen by Provider:  08:40





Subjective/Events-last exam


Mr. Watts was seen and examined by Dr. Baig in the ICU for acute hypoxic 

respiratory failure secondary to COVID19. Patient is lethargic today and 

dysarthric. Having increased oxygen requirements, now on vapotherm 85% FiO2. 

Patient is requesting BiPAP but currently stable on vapotherm.





Objective


Exam


Vital Signs





Vital Signs








  Date Time  Temp Pulse Resp B/P (MAP) Pulse Ox O2 Delivery O2 Flow Rate FiO2


 


21 10:31     91 Vapotherm 35.00 85


 


21 10:00  77  145/90 (108)    


 


21 09:11 36.5       


 


1/15/21 02:15   17     





Capillary Refill : Less Than 3 SecondsLess Than 3 Seconds


General Appearance:  Mild Distress


Respiratory:  Crackles (R lung)


Cardiovascular:  Regular Rate, Rhythm, Normal Peripheral Pulses


Gastrointestinal:  Normal Bowel Sounds, Soft


Neurologic/Psychiatric:  Motor Weakness





Results/Procedures


Lab


Laboratory Tests


21 03:30








Patient resulted labs reviewed.


Imaging:  Reviewed Imaging Films, Reviewed Imaging Report





Assessment/Plan


Assessment and Plan


Assess & Plan/Chief Complaint


Acute respiratory failure secondary to COVID19 infection


* Currently on vapotherm 85% FiO2, 35 flow rate, satting 95-98% 


   * Will monitor closely for decompensation


* CXR improved


* Patient does not qualify for remdesivir


* Decadron day 5


* Up to chair with meals to increase strength


* Will d/c Nuñez to help with mobility





Secondary pneumonia 


* Continue rocephin, day 5


* Completed course of azithromycin 


* Albuterol





Hypertension


* Will monitor closely


* Does not have a current diagnosis of hypertension prior to admission





Hypokalemia 


* Replace 





Agitation 


* Continue risperdol and PRN haldol


* PRN Morphine 





Diet: CHO 60g


GI/DVT ppx: famotidine, lovenox


FULL CODE


Critical Care:  Critically Ill Patient





Clinical Quality Measures


DVT/VTE Risk/Contraindication:


Risk Factor Score Per Nursin


RFS Level Per Nursing on Admit:  4+=Very High





BENITO BAIG MD 21 1335:


Subjective


HPI/CC On Admission


Date Seen by Provider:  2021





Supervisory-Addendum Brief


Verification & Attestation


Participated in pt care:  history, MDM, physical


Personally performed:  exam, history, MDM, supervision of care


Care discussed with:  Medical Student, other (Nurse)


Procedures:  n/a


Results interpretation:  Verified all documentation


Verification and Attestation of Medical Student E/M Service





A medical student performed and documented this service in my presence. I 

reviewed and verified all information documented by the medical student and made

modifications to such information, when appropriate. I personally performed the 

physical exam and medical decision making. 





 Benito Baig, 2021,13:35


  Patient is very marginal.  Potassium is slightly low today.  He seems more 

lethargic today than he had been and actually just wants to go home.  We 

discussed getting him up to a chair which he would like to do we also discussed 

discussed taking out his Nuñez catheter but he can almost not even sit up 

without full assistance so I fear that that would tax him anything more.  He 

remains precarious











LILIANE RODRIGUEZ,         2021 11:20


BENITO BAIG MD         2021 13:35

## 2021-01-17 VITALS — SYSTOLIC BLOOD PRESSURE: 157 MMHG | DIASTOLIC BLOOD PRESSURE: 90 MMHG

## 2021-01-17 VITALS — SYSTOLIC BLOOD PRESSURE: 152 MMHG | DIASTOLIC BLOOD PRESSURE: 84 MMHG

## 2021-01-17 VITALS — DIASTOLIC BLOOD PRESSURE: 89 MMHG | SYSTOLIC BLOOD PRESSURE: 156 MMHG

## 2021-01-17 VITALS — DIASTOLIC BLOOD PRESSURE: 106 MMHG | SYSTOLIC BLOOD PRESSURE: 183 MMHG

## 2021-01-17 VITALS — SYSTOLIC BLOOD PRESSURE: 149 MMHG | DIASTOLIC BLOOD PRESSURE: 91 MMHG

## 2021-01-17 VITALS — SYSTOLIC BLOOD PRESSURE: 145 MMHG | DIASTOLIC BLOOD PRESSURE: 86 MMHG

## 2021-01-17 LAB
BASOPHILS # BLD AUTO: 0 10^3/UL (ref 0–0.1)
BASOPHILS NFR BLD AUTO: 0 % (ref 0–10)
BUN/CREAT SERPL: 21
CALCIUM SERPL-MCNC: 8.2 MG/DL (ref 8.5–10.1)
CHLORIDE SERPL-SCNC: 103 MMOL/L (ref 98–107)
CO2 SERPL-SCNC: 30 MMOL/L (ref 21–32)
CREAT SERPL-MCNC: 0.68 MG/DL (ref 0.6–1.3)
EOSINOPHIL # BLD AUTO: 0.1 10^3/UL (ref 0–0.3)
EOSINOPHIL NFR BLD AUTO: 1 % (ref 0–10)
GFR SERPLBLD BASED ON 1.73 SQ M-ARVRAT: > 60 ML/MIN
GLUCOSE SERPL-MCNC: 147 MG/DL (ref 70–105)
HCT VFR BLD CALC: 40 % (ref 40–54)
HGB BLD-MCNC: 13.3 G/DL (ref 13.3–17.7)
LYMPHOCYTES # BLD AUTO: 1.9 10^3/UL (ref 1–4)
LYMPHOCYTES NFR BLD AUTO: 19 % (ref 12–44)
MAGNESIUM SERPL-MCNC: 1.9 MG/DL (ref 1.6–2.4)
MANUAL DIFFERENTIAL PERFORMED BLD QL: NO
MCH RBC QN AUTO: 28 PG (ref 25–34)
MCHC RBC AUTO-ENTMCNC: 33 G/DL (ref 32–36)
MCV RBC AUTO: 84 FL (ref 80–99)
MONOCYTES # BLD AUTO: 0.8 10^3/UL (ref 0–1)
MONOCYTES NFR BLD AUTO: 8 % (ref 0–12)
NEUTROPHILS # BLD AUTO: 6.8 10^3/UL (ref 1.8–7.8)
NEUTROPHILS NFR BLD AUTO: 69 % (ref 42–75)
PHOSPHATE SERPL-MCNC: 3.7 MG/DL (ref 2.3–4.7)
PLATELET # BLD: 379 10^3/UL (ref 130–400)
PMV BLD AUTO: 10.6 FL (ref 9–12.2)
POTASSIUM SERPL-SCNC: 3.5 MMOL/L (ref 3.6–5)
SODIUM SERPL-SCNC: 142 MMOL/L (ref 135–145)
WBC # BLD AUTO: 9.9 10^3/UL (ref 4.3–11)

## 2021-01-17 RX ADMIN — ENOXAPARIN SODIUM SCH MG: 100 INJECTION SUBCUTANEOUS at 20:37

## 2021-01-17 RX ADMIN — ALBUTEROL SULFATE SCH PUFF: 90 AEROSOL, METERED RESPIRATORY (INHALATION) at 10:14

## 2021-01-17 RX ADMIN — INSULIN ASPART SCH UNIT: 100 INJECTION, SOLUTION INTRAVENOUS; SUBCUTANEOUS at 16:31

## 2021-01-17 RX ADMIN — ALBUTEROL SULFATE SCH PUFF: 90 AEROSOL, METERED RESPIRATORY (INHALATION) at 14:18

## 2021-01-17 RX ADMIN — LABETALOL HYDROCHLORIDE PRN MG: 5 INJECTION, SOLUTION INTRAVENOUS at 16:31

## 2021-01-17 RX ADMIN — ALBUTEROL SULFATE SCH PUFF: 90 AEROSOL, METERED RESPIRATORY (INHALATION) at 07:16

## 2021-01-17 RX ADMIN — METOPROLOL TARTRATE SCH MG: 25 TABLET, FILM COATED ORAL at 20:36

## 2021-01-17 RX ADMIN — POTASSIUM CHLORIDE SCH MEQ: 1500 TABLET, EXTENDED RELEASE ORAL at 05:13

## 2021-01-17 RX ADMIN — FAMOTIDINE SCH MG: 10 INJECTION INTRAVENOUS at 10:34

## 2021-01-17 RX ADMIN — INSULIN ASPART SCH UNIT: 100 INJECTION, SOLUTION INTRAVENOUS; SUBCUTANEOUS at 10:35

## 2021-01-17 RX ADMIN — ALBUTEROL SULFATE SCH PUFF: 90 AEROSOL, METERED RESPIRATORY (INHALATION) at 18:25

## 2021-01-17 RX ADMIN — INSULIN ASPART SCH UNIT: 100 INJECTION, SOLUTION INTRAVENOUS; SUBCUTANEOUS at 20:37

## 2021-01-17 RX ADMIN — RISPERIDONE SCH MG: 0.5 TABLET, FILM COATED ORAL at 20:36

## 2021-01-17 RX ADMIN — INSULIN ASPART SCH UNIT: 100 INJECTION, SOLUTION INTRAVENOUS; SUBCUTANEOUS at 05:10

## 2021-01-17 RX ADMIN — RISPERIDONE SCH MG: 0.5 TABLET, FILM COATED ORAL at 10:35

## 2021-01-17 RX ADMIN — ENOXAPARIN SODIUM SCH MG: 100 INJECTION SUBCUTANEOUS at 10:34

## 2021-01-17 RX ADMIN — POTASSIUM CHLORIDE SCH MLS/HR: 200 INJECTION, SOLUTION INTRAVENOUS at 05:10

## 2021-01-17 RX ADMIN — METOPROLOL TARTRATE SCH MG: 25 TABLET, FILM COATED ORAL at 10:35

## 2021-01-17 RX ADMIN — MAGNESIUM SULFATE IN DEXTROSE SCH MLS/HR: 10 INJECTION, SOLUTION INTRAVENOUS at 05:10

## 2021-01-17 RX ADMIN — ALBUTEROL SULFATE SCH PUFF: 90 AEROSOL, METERED RESPIRATORY (INHALATION) at 21:19

## 2021-01-17 NOTE — PROGRESS NOTE - HOSPITALIST
Subjective


HPI/CC On Admission


Date Seen by Provider:  2021


Time Seen by Provider:  12:00





Subjective/Events-last exam


Patient doing well


Much improved


Weaning O2


DC cath





Review of Systems


General:  Fatigue, Malaise





Objective


Exam


Vital Signs





Vital Signs








  Date Time  Temp Pulse Resp B/P (MAP) Pulse Ox O2 Delivery O2 Flow Rate FiO2


 


21 19:36  111 18  94 High Flow N/C 4.00 


 


21 16:47    152/84 (106)    


 


21 15:47 36.1       


 


21 14:18        70





Capillary Refill : Less Than 3 SecondsLess Than 3 Seconds


General Appearance:  No Apparent Distress, WD/WN, Chronically ill


Respiratory:  Chest Non Tender, Lungs Clear, Normal Breath Sounds, No Accessory 

Muscle Use, No Respiratory Distress


Cardiovascular:  Regular Rate, Rhythm, No Edema, No Gallop, No JVD, No Murmur, 

Normal Peripheral Pulses


Neurologic/Psychiatric:  Alert, Oriented x3, No Motor/Sensory Deficits, 

Depressed Affect





Results/Procedures


Lab


Laboratory Tests


21 04:18








Patient resulted labs reviewed.


Imaging:  Reviewed Imaging Films, Reviewed Imaging Report





Assessment/Plan


Assessment and Plan


Assess & Plan/Chief Complaint


Assessment:


COVID-19 PNA


Hypoxia


Lactic acidosis


DM OOC


HTN


Obesity


Presumed SHYAM





Plan:


O2


ICU transfer


Control sugars


High risk for intubation





21:


Improved


Weaning O2


DC catheter


Monitor closely





Critical Care


Critically Ill Patient





Diagnosis/Problems


Diagnosis/Problems





(1) Acute respiratory failure due to COVID-19


Status:  Acute


(2) Pneumonia due to COVID-19 virus


Status:  Acute


(3) Uncontrolled diabetes mellitus


Status:  Acute


(4) Sepsis


(5) Dehydration


Status:  Acute


(6) Non-compliance


Status:  Acute


(7) Renal insufficiency


Status:  Acute





Clinical Quality Measures


DVT/VTE Risk/Contraindication:


Risk Factor Score Per Nursin


RFS Level Per Nursing on Admit:  4+=Very High











MARCO A HERNADEZ DO                2021 08:05

## 2021-01-18 VITALS — DIASTOLIC BLOOD PRESSURE: 102 MMHG | SYSTOLIC BLOOD PRESSURE: 179 MMHG

## 2021-01-18 VITALS — SYSTOLIC BLOOD PRESSURE: 130 MMHG | DIASTOLIC BLOOD PRESSURE: 60 MMHG

## 2021-01-18 VITALS — DIASTOLIC BLOOD PRESSURE: 105 MMHG | SYSTOLIC BLOOD PRESSURE: 179 MMHG

## 2021-01-18 LAB
BASOPHILS # BLD AUTO: 0 10^3/UL (ref 0–0.1)
BASOPHILS NFR BLD AUTO: 0 % (ref 0–10)
BUN/CREAT SERPL: 26
CALCIUM SERPL-MCNC: 8.3 MG/DL (ref 8.5–10.1)
CHLORIDE SERPL-SCNC: 106 MMOL/L (ref 98–107)
CO2 SERPL-SCNC: 29 MMOL/L (ref 21–32)
CREAT SERPL-MCNC: 0.68 MG/DL (ref 0.6–1.3)
EOSINOPHIL # BLD AUTO: 0.1 10^3/UL (ref 0–0.3)
EOSINOPHIL NFR BLD AUTO: 1 % (ref 0–10)
GFR SERPLBLD BASED ON 1.73 SQ M-ARVRAT: > 60 ML/MIN
GLUCOSE SERPL-MCNC: 142 MG/DL (ref 70–105)
HCT VFR BLD CALC: 40 % (ref 40–54)
HGB BLD-MCNC: 13.1 G/DL (ref 13.3–17.7)
LYMPHOCYTES # BLD AUTO: 1.9 10^3/UL (ref 1–4)
LYMPHOCYTES NFR BLD AUTO: 22 % (ref 12–44)
MAGNESIUM SERPL-MCNC: 2.1 MG/DL (ref 1.6–2.4)
MANUAL DIFFERENTIAL PERFORMED BLD QL: NO
MCH RBC QN AUTO: 28 PG (ref 25–34)
MCHC RBC AUTO-ENTMCNC: 33 G/DL (ref 32–36)
MCV RBC AUTO: 85 FL (ref 80–99)
MONOCYTES # BLD AUTO: 0.7 10^3/UL (ref 0–1)
MONOCYTES NFR BLD AUTO: 8 % (ref 0–12)
NEUTROPHILS # BLD AUTO: 5.8 10^3/UL (ref 1.8–7.8)
NEUTROPHILS NFR BLD AUTO: 66 % (ref 42–75)
PHOSPHATE SERPL-MCNC: 4.6 MG/DL (ref 2.3–4.7)
PLATELET # BLD: 350 10^3/UL (ref 130–400)
PMV BLD AUTO: 10.5 FL (ref 9–12.2)
POTASSIUM SERPL-SCNC: 3.5 MMOL/L (ref 3.6–5)
SODIUM SERPL-SCNC: 143 MMOL/L (ref 135–145)
WBC # BLD AUTO: 8.8 10^3/UL (ref 4.3–11)

## 2021-01-18 RX ADMIN — ALBUTEROL SULFATE SCH PUFF: 90 AEROSOL, METERED RESPIRATORY (INHALATION) at 10:55

## 2021-01-18 RX ADMIN — METOPROLOL TARTRATE SCH MG: 25 TABLET, FILM COATED ORAL at 21:01

## 2021-01-18 RX ADMIN — INSULIN ASPART SCH UNIT: 100 INJECTION, SOLUTION INTRAVENOUS; SUBCUTANEOUS at 11:00

## 2021-01-18 RX ADMIN — LISINOPRIL SCH MG: 20 TABLET ORAL at 21:01

## 2021-01-18 RX ADMIN — ALBUTEROL SULFATE SCH PUFF: 90 AEROSOL, METERED RESPIRATORY (INHALATION) at 02:26

## 2021-01-18 RX ADMIN — ALBUTEROL SULFATE SCH PUFF: 90 AEROSOL, METERED RESPIRATORY (INHALATION) at 22:16

## 2021-01-18 RX ADMIN — INSULIN ASPART SCH UNIT: 100 INJECTION, SOLUTION INTRAVENOUS; SUBCUTANEOUS at 18:19

## 2021-01-18 RX ADMIN — INSULIN ASPART SCH UNIT: 100 INJECTION, SOLUTION INTRAVENOUS; SUBCUTANEOUS at 05:50

## 2021-01-18 RX ADMIN — ALBUTEROL SULFATE SCH PUFF: 90 AEROSOL, METERED RESPIRATORY (INHALATION) at 06:26

## 2021-01-18 RX ADMIN — METOPROLOL TARTRATE SCH MG: 25 TABLET, FILM COATED ORAL at 09:52

## 2021-01-18 RX ADMIN — ALBUTEROL SULFATE SCH PUFF: 90 AEROSOL, METERED RESPIRATORY (INHALATION) at 15:04

## 2021-01-18 RX ADMIN — ENOXAPARIN SODIUM SCH MG: 100 INJECTION SUBCUTANEOUS at 21:01

## 2021-01-18 RX ADMIN — INSULIN ASPART SCH UNIT: 100 INJECTION, SOLUTION INTRAVENOUS; SUBCUTANEOUS at 21:02

## 2021-01-18 RX ADMIN — ENOXAPARIN SODIUM SCH MG: 100 INJECTION SUBCUTANEOUS at 09:53

## 2021-01-18 RX ADMIN — FAMOTIDINE SCH MG: 10 INJECTION INTRAVENOUS at 09:52

## 2021-01-18 RX ADMIN — POTASSIUM CHLORIDE SCH MEQ: 1500 TABLET, EXTENDED RELEASE ORAL at 05:55

## 2021-01-18 RX ADMIN — POTASSIUM CHLORIDE SCH MLS/HR: 200 INJECTION, SOLUTION INTRAVENOUS at 05:49

## 2021-01-18 RX ADMIN — ALBUTEROL SULFATE SCH PUFF: 90 AEROSOL, METERED RESPIRATORY (INHALATION) at 19:53

## 2021-01-18 RX ADMIN — LABETALOL HYDROCHLORIDE PRN MG: 5 INJECTION, SOLUTION INTRAVENOUS at 10:01

## 2021-01-18 RX ADMIN — MAGNESIUM SULFATE IN DEXTROSE SCH MLS/HR: 10 INJECTION, SOLUTION INTRAVENOUS at 05:54

## 2021-01-18 NOTE — PROGRESS NOTE
Subjective


Subjective/Events-last exam


Patient doing well this AM. Off oxygen. Has been up walking around the room. 

Appetite improving


Review of Systems


Pulmonary:  Dyspnea, Cough


Cardiovascular:  No: Chest Pain, Palpitations


Gastrointestinal:  No: Nausea, Vomiting, Abdominal Pain


Neurological:  No: Weakness, Incoordination





Objective


Exam


Last Set of Vital Signs





Vital Signs








  Date Time  Temp Pulse Resp B/P (MAP) Pulse Ox O2 Delivery O2 Flow Rate FiO2


 


21 10:55     97 Room Air 3.00 


 


21 07:37 36.0 99 20 179/105 (129)    


 


21 14:18        70





Capillary Refill : Less Than 3 SecondsLess Than 3 Seconds


I&O











Intake and Output 


 


 21





 00:00


 


Intake Total 1570 ml


 


Output Total 1525 ml


 


Balance 45 ml


 


 


 


Intake Oral 1560 ml


 


IV Total 10 ml


 


Output Urine Total 1525 ml


 


# Voids 3








General:  Alert, Oriented X3, Cooperative, No Acute Distress


HEENT:  Mucous Memb Moist/Pink


Lungs:  Clear to Auscultation, Normal Air Movement


Heart:  Regular Rate, No Murmurs


Abdomen:  Normal Bowel Sounds, Soft, No Tenderness, No Masses


Extremities:  No Edema, No Tenderness/Swelling


Neuro:  Normal Speech, Strength at 5/5 X4 Ext, Sensation Intact, Cranial Nerves 

3-12 NL


Psych/Mental Status:  Mental Status NL, Mood NL





Results/Procedures


Lab


Laboratory Tests


21 15:45: Glucometer 344H


21 19:36: Glucometer 323H


21 05:11: 


White Blood Count 8.8, Red Blood Count 4.75, Hemoglobin 13.1L, Hematocrit 40, 

Mean Corpuscular Volume 85, Mean Corpuscular Hemoglobin 28, Mean Corpuscular 

Hemoglobin Concent 33, Red Cell Distribution Width 13.0, Platelet Count 350, 

Mean Platelet Volume 10.5, Immature Granulocyte % (Auto) 3, Neutrophils (%) 

(Auto) 66, Lymphocytes (%) (Auto) 22, Monocytes (%) (Auto) 8, Eosinophils (%) 

(Auto) 1, Basophils (%) (Auto) 0, Neutrophils # (Auto) 5.8, Lymphocytes # (Auto)

1.9, Monocytes # (Auto) 0.7, Eosinophils # (Auto) 0.1, Basophils # (Auto) 0.0, 

Immature Granulocyte # (Auto) 0.3H, Sodium Level 143, Potassium Level 3.5L, 

Chloride Level 106, Carbon Dioxide Level 29, Anion Gap 8, Blood Urea Nitrogen 

18, Creatinine 0.68, Estimat Glomerular Filtration Rate > 60, BUN/Creatinine 

Ratio 26, Glucose Level 142H, Calcium Level 8.3L, Phosphorus Level 4.6, 

Magnesium Level 2.1


21 10:53: Glucometer 212H





Microbiology


21 MRSA Screen - Final, Complete


          MRSA not isolated


1/10/21 Urine Culture - Final, Complete


          NO GROWTH


1/10/21 Blood Culture - Final, Complete


          No growth





Assessment/Plan


Assessment/Plan


Assessment & Plan


51 yo M that was admitted for acute respiratory failure due to Covid, now 

improving and off oxygen





Acute Respiratory Failure: Resolved


Sepsis: Resolved


Covid-19


Hypoxia


Type 2 Diabetes: New Diagnosis


HTN


SANTANA: Resolved





Plan:


- Ambulatory oxygen study and continuous nocturnal oxygen


- Titration steroids


- Will start Metformin 500 mg BID, will likely need second medication 


- Start Lisinopril for HTN and renal protection


- Continue Lovenox


- Close f.u with PCP Jairo Brown





Clinical Quality Measures


DVT/VTE Risk/Contraindication:


Risk Factor Score Per Nursin


RFS Level Per Nursing on Admit:  4+=Very High











TASNEEM MOELLER MD               2021 12:38

## 2021-01-18 NOTE — NUR
RD ASSESSMENT 



PMHx: HTN; Crohn's disease; DM (hx of non-compliance); 



PT INTERACTION: Note pt is currently in COVID isolation per chart review. Note all diet 
information for follow-up is per Lynn JUAREZ or per chart review. Lynn states pt is eating 
good. Note avg PO intake 69% x4d, per chart review. Lynn states no issues with n/v/c/d 
that she is aware of. Note last BM was 1/18, and pt not currently on bowel regimen per chart 
review. 



Est. kcal needs: 3216-4237 kcal | 15-18 kcal/kg  

Est. Pro needs:  113-142 g Pro | 0.8-1.0 g Pro/kg 



PES STATEMENT: Inadequate oral intake (NI-2.1) related to loss of appetite, as evidenced by 
chart review, and avg PO intake 69% x4d. 





INTERVENTION:  

Continue with current diet order of Regular diet. Pt may benefit from consistent CHO diet 
restriction as pt has hx of DM. 

Will continue to follow and reassess as pt needs, intake, and status change. 





ALIZE PATTERSON, MS RD LD 

[phone] cell

## 2021-01-18 NOTE — NUR
PT DID NOT DROP BELOW 92% DURING WALK STUDY ON ROOM AIR. 

-------------------------------------------------------------------------------

Addendum: 01/18/21 at 1329 by NATASHA GERMAN RT

-------------------------------------------------------------------------------

Amended: Links added.

## 2021-01-19 VITALS — SYSTOLIC BLOOD PRESSURE: 166 MMHG | DIASTOLIC BLOOD PRESSURE: 94 MMHG

## 2021-01-19 VITALS — DIASTOLIC BLOOD PRESSURE: 110 MMHG | SYSTOLIC BLOOD PRESSURE: 178 MMHG

## 2021-01-19 LAB
BASOPHILS # BLD AUTO: 0 10^3/UL (ref 0–0.1)
BASOPHILS NFR BLD AUTO: 0 % (ref 0–10)
BUN/CREAT SERPL: 24
CALCIUM SERPL-MCNC: 8.5 MG/DL (ref 8.5–10.1)
CHLORIDE SERPL-SCNC: 106 MMOL/L (ref 98–107)
CO2 SERPL-SCNC: 29 MMOL/L (ref 21–32)
CREAT SERPL-MCNC: 0.71 MG/DL (ref 0.6–1.3)
EOSINOPHIL # BLD AUTO: 0.1 10^3/UL (ref 0–0.3)
EOSINOPHIL NFR BLD AUTO: 1 % (ref 0–10)
GFR SERPLBLD BASED ON 1.73 SQ M-ARVRAT: > 60 ML/MIN
GLUCOSE SERPL-MCNC: 124 MG/DL (ref 70–105)
HCT VFR BLD CALC: 41 % (ref 40–54)
HGB BLD-MCNC: 13.5 G/DL (ref 13.3–17.7)
LYMPHOCYTES # BLD AUTO: 2.4 10^3/UL (ref 1–4)
LYMPHOCYTES NFR BLD AUTO: 27 % (ref 12–44)
MAGNESIUM SERPL-MCNC: 2.1 MG/DL (ref 1.6–2.4)
MANUAL DIFFERENTIAL PERFORMED BLD QL: NO
MCH RBC QN AUTO: 28 PG (ref 25–34)
MCHC RBC AUTO-ENTMCNC: 33 G/DL (ref 32–36)
MCV RBC AUTO: 85 FL (ref 80–99)
MONOCYTES # BLD AUTO: 0.8 10^3/UL (ref 0–1)
MONOCYTES NFR BLD AUTO: 9 % (ref 0–12)
NEUTROPHILS # BLD AUTO: 5.2 10^3/UL (ref 1.8–7.8)
NEUTROPHILS NFR BLD AUTO: 60 % (ref 42–75)
PHOSPHATE SERPL-MCNC: 4.6 MG/DL (ref 2.3–4.7)
PLATELET # BLD: 373 10^3/UL (ref 130–400)
PMV BLD AUTO: 10.9 FL (ref 9–12.2)
POTASSIUM SERPL-SCNC: 3.9 MMOL/L (ref 3.6–5)
SODIUM SERPL-SCNC: 143 MMOL/L (ref 135–145)
WBC # BLD AUTO: 8.6 10^3/UL (ref 4.3–11)

## 2021-01-19 RX ADMIN — LISINOPRIL SCH MG: 20 TABLET ORAL at 08:36

## 2021-01-19 RX ADMIN — ALBUTEROL SULFATE SCH PUFF: 90 AEROSOL, METERED RESPIRATORY (INHALATION) at 02:12

## 2021-01-19 RX ADMIN — ALBUTEROL SULFATE SCH PUFF: 90 AEROSOL, METERED RESPIRATORY (INHALATION) at 11:04

## 2021-01-19 RX ADMIN — ENOXAPARIN SODIUM SCH MG: 100 INJECTION SUBCUTANEOUS at 08:36

## 2021-01-19 RX ADMIN — INSULIN ASPART SCH UNIT: 100 INJECTION, SOLUTION INTRAVENOUS; SUBCUTANEOUS at 05:21

## 2021-01-19 RX ADMIN — POTASSIUM CHLORIDE SCH MEQ: 1500 TABLET, EXTENDED RELEASE ORAL at 06:38

## 2021-01-19 RX ADMIN — ALBUTEROL SULFATE SCH PUFF: 90 AEROSOL, METERED RESPIRATORY (INHALATION) at 06:17

## 2021-01-19 RX ADMIN — METOPROLOL TARTRATE SCH MG: 25 TABLET, FILM COATED ORAL at 08:36

## 2021-01-19 NOTE — DISCHARGE SUMMARY
Discharge [facility]


Reconcile Patient Problems


Problems Reviewed?:  Yes





Discharge Medications


New, Converted or Re-Newed RX:  Transmitted to Pharmacy


New Medications:  


Metformin HCl (Metformin HCl ER) 500 Mg Tab.er.24


500 MG PO DAILY, #30 TAB





Prednisone (Prednisone) 20 Mg Tab


20 MG PO DAILY, #20 TAB


Take 3 tabs(60mg)daily x 3 days then 2 tabs x 3 days then 1 tab x 3


 days, then 1/2 tab daily x 4 days


Lisinopril (Lisinopril) 20 Mg Tablet


20 MG PO DAILY, #30 TAB





Metoprolol Tartrate (Metoprolol Tartrate) 25 Mg Tablet


25 MG PO BID, #60 TAB





 


Discontinued Medications:  


Amoxicillin (Amoxicillin) 500 Mg Capsule


500 MG PO BID, CAP


FILLED 01- #20/10 DAY SUPPLY


Meclizine HCl (Meclizine HCl) 25 Mg Tablet


25 MG PO Q6H PRN for DIZZINESS, TAB











Patient Instructions


Goal/Follow Up Appt:  


F.u with Jairo Brown on Wednesday, Jan 27th @ 1140





Activity & Diet


Discharge Diet:  ADA Diet


Activity as Tolerated:  Yes





Orders-Post D/C & Referrals


Pneu Vac Indicated:  Yes





Copy


Copies To 1:   Jairo MONTES HOLLY R MD               Jan 19, 2021 11:20

## 2021-05-20 ENCOUNTER — HOSPITAL ENCOUNTER (OUTPATIENT)
Dept: HOSPITAL 75 - RAD | Age: 51
End: 2021-05-20
Attending: PHYSICIAN ASSISTANT
Payer: SELF-PAY

## 2021-05-20 DIAGNOSIS — R47.81: Primary | ICD-10-CM

## 2021-05-20 DIAGNOSIS — R26.9: ICD-10-CM

## 2021-05-20 PROCEDURE — 70450 CT HEAD/BRAIN W/O DYE: CPT

## 2021-05-20 NOTE — DIAGNOSTIC IMAGING REPORT
PROCEDURE: CT head without contrast.



TECHNIQUE: Multiple contiguous axial images were obtained through

the brain without the use of intravenous contrast. Auto Exposure

Controls were utilized during the CT exam to meet ALARA standards

for radiation dose reduction. 



INDICATION: Slurred speech.



Correlation is made with prior head CT from 01/25/2013.



Ventricular size and sulcal pattern appears stable. Patient has

several lacunar infarcts have occurred since prior CT from 2013

in the left basal ganglia in the region of the left caudate head

as well as internal capsule on the left side. No sulcal

effacement or midline shift is identified. No acute intra-axial

or extra-axial hemorrhage is detected. Cisterns are patent.

Visualized paranasal sinuses are clear.



IMPRESSION: Chronic changes. No acute intracranial process is

detected.



Dictated by: 



  Dictated on workstation # [id number]

## 2021-06-16 ENCOUNTER — HOSPITAL ENCOUNTER (EMERGENCY)
Dept: HOSPITAL 75 - ER | Age: 51
Discharge: HOME | End: 2021-06-16
Payer: SELF-PAY

## 2021-06-16 VITALS — WEIGHT: 280.43 LBS | BODY MASS INDEX: 41.53 KG/M2 | HEIGHT: 68.9 IN

## 2021-06-16 VITALS — DIASTOLIC BLOOD PRESSURE: 102 MMHG | SYSTOLIC BLOOD PRESSURE: 183 MMHG

## 2021-06-16 DIAGNOSIS — E11.65: ICD-10-CM

## 2021-06-16 DIAGNOSIS — E86.0: ICD-10-CM

## 2021-06-16 DIAGNOSIS — I10: ICD-10-CM

## 2021-06-16 DIAGNOSIS — G45.9: Primary | ICD-10-CM

## 2021-06-16 DIAGNOSIS — Z79.899: ICD-10-CM

## 2021-06-16 DIAGNOSIS — Z79.84: ICD-10-CM

## 2021-06-16 LAB
ALBUMIN SERPL-MCNC: 3.9 GM/DL (ref 3.2–4.5)
ALP SERPL-CCNC: 78 U/L (ref 40–136)
ALT SERPL-CCNC: 10 U/L (ref 0–55)
AMORPH SED URNS QL MICRO: (no result) /LPF
APTT BLD: 20 SEC (ref 24–35)
APTT PPP: YELLOW S
BACTERIA #/AREA URNS HPF: NEGATIVE /HPF
BASOPHILS # BLD AUTO: 0.1 10^3/UL (ref 0–0.1)
BASOPHILS NFR BLD AUTO: 1 % (ref 0–10)
BILIRUB SERPL-MCNC: 1.1 MG/DL (ref 0.1–1)
BILIRUB UR QL STRIP: NEGATIVE
BUN/CREAT SERPL: 6
CALCIUM SERPL-MCNC: 9.1 MG/DL (ref 8.5–10.1)
CHLORIDE SERPL-SCNC: 102 MMOL/L (ref 98–107)
CO2 SERPL-SCNC: 25 MMOL/L (ref 21–32)
CREAT SERPL-MCNC: 0.94 MG/DL (ref 0.6–1.3)
D DIMER PPP FEU-MCNC: <= 0.27 UG/ML (ref 0–0.49)
EOSINOPHIL # BLD AUTO: 0.2 10^3/UL (ref 0–0.3)
EOSINOPHIL NFR BLD AUTO: 2 % (ref 0–10)
FIBRINOGEN PPP-MCNC: CLEAR MG/DL
GFR SERPLBLD BASED ON 1.73 SQ M-ARVRAT: > 60 ML/MIN
GLUCOSE SERPL-MCNC: 211 MG/DL (ref 70–105)
GLUCOSE UR STRIP-MCNC: NEGATIVE MG/DL
HCT VFR BLD CALC: 43 % (ref 40–54)
HGB BLD-MCNC: 14.9 G/DL (ref 13.3–17.7)
HYALINE CASTS #/AREA URNS LPF: (no result) /LPF
INR PPP: 1.1 (ref 0.8–1.4)
KETONES UR QL STRIP: NEGATIVE
LEUKOCYTE ESTERASE UR QL STRIP: NEGATIVE
LYMPHOCYTES # BLD AUTO: 2.4 10^3/UL (ref 1–4)
LYMPHOCYTES NFR BLD AUTO: 23 % (ref 12–44)
MANUAL DIFFERENTIAL PERFORMED BLD QL: NO
MCH RBC QN AUTO: 28 PG (ref 25–34)
MCHC RBC AUTO-ENTMCNC: 34 G/DL (ref 32–36)
MCV RBC AUTO: 82 FL (ref 80–99)
MONOCYTES # BLD AUTO: 0.8 10^3/UL (ref 0–1)
MONOCYTES NFR BLD AUTO: 7 % (ref 0–12)
NEUTROPHILS # BLD AUTO: 7 10^3/UL (ref 1.8–7.8)
NEUTROPHILS NFR BLD AUTO: 67 % (ref 42–75)
NITRITE UR QL STRIP: NEGATIVE
PH UR STRIP: 7 [PH] (ref 5–9)
PLATELET # BLD: 300 10^3/UL (ref 130–400)
PMV BLD AUTO: 9.8 FL (ref 9–12.2)
POTASSIUM SERPL-SCNC: 3.1 MMOL/L (ref 3.6–5)
PROT SERPL-MCNC: 7.2 GM/DL (ref 6.4–8.2)
PROT UR QL STRIP: NEGATIVE
PROTHROMBIN TIME: 14.4 SEC (ref 12.2–14.7)
RBC #/AREA URNS HPF: (no result) /HPF
SODIUM SERPL-SCNC: 141 MMOL/L (ref 135–145)
SP GR UR STRIP: 1.01 (ref 1.02–1.02)
WBC # BLD AUTO: 10.3 10^3/UL (ref 4.3–11)
WBC #/AREA URNS HPF: (no result) /HPF

## 2021-06-16 PROCEDURE — 85379 FIBRIN DEGRADATION QUANT: CPT

## 2021-06-16 PROCEDURE — 80053 COMPREHEN METABOLIC PANEL: CPT

## 2021-06-16 PROCEDURE — 84484 ASSAY OF TROPONIN QUANT: CPT

## 2021-06-16 PROCEDURE — 71045 X-RAY EXAM CHEST 1 VIEW: CPT

## 2021-06-16 PROCEDURE — 85730 THROMBOPLASTIN TIME PARTIAL: CPT

## 2021-06-16 PROCEDURE — 85610 PROTHROMBIN TIME: CPT

## 2021-06-16 PROCEDURE — 70450 CT HEAD/BRAIN W/O DYE: CPT

## 2021-06-16 PROCEDURE — 93005 ELECTROCARDIOGRAM TRACING: CPT

## 2021-06-16 PROCEDURE — 36415 COLL VENOUS BLD VENIPUNCTURE: CPT

## 2021-06-16 PROCEDURE — 85025 COMPLETE CBC W/AUTO DIFF WBC: CPT

## 2021-06-16 PROCEDURE — 81000 URINALYSIS NONAUTO W/SCOPE: CPT

## 2021-06-16 PROCEDURE — 93041 RHYTHM ECG TRACING: CPT

## 2021-06-16 NOTE — DIAGNOSTIC IMAGING REPORT
PROCEDURE: 

CT head wo r/o stroke.



TECHNIQUE: 

Multiple contiguous axial images were obtained through the brain

without the use of intravenous contrast. Auto Exposure Controls

were utilized during the CT exam to meet ALARA standards for

radiation dose reduction. 



INDICATION: 

Weakness and possible stroke.



COMPARISON:     

05/20/2021.



FINDINGS:

The ventricular size is stable. 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

basal ganglia, right basal ganglia, and adjacent to the frontal

horns bilaterally appear stable. There is no sulcal effacement.

There is no midline shift. No acute intra-axial or extra-axial

hemorrhage is detected. The cisterns are patent. The visualized

paranasal sinuses are clear.



IMPRESSION: 

Stable chronic changes since the examination of 1 month earlier.

No acute intracranial process is detected.



Dictated by: 



  Dictated on workstation # [id number]

## 2021-06-16 NOTE — ED NEUROLOGICAL PROBLEM
General


Chief Complaint:  Neurological Problems


Stated Complaint:  WEAKNESS,POSSIBLE TIA


Source:  patient, EMS


Exam Limitations:  no limitations





History of Present Illness


Date Seen by Provider:  2021


Time Seen by Provider:  08:35


Initial Comments


Here with report of global weakness.  Patient apparently had COVID-19 infection 

in February and has had prolonged weakness afterwards.  He apparently had a mini

stroke about 3 weeks ago.  He was recovering from that and today was taking a 

shower when he became significantly weak and had difficulty with speech.  Did 

not seem to be left-sided or right-sided.  He was able to get his wife's 

attention who called EMS.  They noted that he had significant weakness and was n

ot speaking very much.  This is subsequently improved and he is more near 

baseline on arrival to the ED.  He remembers the details.  He is alert and 

oriented and follows simple commands and answers questions appropriately.  

Denies any pain.  Denies being on any blood thinners.  Was well at 7 AM and 

symptoms onset at 0750.


Timing/Duration:  1/2 hour


Severity:  moderate


Associated Symptoms:  No fever/chills, No nausea/vomiting; trouble walking, we

akness





Allergies and Home Medications


Allergies


Coded Allergies:  


     No Known Drug Allergies (Unverified , 13)





Home Medications


Lisinopril 20 Mg Tablet, 20 MG PO DAILY


   Prescribed by: TASNEEM MOELLER on 21


Metformin HCl 500 Mg Tab.er.24, 500 MG PO DAILY


   Prescribed by: TASNEEM MOELLER on 21


Metoprolol Tartrate 25 Mg Tablet, 25 MG PO BID


   Prescribed by: TASNEEM MOELLER on 21


Prednisone 20 Mg Tab, 20 MG PO DAILY


   Take 3 tabs(60mg)daily x 3 days then 2 tabs x 3 days then 1 tab x 3 days, 

then 1/2 tab daily x 4 days 


   Prescribed by: TASNEEM MOELLER on 21 111





Patient Home Medication List


Home Medication List Reviewed:  Yes





Review of Systems


Review of Systems


Constitutional:  see HPI; No chills, No fever


Eyes:  Blurred Vision; Denies Pain


Ears, Nose, Mouth, Throat:  denies nose pain, denies throat pain


Respiratory:  No cough


Cardiovascular:  No chest pain, No edema


Gastrointestinal:  No abdominal pain, No nausea, No vomiting


Genitourinary:  no symptoms reported


Musculoskeletal:  no symptoms reported


Skin:  no symptoms reported


Psychiatric/Neurological:  See HPI





All Other Systems Reviewed


Negative Unless Noted:  Yes





Past Medical-Social-Family Hx


Past Med/Social Hx:  Reviewed Nursing Past Med/Soc Hx


Patient Social History


Alcohol Use:  Denies Use


Smoking Status:  Never a Smoker


2nd Hand Smoke Exposure:  No


Recent Hopitalizations:  No





Seasonal Allergies


Seasonal Allergies:  No





Past Medical History


Surgeries:  Yes


Abdominal, Appendectomy, Bowel Surgery, Gallbladder


Respiratory:  No


Cardiac:  Yes


Hypertension


Neurological:  No


Reproductive Disorders:  No


Sexually Transmitted Disease:  No


HIV/AIDS:  No


Genitourinary:  No


Gastrointestinal:  Yes (APPY;JUAN; PARTIAL COLECTOMY)


Crohns Disease, Gall Bladder Disease


Musculoskeletal:  No


Endocrine:  Yes (NON-COMPLIANT-DOES NOT TAKE MEDS, CHECK GLUCOSE OR FOLLOW DIET)


Diabetes, Non-Insulin dep


HEENT:  Yes (2020--PERTONSILLAR ABSCESS/TRANSFERRED TO [place].NO SURGERY )


Cancer:  No


Psychosocial:  No


Integumentary:  No


Blood Disorders:  No


Adverse Reaction/Blood Tranf:  No





Family Medical History


Reviewed Nursing Family Hx





Physical Exam


Vital Signs





Vital Signs - First Documented








 21





 08:29


 


Temp 35.3


 


Pulse 91


 


Resp 18


 


B/P (MAP) 149/99 (116)


 


Pulse Ox 94


 


O2 Delivery Room Air





Capillary Refill :


Height, Weight, BMI


Height: '"


Weight: lbs. oz. kg; 35.28 BMI


Method:


General Appearance:  WD/WN, no apparent distress


Neck:  full range of motion, supple





Progress/Results/Core Measures


Results/Orders


Lab Results





Laboratory Tests








Test


 21


08:34 21


10:33 Range/Units


 


 


White Blood Count


 10.3 


 


 4.3-11.0


10^3/uL


 


Red Blood Count


 5.26 


 


 4.30-5.52


10^6/uL


 


Hemoglobin 14.9   13.3-17.7  g/dL


 


Hematocrit 43   40-54  %


 


Mean Corpuscular Volume 82   80-99  fL


 


Mean Corpuscular Hemoglobin 28   25-34  pg


 


Mean Corpuscular Hemoglobin


Concent 34 


 


 32-36  g/dL





 


Red Cell Distribution Width 12.9   10.0-14.5  %


 


Platelet Count


 300 


 


 130-400


10^3/uL


 


Mean Platelet Volume 9.8   9.0-12.2  fL


 


Immature Granulocyte % (Auto) 0    %


 


Neutrophils (%) (Auto) 67   42-75  %


 


Lymphocytes (%) (Auto) 23   12-44  %


 


Monocytes (%) (Auto) 7   0-12  %


 


Eosinophils (%) (Auto) 2   0-10  %


 


Basophils (%) (Auto) 1   0-10  %


 


Neutrophils # (Auto)


 7.0 


 


 1.8-7.8


10^3/uL


 


Lymphocytes # (Auto)


 2.4 


 


 1.0-4.0


10^3/uL


 


Monocytes # (Auto)


 0.8 


 


 0.0-1.0


10^3/uL


 


Eosinophils # (Auto)


 0.2 


 


 0.0-0.3


10^3/uL


 


Basophils # (Auto)


 0.1 


 


 0.0-0.1


10^3/uL


 


Immature Granulocyte # (Auto)


 0.0 


 


 0.0-0.1


10^3/uL


 


Prothrombin Time 14.4   12.2-14.7  SEC


 


INR Comment 1.1   0.8-1.4  


 


Activated Partial


Thromboplast Time 20 L


 


 24-35  SEC





 


D-Dimer


 <= 0.27 


 


 0.00-0.49


UG/ML


 


Sodium Level 141   135-145  MMOL/L


 


Potassium Level 3.1 L  3.6-5.0  MMOL/L


 


Chloride Level 102     MMOL/L


 


Carbon Dioxide Level 25   21-32  MMOL/L


 


Anion Gap 14   5-14  MMOL/L


 


Blood Urea Nitrogen 6 L  7-18  MG/DL


 


Creatinine


 0.94 


 


 0.60-1.30


MG/DL


 


Estimat Glomerular Filtration


Rate > 60 


 


  





 


BUN/Creatinine Ratio 6    


 


Glucose Level 211 H    MG/DL


 


Calcium Level 9.1   8.5-10.1  MG/DL


 


Corrected Calcium 9.2   8.5-10.1  MG/DL


 


Total Bilirubin 1.1 H  0.1-1.0  MG/DL


 


Aspartate Amino Transf


(AST/SGOT) 10 


 


 5-34  U/L





 


Alanine Aminotransferase


(ALT/SGPT) 10 


 


 0-55  U/L





 


Alkaline Phosphatase 78     U/L


 


Troponin I < 0.028   <0.028  NG/ML


 


Total Protein 7.2   6.4-8.2  GM/DL


 


Albumin 3.9   3.2-4.5  GM/DL


 


Urine Color  YELLOW   


 


Urine Clarity  CLEAR   


 


Urine pH  7.0  5-9  


 


Urine Specific Gravity  1.015 L 1.016-1.022  


 


Urine Protein  NEGATIVE  NEGATIVE  


 


Urine Glucose (UA)  NEGATIVE  NEGATIVE  


 


Urine Ketones  NEGATIVE  NEGATIVE  


 


Urine Nitrite  NEGATIVE  NEGATIVE  


 


Urine Bilirubin  NEGATIVE  NEGATIVE  


 


Urine Urobilinogen  0.2  < = 1.0  MG/DL


 


Urine Leukocyte Esterase  NEGATIVE  NEGATIVE  


 


Urine RBC (Auto)  NEGATIVE  NEGATIVE  


 


Urine RBC  NONE   /HPF


 


Urine WBC  0-2   /HPF


 


Urine Crystals  PRESENT H  /LPF


 


Urine Amorphous Sediment


 


 FEW ZHANNA


URATES H  /LPF





 


Urine Bacteria  NEGATIVE   /HPF


 


Urine Casts  PRESENT   /LPF


 


Urine Hyaline Casts  0-2 H  /LPF


 


Urine Mucus  SMALL H  /LPF


 


Urine Culture Indicated  NO   








My Orders





Orders - HOUSTON HSIEH MD


Cbc With Automated Diff (21 08:39)


Protime With Inr (21 08:39)


Partial Thromboplastin Time (21 08:39)


Comprehensive Metabolic Panel (21 08:39)


Fibrin Degradation Products (21 08:39)


Troponin I (21 08:39)


Ua Culture If Indicated (21 08:39)


Chest 1 View, Ap/Pa Only (21 08:39)


Ekg Tracing (21 08:39)


Nothing By Mouth (21 Breakfast)


Accucheck Stat ONCE (21 08:39)


Ed Iv/Invasive Line Start (21 08:39)


Vital Signs Stroke Patient Q15M (21 08:39)


Ct Head Wo-R/O Stroke (21 08:39)


O2 (21 08:39)


Intake & Output 06,14,22 (21 08:39)


Monitor-Rhythm Ecg Trace Only (21 08:39)


Dysphagia Screening Tool (21 08:39)


Lipid Panel (21 06:00)


Ns Iv 1000 Ml (Sodium Chloride 0.9%) (21 08:45)





Medications Given in ED





Current Medications








 Medications  Dose


 Ordered  Sig/Iris


 Route  Start Time


 Stop Time Status Last Admin


Dose Admin


 


 Sodium Chloride  1,000 ml @ 


 0 mls/hr  Q0M ONCE


 IV  21 08:45


 21 08:46 DC 21 09:45


1,000 MLS/HR








Vital Signs/I&O











 21





 08:29


 


Temp 35.3


 


Pulse 91


 


Resp 18


 


B/P (MAP) 149/99 (116)


 


Pulse Ox 94


 


O2 Delivery Room Air











Progress


Progress Note :  


Progress Note


Seen and evaluated. Stroke screen initiated. Normal saline 1 L bolus ordered. 

Monitor patient. 1125: Patient has resolved symptoms and has improved after 

fluid administration. Wife at bedside and agrees that patient is at baseline. No

new acute findings. Safe for discharge home at this point. I did discuss the 

case with Dr. Ingram. She will assist with setting up appointment at clinic this 

week for recheck and further evaluation. Patient is not on aspirin so we will 

initiate full dose aspirin today. Wife states that she will give that to him as 

soon as she gets home. He is currently not on anything for hyperglycemia as 

well. They are working through different options at the clinic and I will defer 

to the clinic for that as well as consideration for statin due to history of 

stroke. This was discussed with Dr. Ingram as well. I will send a copy of the 

chart to the clinic. Discharged home with return precautions. Patient and family

verbalized understanding of instructions and agreement with plan.


Initial ECG Impression Date:  2021


Initial ECG Impression Time:  08:49


Initial ECG Rate:  84


Initial ECG Rhythm:  Normal Sinus


Comment


Sinus rhythm with left ventricular hypertrophy. Normal axis. No evidence of ST 

elevation MI. Interpreted by me. Similar to previous of 1/10/2021 but rate is 

slower.





Diagnostic Imaging





   Diagonstic Imaging:  CT


   Plain Films/CT/US/NM/MRI:  head


Comments


                 [facility] [place]





NAME:   MURRAY PICKETT


MED REC#:   [id number]


ACCOUNT#:   [id number]


PT STATUS:   REG ER


:   1970


PHYSICIAN:   HOUSTON HSIEH MD


ADMIT DATE:   21/ER


                                   ***Draft***


Date of Exam:21





CT HEAD WO-R/O STROKE








PROCEDURE: 


CT head wo r/o stroke.





TECHNIQUE: 


Multiple contiguous axial images were obtained through the brain


without the use of intravenous contrast. Auto Exposure Controls


were utilized during the CT exam to meet ALARA standards for


radiation dose reduction. 





INDICATION: 


Weakness and possible stroke.





COMPARISON:     


2021.





FINDINGS:


The ventricular size is stable. 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


basal ganglia, right basal ganglia, and adjacent to the frontal


horns bilaterally appear stable. There is no sulcal effacement.


There is no midline shift. No acute intra-axial or extra-axial


hemorrhage is detected. The cisterns are patent. The visualized


paranasal sinuses are clear.





IMPRESSION: 


Stable chronic changes since the examination of 1 month earlier.


No acute intracranial process is detected.





  Dictated on workstation # [id number]








Dict:   21 0906


Trans:   21 0909


 [id number]





Interpreted by:     MASOOD HUI MD


Electronically signed by:








   Diagonstic Imaging:  Xray


   Plain Films/CT/US/NM/MRI:  chest


Comments


                 [facility], [place]





NAME:   MURRAY PICKETT


MED REC#:   [id number]


ACCOUNT#:   [id number]


PT STATUS:   REG ER


:   1970


PHYSICIAN:   HOUSTON HSIEH MD


ADMIT DATE:   21/ER


                                   ***Draft***


Date of Exam:21





CHEST 1 VIEW, AP/PA ONLY








INDICATION: Weakness.





Time of exam 9:59 AM





Correlation is made with prior chest from 2021.





The heart size is normal. The pulmonary vascularity is


unremarkable. The lungs are clear. No infiltrate, effusion or


pneumothorax is detected.





Impression: No acute cardiopulmonary process is detected.





  Dictated on workstation # [id number]








Dict:   21 1012


Trans:   21 1015


[facility] [id number]





Interpreted by:     MASOOD HUI MD


Electronically signed by:





Departure


Impression





   Primary Impression:  


   Transient cerebral ischemia


   Qualified Codes:  G45.9 - Transient cerebral ischemic attack, unspecified


   Additional Impression:  


   Dehydration


Disposition:  01 HOME, SELF-CARE


Condition:  Improved





Departure-Patient Inst.


Decision time for Depature:  11:41


Referrals:  


[facility] (PCP/Family)


Primary Care Physician


Patient Instructions:  Dehydration, Adult (DC), Transient Ischemic Attack (DC)





Add. Discharge Instructions:  








All discharge instructions reviewed with patient and/or family. Voiced 

understanding.





Drink an adequate amount of fluids and eat a normal diet. Avoid sugars or salt 

in your diet. Follow-up with the clinic this week for recheck and further 

evaluation. If you have not heard from the call center by this afternoon, call 

them for your appointment. Your case was discussed with Dr. Ingram who was 

assisting with the appointment. You should initiate aspirin 325 mg 1 tablet 

daily of the enteric-coated aspirin and continue this until instructed different

by your doctor. Return for worse pain, fever, vomiting, weakness, breathing 

problems, slurred speech, vision problems or other concerns as needed.





Copy


Copies To 1:   LAURA INGRAM MD, TIMOTHY D MD          2021 09:09

## 2021-06-16 NOTE — DIAGNOSTIC IMAGING REPORT
INDICATION: Weakness.



Time of exam 9:59 AM



Correlation is made with prior chest from 01/16/2021.



The heart size is normal. The pulmonary vascularity is

unremarkable. The lungs are clear. No infiltrate, effusion or

pneumothorax is detected.



Impression: No acute cardiopulmonary process is detected.



Dictated by: 



  Dictated on workstation # [id number]

## 2021-12-04 ENCOUNTER — HOSPITAL ENCOUNTER (EMERGENCY)
Dept: HOSPITAL 75 - ER | Age: 51
Discharge: HOME | End: 2021-12-04
Payer: SELF-PAY

## 2021-12-04 VITALS — BODY MASS INDEX: 41.47 KG/M2 | WEIGHT: 279.99 LBS | HEIGHT: 68.9 IN

## 2021-12-04 VITALS — SYSTOLIC BLOOD PRESSURE: 128 MMHG | DIASTOLIC BLOOD PRESSURE: 69 MMHG

## 2021-12-04 DIAGNOSIS — Z91.14: ICD-10-CM

## 2021-12-04 DIAGNOSIS — Z86.16: ICD-10-CM
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
## 2021-12-04 DIAGNOSIS — Z79.899: ICD-10-CM

## 2021-12-04 DIAGNOSIS — Z90.49: ICD-10-CM

## 2021-12-04 DIAGNOSIS — Z79.84: ICD-10-CM

## 2021-12-04 DIAGNOSIS — S80.212A: Primary | ICD-10-CM

## 2021-12-04 DIAGNOSIS — I10: ICD-10-CM

## 2021-12-04 DIAGNOSIS — E86.0: ICD-10-CM

## 2021-12-04 DIAGNOSIS — S50.312A: ICD-10-CM

## 2021-12-04 DIAGNOSIS — Z79.01: ICD-10-CM

## 2021-12-04 DIAGNOSIS — R29.6: ICD-10-CM

## 2021-12-04 DIAGNOSIS — Z79.52: ICD-10-CM

## 2021-12-04 DIAGNOSIS — E11.9: ICD-10-CM

## 2021-12-04 LAB
ALBUMIN SERPL-MCNC: 3.9 GM/DL (ref 3.2–4.5)
ALP SERPL-CCNC: 93 U/L (ref 40–136)
ALT SERPL-CCNC: 15 U/L (ref 0–55)
APTT BLD: 26 SEC (ref 24–35)
BASOPHILS # BLD AUTO: 0.1 10^3/UL (ref 0–0.1)
BASOPHILS NFR BLD AUTO: 1 % (ref 0–10)
BILIRUB SERPL-MCNC: 1.2 MG/DL (ref 0.1–1)
BUN/CREAT SERPL: 15
CALCIUM SERPL-MCNC: 9.3 MG/DL (ref 8.5–10.1)
CHLORIDE SERPL-SCNC: 98 MMOL/L (ref 98–107)
CO2 SERPL-SCNC: 29 MMOL/L (ref 21–32)
CREAT SERPL-MCNC: 1.24 MG/DL (ref 0.6–1.3)
D DIMER PPP FEU-MCNC: 0.28 UG/ML (ref 0–0.49)
EOSINOPHIL # BLD AUTO: 0.2 10^3/UL (ref 0–0.3)
EOSINOPHIL NFR BLD AUTO: 2 % (ref 0–10)
GFR SERPLBLD BASED ON 1.73 SQ M-ARVRAT: 61 ML/MIN
GLUCOSE SERPL-MCNC: 291 MG/DL (ref 70–105)
HCT VFR BLD CALC: 45 % (ref 40–54)
HGB BLD-MCNC: 14.9 G/DL (ref 13.3–17.7)
INR PPP: 1.1 (ref 0.8–1.4)
LYMPHOCYTES # BLD AUTO: 2.1 10^3/UL (ref 1–4)
LYMPHOCYTES NFR BLD AUTO: 20 % (ref 12–44)
MANUAL DIFFERENTIAL PERFORMED BLD QL: NO
MCH RBC QN AUTO: 28 PG (ref 25–34)
MCHC RBC AUTO-ENTMCNC: 33 G/DL (ref 32–36)
MCV RBC AUTO: 84 FL (ref 80–99)
MONOCYTES # BLD AUTO: 1 10^3/UL (ref 0–1)
MONOCYTES NFR BLD AUTO: 9 % (ref 0–12)
NEUTROPHILS # BLD AUTO: 7.3 10^3/UL (ref 1.8–7.8)
NEUTROPHILS NFR BLD AUTO: 69 % (ref 42–75)
PLATELET # BLD: 354 10^3/UL (ref 130–400)
PMV BLD AUTO: 10.4 FL (ref 9–12.2)
POTASSIUM SERPL-SCNC: 3.3 MMOL/L (ref 3.6–5)
PROT SERPL-MCNC: 7.3 GM/DL (ref 6.4–8.2)
PROTHROMBIN TIME: 14.6 SEC (ref 12.2–14.7)
SODIUM SERPL-SCNC: 139 MMOL/L (ref 135–145)
WBC # BLD AUTO: 10.7 10^3/UL (ref 4.3–11)

## 2021-12-04 PROCEDURE — 83605 ASSAY OF LACTIC ACID: CPT

## 2021-12-04 PROCEDURE — 85730 THROMBOPLASTIN TIME PARTIAL: CPT

## 2021-12-04 PROCEDURE — 82947 ASSAY GLUCOSE BLOOD QUANT: CPT

## 2021-12-04 PROCEDURE — 87040 BLOOD CULTURE FOR BACTERIA: CPT

## 2021-12-04 PROCEDURE — 36415 COLL VENOUS BLD VENIPUNCTURE: CPT

## 2021-12-04 PROCEDURE — 85610 PROTHROMBIN TIME: CPT

## 2021-12-04 PROCEDURE — 85025 COMPLETE CBC W/AUTO DIFF WBC: CPT

## 2021-12-04 PROCEDURE — 85379 FIBRIN DEGRADATION QUANT: CPT

## 2021-12-04 PROCEDURE — 80053 COMPREHEN METABOLIC PANEL: CPT

## 2021-12-04 PROCEDURE — 71045 X-RAY EXAM CHEST 1 VIEW: CPT

## 2021-12-04 NOTE — ED GENERAL
General


Chief Complaint:  General Problems/Pain


Stated Complaint:  FALL


Source of Information:  Patient, Caregiver (family - step son), Old Records


Exam Limitations:  No Limitations





History of Present Illness


Date Seen by Provider:  Dec 4, 2021


Time Seen by Provider:  08:30


Initial Comments


Patient is a 51-year-old male who presents to the emergency department by 

ambulance today with a chief complaint of generalized weakness.  His stepson is 

at the bedside for further history.  Relates that he had a hospitalization with 

Covid in January of this year.  He has had progressive decline and disability 

since that time.  According to the medical record and the son TIA versus stroke 

earlier in the year.  Patient has a history of hypertension and diabetes.  He is

on Plavix.  He did have 2 falls yesterday, he states he just feels weak.  No 

reported fevers or chills.  A little bit of a cough.  No real shortness of 

breath or chest pain.  No abdominal pain.  He has chronic diarrhea.  No swelling

in his legs or cramping in his calves.  No problems with urination.  Patient 

denies headache.  He complains of a little dizziness with standing.  Cindy who

is at the bedside states that his mom is out of town in [place] about 2 hours 

away and when she is gone his brother assists their stepdad.  This morning he 

was over at the house and they were trying to get him up into the bathroom when 

he became so weak in the knees they had to set them down twice.





He is treated at [facility] and reportedly saw his providers on Monday and Tuesday of 

this week





Patient has small abrasions to the left elbow and left knee as a result of falls

yesterday.





All other review of systems reviewed and negative except as stated.


Timing/Duration:  5-6 Days


Severity:  Moderate


Modifying Factors:  improves with Immobilization


Associated Systoms:  Weakness, Other (dizzy with standing)





Allergies and Home Medications


Allergies


Coded Allergies:  


     No Known Drug Allergies (Unverified , 1/24/13)





Patient Home Medication List


Home Medication List Reviewed:  Yes


Lisinopril (Lisinopril) 20 Mg Tablet, 20 MG PO DAILY


   Prescribed by: TASNEEM MOELLER on 1/19/21 1117


Metformin HCl (Metformin HCl ER) 500 Mg Tab.er.24, 500 MG PO DAILY


   Prescribed by: TASNEEM MOELLER on 1/19/21 1117


Metoprolol Tartrate (Metoprolol Tartrate) 25 Mg Tablet, 25 MG PO BID


   Prescribed by: TASNEEM MOELLER on 1/19/21 1117


Prednisone (Prednisone) 20 Mg Tab, 20 MG PO DAILY


   Prescribed by: TASNEEM MOELLER on 1/19/21 1117





Review of Systems


Review of Systems


Constitutional:  see HPI


EENTM:  no symptoms reported


Respiratory:  cough


Cardiovascular:  no symptoms reported


Gastrointestinal:  diarrhea (chronic)


Genitourinary:  no symptoms reported


Musculoskeletal:  no symptoms reported


Skin:  other (abrasions)


Psychiatric/Neurological:  Other (dizziness)





All Other Systems Reviewed


Negative Unless Noted:  Yes





Past Medical-Social-Family Hx


Seasonal Allergies


Seasonal Allergies:  No





Past Medical History


Surgeries:  Yes


Abdominal, Appendectomy, Bowel Surgery, Gallbladder


Respiratory:  No


Cardiac:  Yes


Hypertension


Neurological:  No


Reproductive Disorders:  No


Sexually Transmitted Disease:  No


HIV/AIDS:  No


Genitourinary:  No


Gastrointestinal:  Yes (APPY;JUAN; PARTIAL COLECTOMY)


Crohns Disease, Gall Bladder Disease


Musculoskeletal:  No


Endocrine:  Yes (NON-COMPLIANT-DOES NOT TAKE MEDS, CHECK GLUCOSE OR FOLLOW DIET)


Diabetes, Non-Insulin dep


HEENT:  Yes (02/2020--PERTONSILLAR ABSCESS/TRANSFERRED TO [place].NO SURGERY )


Cancer:  No


Psychosocial:  No


Integumentary:  No


Blood Disorders:  No


Adverse Reaction/Blood Tranf:  No





Physical Exam


Vital Signs





Vital Signs - First Documented








 12/4/21 12/4/21





 08:17 09:08


 


Temp 35.8 


 


Pulse 87 


 


Resp 18 


 


B/P (MAP) 131/87 (102) 


 


Pulse Ox 95 


 


O2 Delivery Nasal Cannula 


 


O2 Flow Rate 2.00 


 


FiO2  95





Capillary Refill :


Height, Weight, BMI


Height: '"


Weight: lbs. oz. kg; 41.00 BMI


Method:


General Appearance:  No Apparent Distress, WD/WN


Eyes:  Bilateral Eye Normal Inspection, Bilateral Eye PERRL, Bilateral Eye EOMI


HEENT:  PERRL/EOMI


Neck:  Normal Inspection


Respiratory:  Lungs Clear, Normal Breath Sounds, No Accessory Muscle Use, No 

Respiratory Distress, Other (O2 sats 90-96% on RA)


Cardiovascular:  Regular Rate, Rhythm (80's), Normal Peripheral Pulses


Gastrointestinal:  Normal Bowel Sounds, Non Tender, Soft


Extremity:  Normal Inspection, Normal Range of Motion, No Calf Tenderness, No 

Pedal Edema


Neurologic/Psychiatric:  Alert, Oriented x3, No Motor/Sensory Deficits, Other 

(flat affect; slow responses; a little confused as to when he last saw his PCP)


Skin:  Normal Color, Warm/Dry, Other (abrasions left elbow and left knee)





Focused Exam


Lactate Level


12/4/21 09:00: Lactic Acid Level 3.74*H


12/4/21 11:19: Lactic Acid Level 1.94





Lactic Acid Level





Laboratory Tests








Test


 12/4/21


09:00 12/4/21


11:19


 


Lactic Acid Level


 3.74 MMOL/L


(0.50-2.00)  *H 1.94 MMOL/L


(0.50-2.00)











Progress/Results/Core Measures


Suspected Sepsis


SIRS


Temperature: 


Pulse:  


Respiratory Rate: 


 


Laboratory Tests


12/4/21 08:30: White Blood Count 10.7


Blood Pressure  / 


Mean: 


 





12/4/21 09:00: Lactic Acid Level 3.74*H


12/4/21 11:19: Lactic Acid Level 1.94


Laboratory Tests


12/4/21 08:30: 


Creatinine 1.24, INR Comment 1.1, Platelet Count 354, Total Bilirubin 1.2H








Results/Orders


Lab Results





Laboratory Tests








Test


 12/4/21


00:00 12/4/21


08:22 12/4/21


08:30 12/4/21


09:00 Range/Units


 


 


Glucometer  284 H     MG/DL


 


White Blood Count


 


 


 10.7 


 


 4.3-11.0


10^3/uL


 


Red Blood Count


 


 


 5.30 


 


 4.30-5.52


10^6/uL


 


Hemoglobin   14.9   13.3-17.7  g/dL


 


Hematocrit   45   40-54  %


 


Mean Corpuscular Volume   84   80-99  fL


 


Mean Corpuscular Hemoglobin   28   25-34  pg


 


Mean Corpuscular Hemoglobin


Concent 


 


 33 


 


 32-36  g/dL





 


Red Cell Distribution Width   13.0   10.0-14.5  %


 


Platelet Count


 


 


 354 


 


 130-400


10^3/uL


 


Mean Platelet Volume   10.4   9.0-12.2  fL


 


Immature Granulocyte % (Auto)   1    %


 


Neutrophils (%) (Auto)   69   42-75  %


 


Lymphocytes (%) (Auto)   20   12-44  %


 


Monocytes (%) (Auto)   9   0-12  %


 


Eosinophils (%) (Auto)   2   0-10  %


 


Basophils (%) (Auto)   1   0-10  %


 


Neutrophils # (Auto)


 


 


 7.3 


 


 1.8-7.8


10^3/uL


 


Lymphocytes # (Auto)


 


 


 2.1 


 


 1.0-4.0


10^3/uL


 


Monocytes # (Auto)


 


 


 1.0 


 


 0.0-1.0


10^3/uL


 


Eosinophils # (Auto)


 


 


 0.2 


 


 0.0-0.3


10^3/uL


 


Basophils # (Auto)


 


 


 0.1 


 


 0.0-0.1


10^3/uL


 


Immature Granulocyte # (Auto)


 


 


 0.1 


 


 0.0-0.1


10^3/uL


 


Prothrombin Time   14.6   12.2-14.7  SEC


 


INR Comment   1.1   0.8-1.4  


 


Activated Partial


Thromboplast Time 


 


 26 


 


 24-35  SEC





 


D-Dimer


 


 


 0.28 


 


 0.00-0.49


UG/ML


 


Sodium Level   139   135-145  MMOL/L


 


Potassium Level   3.3 L  3.6-5.0  MMOL/L


 


Chloride Level   98     MMOL/L


 


Carbon Dioxide Level   29   21-32  MMOL/L


 


Anion Gap   12   5-14  MMOL/L


 


Blood Urea Nitrogen   18   7-18  MG/DL


 


Creatinine


 


 


 1.24 


 


 0.60-1.30


MG/DL


 


Estimat Glomerular Filtration


Rate 


 


 61 


 


  





 


BUN/Creatinine Ratio   15    


 


Glucose Level   291 H    MG/DL


 


Calcium Level   9.3   8.5-10.1  MG/DL


 


Corrected Calcium   9.4   8.5-10.1  MG/DL


 


Total Bilirubin   1.2 H  0.1-1.0  MG/DL


 


Aspartate Amino Transf


(AST/SGOT) 


 


 9 


 


 5-34  U/L





 


Alanine Aminotransferase


(ALT/SGPT) 


 


 15 


 


 0-55  U/L





 


Alkaline Phosphatase   93     U/L


 


Total Protein   7.3   6.4-8.2  GM/DL


 


Albumin   3.9   3.2-4.5  GM/DL


 


Lactic Acid Level


 


 


 


 3.74 *H


 0.50-2.00


MMOL/L


 


Test


 12/4/21


11:19 


 


 


 Range/Units


 


 


Lactic Acid Level


 1.94 


 


 


 


 0.50-2.00


MMOL/L








My Orders





Orders - MORIAH ELLISON MD


Cbc With Automated Diff (12/4/21 08:44)


Comprehensive Metabolic Panel (12/4/21 08:44)


Blood Culture (12/4/21 08:44)


Sputum Culture (12/4/21 08:44)


Urinalysis (12/4/21 08:44)


Urine Culture (12/4/21 08:44)


Protime With Inr (12/4/21 08:44)


Partial Thromboplastin Time (12/4/21 08:44)


Chest 1 View, Ap/Pa Only (12/4/21 08:44)


Ed Iv/Invasive Line Start (12/4/21 08:44)


Ed Iv/Invasive Line Start (12/4/21 08:44)


Vital Signs Adult Sepsis Patie Q15M (12/4/21 08:44)


O2 (12/4/21 08:44)


Remove Rings In Anticipation O (12/4/21 08:44)


Lactic Acid Analyzer (12/4/21 08:44)


Fibrin Degradation Products (12/4/21 08:44)


Ns Iv 1000 Ml (Sodium Chloride 0.9%) (12/4/21 09:45)


Bladder Scan (12/4/21 09:39)


General/Regular (12/4/21 Lunch)





Vital Signs/I&O











 12/4/21 12/4/21





 08:17 09:08


 


Temp 35.8 


 


Pulse 87 


 


Resp 18 


 


B/P (MAP) 131/87 (102) 


 


Pulse Ox 95 95


 


O2 Delivery Nasal Cannula Nasal Cannula


 


O2 Flow Rate 2.00 2.00


 


FiO2  95





Capillary Refill :


Progress Note #1:  


   Time:  10:11


Progress Note


Bladder scan revealed only about 36ml.  Will load with fluids, rest of workup 

unremarkable except for lactic acid.  Which is slightly greater than 3.  Suspect

dehydration rather than sepsis. Patient without any infectious complaints.  No 

fever, tachycardia (although betablocker on board).  no source for infection as 

of yet.  No urine has been evaluated yet, however.  Will reassess.


Progress Note #2:  


   Time:  12:35


Progress Note


Still waiting on voided specimen from patient.  He has been treated with a liter

of normal saline here in the department, he has been drinking water and offered 

a lunch tray.  His labs show that his lactic acid has improved.  On reassessment

the patient looks much better.  Vital signs remained stable, his eyes are little

brighter.  His wife is now present at the bedside and states that even she feels

like he looks better than yesterday.  Would like to have a urine to prove that 

he has no urinary tract infection however it looks like we may not get one.  He 

is asymptomatic from urinary tract infection.  Will offer discharge and see if 

they want to follow-up with their primary care provider through [facility] to check his

urine later next week.





I spoke with the wife and the patient, they are agreeable, would like to maybe 

go ahead and be discharged home.  If he develops any symptoms over the weekend 

such as dysuria, urgency or frequency or fever they will recheck with her 

primary care provider at [facility].  We will get him sitting up in the bed and standi

ng and make sure that he is able to get around independently.  He looks good, 

feels a lot better after his IV fluids.





Departure


Impression





   Primary Impression:  


   Generalized weakness


   Additional Impression:  


   Dehydration


Disposition:  01 HOME, SELF-CARE


Condition:  Stable





Departure-Patient Inst.


Decision time for Depature:  12:41


Referrals:  


[facility] (PCP/Family)


Primary Care Physician


Patient Instructions:  Weakness ED, Dehydration, Adult (DC)





Add. Discharge Instructions:  


Try to push fluids over the course of the next 24 hours in order to maintain 

hydration.





Continue your daily medications as prescribed.





If you develop any burning with urination or fever, nausea or abdominal pain 

please come back to the emergency room for reevaluation.





Please follow-up with your primary care physician as needed.





Copy


Copies To 1:   CRUZ MARTINES KATHRYN M MD          Dec 4, 2021 08:51

## 2021-12-04 NOTE — DIAGNOSTIC IMAGING REPORT
Portable erect AP chest at 9:07.



Indication:  Increased weakness, lethargy.



The heart size is at the upper limits of normal but stable when

compared to 06/16/2021. The lungs remain clear. There still no

sign of failure, pneumonia or pleural effusion. The mediastinum

is not widened. The osseous structures are intact.



Impression:

Stable chest. There has been no adverse change since the prior

exam.



Dictated by: 



  Dictated on workstation # [id number]

## 2022-01-13 ENCOUNTER — HOSPITAL ENCOUNTER (EMERGENCY)
Dept: HOSPITAL 75 - ER | Age: 52
Discharge: HOME | End: 2022-01-13
Payer: SELF-PAY

## 2022-01-13 VITALS — SYSTOLIC BLOOD PRESSURE: 151 MMHG | DIASTOLIC BLOOD PRESSURE: 112 MMHG

## 2022-01-13 VITALS — HEIGHT: 69.02 IN | WEIGHT: 279.99 LBS | BODY MASS INDEX: 41.47 KG/M2

## 2022-01-13 DIAGNOSIS — I10: ICD-10-CM

## 2022-01-13 DIAGNOSIS — K50.90: ICD-10-CM

## 2022-01-13 DIAGNOSIS — Z91.19: ICD-10-CM

## 2022-01-13 DIAGNOSIS — E86.0: ICD-10-CM

## 2022-01-13 DIAGNOSIS — J44.9: ICD-10-CM

## 2022-01-13 DIAGNOSIS — Z86.73: ICD-10-CM

## 2022-01-13 DIAGNOSIS — R19.7: ICD-10-CM

## 2022-01-13 DIAGNOSIS — Z79.84: ICD-10-CM

## 2022-01-13 DIAGNOSIS — Z86.16: ICD-10-CM

## 2022-01-13 DIAGNOSIS — E87.6: ICD-10-CM

## 2022-01-13 DIAGNOSIS — E11.65: ICD-10-CM

## 2022-01-13 DIAGNOSIS — R53.1: Primary | ICD-10-CM

## 2022-01-13 DIAGNOSIS — Z79.52: ICD-10-CM

## 2022-01-13 LAB
APTT PPP: YELLOW S
BACTERIA #/AREA URNS HPF: NEGATIVE /HPF
BASOPHILS # BLD AUTO: 0 10^3/UL (ref 0–0.1)
BASOPHILS NFR BLD AUTO: 0 % (ref 0–10)
BILIRUB UR QL STRIP: NEGATIVE
BUN/CREAT SERPL: 7
CALCIUM SERPL-MCNC: 8.9 MG/DL (ref 8.5–10.1)
CHLORIDE SERPL-SCNC: 101 MMOL/L (ref 98–107)
CO2 SERPL-SCNC: 23 MMOL/L (ref 21–32)
CREAT SERPL-MCNC: 0.96 MG/DL (ref 0.6–1.3)
EOSINOPHIL # BLD AUTO: 0.5 10^3/UL (ref 0–0.3)
EOSINOPHIL NFR BLD AUTO: 5 % (ref 0–10)
FIBRINOGEN PPP-MCNC: CLEAR MG/DL
GFR SERPLBLD BASED ON 1.73 SQ M-ARVRAT: 95 ML/MIN
GLUCOSE SERPL-MCNC: 504 MG/DL (ref 70–105)
GLUCOSE UR STRIP-MCNC: (no result) MG/DL
HCT VFR BLD CALC: 44 % (ref 40–54)
HGB BLD-MCNC: 14.8 G/DL (ref 13.3–17.7)
KETONES UR QL STRIP: NEGATIVE
LEUKOCYTE ESTERASE UR QL STRIP: NEGATIVE
LYMPHOCYTES # BLD AUTO: 2.5 10^3/UL (ref 1–4)
LYMPHOCYTES NFR BLD AUTO: 28 % (ref 12–44)
MANUAL DIFFERENTIAL PERFORMED BLD QL: NO
MCH RBC QN AUTO: 28 PG (ref 25–34)
MCHC RBC AUTO-ENTMCNC: 34 G/DL (ref 32–36)
MCV RBC AUTO: 83 FL (ref 80–99)
MONOCYTES # BLD AUTO: 0.9 10^3/UL (ref 0–1)
MONOCYTES NFR BLD AUTO: 10 % (ref 0–12)
NEUTROPHILS # BLD AUTO: 5.1 10^3/UL (ref 1.8–7.8)
NEUTROPHILS NFR BLD AUTO: 57 % (ref 42–75)
NITRITE UR QL STRIP: NEGATIVE
PH UR STRIP: 6 [PH] (ref 5–9)
PLATELET # BLD: 328 10^3/UL (ref 130–400)
PMV BLD AUTO: 10.7 FL (ref 9–12.2)
POTASSIUM SERPL-SCNC: 3.1 MMOL/L (ref 3.6–5)
PROT UR QL STRIP: NEGATIVE
RBC #/AREA URNS HPF: (no result) /HPF
SODIUM SERPL-SCNC: 138 MMOL/L (ref 135–145)
SP GR UR STRIP: 1.01 (ref 1.02–1.02)
SQUAMOUS #/AREA URNS HPF: (no result) /HPF
WBC # BLD AUTO: 9 10^3/UL (ref 4.3–11)
WBC #/AREA URNS HPF: (no result) /HPF

## 2022-01-13 PROCEDURE — 80048 BASIC METABOLIC PNL TOTAL CA: CPT

## 2022-01-13 PROCEDURE — 96361 HYDRATE IV INFUSION ADD-ON: CPT

## 2022-01-13 PROCEDURE — 82947 ASSAY GLUCOSE BLOOD QUANT: CPT

## 2022-01-13 PROCEDURE — 96374 THER/PROPH/DIAG INJ IV PUSH: CPT

## 2022-01-13 PROCEDURE — 36415 COLL VENOUS BLD VENIPUNCTURE: CPT

## 2022-01-13 PROCEDURE — 85025 COMPLETE CBC W/AUTO DIFF WBC: CPT

## 2022-01-13 PROCEDURE — 93005 ELECTROCARDIOGRAM TRACING: CPT

## 2022-01-13 PROCEDURE — 81000 URINALYSIS NONAUTO W/SCOPE: CPT

## 2022-01-13 PROCEDURE — 82010 KETONE BODYS QUAN: CPT

## 2022-01-13 NOTE — ED GENERAL
General


Chief Complaint:  Abdominal/GI Problems


Stated Complaint:  DIARRHEA


Nursing Triage Note:  


PT AMBULATE TO ROOM WITH C/O DIARRHEA X2 DAYS. PT DENIES N/V, ABD PAIN. PT 


REPORTS HX OF CROHNS DISEASE. PT REPORTS MULTIPLE STROKES RELATED TO COVID.


Source of Information:  Patient, Family


Exam Limitations:  No Limitations


 (MORIAH ELLISON MD)





History of Present Illness


Date Seen by Provider:  Jan 13, 2022


Time Seen by Provider:  17:38


Initial Comments


Patient is a 51-year-old male who presents to the emergency room with a chief 

complaint of generalized weakness, fatigue, multiple episodes of diarrhea over 

the last week and a half.  He does this on a month-to-month basis, his wife 

states usually when he gets IV fluids it perks him up and he improves.  He has a

history of severe COVID-pneumonia/respiratory failure and subsequent ischemic 

strokes that lit left him quite debilitated.  He follows at [facility].  He is unable 

to see a GI doctor for the recurrent bouts of diarrhea secondary to insurance 

problems.  Wife anticipates having insurance within the next week.





Patient denies any recent illnesses such as fevers, chills, upper respiratory 

congestion.  No chest pain or shortness of breath.  No abdominal pain.  No black

or bloody stools.  He has urinated fairly frequently throughout the day every 

time he has a bowel movement.  Wife reports too numerous to count stools, 

nonblack nonbloody.





He does state that he feels very weak, he is a little lightheaded and dizzy when

he stands up.





Of note he is significantly tachycardic with a heart rate of 137 on my initial 

evaluation





All other review of systems reviewed and negative except as stated.


Timing/Duration:  1 Week (1 to 2 weeks)


Severity:  Moderate


Associated Systoms:  Malaise, Weakness (MORIAH ELLISON MD)





Allergies and Home Medications


Allergies


Coded Allergies:  


     No Known Drug Allergies (Unverified , 1/24/13)





Patient Home Medication List


Home Medication List Reviewed:  Yes


 (MORIAH ELLISON MD)


Lisinopril (Lisinopril) 20 Mg Tablet, 20 MG PO DAILY


   Prescribed by: TASNEEM MOELLER on 1/19/21 1117


Metformin HCl (Metformin HCl ER) 500 Mg Tab.er.24, 500 MG PO DAILY


   Prescribed by: TASNEEM MOELLER on 1/19/21 1117


Metoprolol Tartrate (Metoprolol Tartrate) 25 Mg Tablet, 25 MG PO BID


   Prescribed by: TASNEEM MOELLER on 1/19/21 1117


Prednisone (Prednisone) 20 Mg Tab, 20 MG PO DAILY


   Prescribed by: TASNEEM MOELLER on 1/19/21 1117





Review of Systems


Review of Systems


Constitutional:  see HPI


EENTM:  no symptoms reported


Respiratory:  no symptoms reported


Gastrointestinal:  diarrhea


Genitourinary:  no symptoms reported


Musculoskeletal:  no symptoms reported


Skin:  no symptoms reported (MORIAH ELLISON MD)





All Other Systems Reviewed


Negative Unless Noted:  Yes


 (MORIAH ELLISON MD)





Past Medical-Social-Family Hx


Patient Social History


Tobacco Use?:  No


Smoking Status:  Never a Smoker


Smokeless Tobacco Frequency:  Never a User


Use of E-Cig and/or Vaping dev:  No


Use of E-Cig and/or Vaping Mehran:  Never a User


Substance use?:  No


Alcohol Use?:  No


 (MORIAH ELLISON MD)





Immunizations Up To Date


First/Initial COVID19 Vaccinat:  01/2021


Second COVID19 Vaccination Misael:  01/2021


COVID19 Vaccine :  PFIZER


 (MORIAH ELLISON MD)





Seasonal Allergies


Seasonal Allergies:  No


 (MORIAH ELLISON MD)





Past Medical History


Surgery/Hospitalization HX:  


POST COVID, DIABETES, AND CROHNS DISEASE


Surgeries:  Yes


Abdominal, Appendectomy, Bowel Surgery, Gallbladder


Respiratory:  No


Cardiac:  Yes


Hypertension


Neurological:  No


Reproductive Disorders:  No


Sexually Transmitted Disease:  No


HIV/AIDS:  No


Genitourinary:  No


Gastrointestinal:  Yes (APPY;JUAN; PARTIAL COLECTOMY)


Crohns Disease, Gall Bladder Disease


Musculoskeletal:  No


Endocrine:  Yes (NON-COMPLIANT-DOES NOT TAKE MEDS, CHECK GLUCOSE OR FOLLOW DIET)


Diabetes, Non-Insulin dep


HEENT:  Yes (02/2020--PERTONSILLAR ABSCESS/TRANSFERRED TO [place].NO SURGERY )


Cancer:  No


Psychosocial:  No


Integumentary:  No


Blood Disorders:  No


Adverse Reaction/Blood Tranf:  No


 (MORIAH ELLISON MD)





Physical Exam


Vital Signs





Vital Signs - First Documented








 1/13/22





 16:57


 


Temp 36.8


 


Pulse 135


 


Resp 19


 


B/P (MAP) 120/95 (103)


 


O2 Delivery Room Air





 (ILIANA,DAVY K DO)


Vital Signs


Capillary Refill : Less Than 3 Seconds 


 (MORIAH ELLISON MD)


Height, Weight, BMI


Height: '"


Weight: lbs. oz. kg; 41.00 BMI


Method:


General Appearance:  No Apparent Distress


Eyes:  Bilateral Eye Normal Inspection, Bilateral Eye PERRL, Bilateral Eye EOMI


HEENT:  PERRL/EOMI, Moist Mucous Membranes


Neck:  Normal Inspection


Respiratory:  Lungs Clear, Normal Breath Sounds, No Accessory Muscle Use, No 

Respiratory Distress


Cardiovascular:  Regular Rate, Rhythm, Normal Peripheral Pulses, Tachycardia 

(137)


Gastrointestinal:  Non Tender, Soft, Abnormal Bowel Sounds (Hypoactive)


Extremity:  Normal Capillary Refill, Normal Inspection, Normal Range of Motion, 

No Pedal Edema


Neurologic/Psychiatric:  Alert, Oriented x3, No Motor/Sensory Deficits, Normal 

Mood/Affect, Other (At neurologic baseline per wife.  Slow deliberate speech, 

flat affect)


Skin:  Normal Color, Warm/Dry (MORIAH ELLISON MD)





Progress/Results/Core Measures


Suspected Sepsis


SIRS


Temperature: 


Pulse: 135 


Respiratory Rate: 19


 


Blood Pressure 120 /95 


Mean: 103


 


 (MORIAH ELLISON MD)





Results/Orders


Lab Results





Laboratory Tests








Test


 1/13/22


17:04 1/13/22


18:58 1/13/22


19:55 Range/Units


 


 


White Blood Count


 9.0 


 


 


 4.3-11.0


10^3/uL


 


Red Blood Count


 5.25 


 


 


 4.30-5.52


10^6/uL


 


Hemoglobin 14.8    13.3-17.7  g/dL


 


Hematocrit 44    40-54  %


 


Mean Corpuscular Volume 83    80-99  fL


 


Mean Corpuscular Hemoglobin 28    25-34  pg


 


Mean Corpuscular Hemoglobin


Concent 34 


 


 


 32-36  g/dL





 


Red Cell Distribution Width 13.2    10.0-14.5  %


 


Platelet Count


 328 


 


 


 130-400


10^3/uL


 


Mean Platelet Volume 10.7    9.0-12.2  fL


 


Immature Granulocyte % (Auto) 0     %


 


Neutrophils (%) (Auto) 57    42-75  %


 


Lymphocytes (%) (Auto) 28    12-44  %


 


Monocytes (%) (Auto) 10    0-12  %


 


Eosinophils (%) (Auto) 5    0-10  %


 


Basophils (%) (Auto) 0    0-10  %


 


Neutrophils # (Auto)


 5.1 


 


 


 1.8-7.8


10^3/uL


 


Lymphocytes # (Auto)


 2.5 


 


 


 1.0-4.0


10^3/uL


 


Monocytes # (Auto)


 0.9 


 


 


 0.0-1.0


10^3/uL


 


Eosinophils # (Auto)


 0.5 H


 


 


 0.0-0.3


10^3/uL


 


Basophils # (Auto)


 0.0 


 


 


 0.0-0.1


10^3/uL


 


Immature Granulocyte # (Auto)


 0.0 


 


 


 0.0-0.1


10^3/uL


 


Sodium Level 138    135-145  MMOL/L


 


Potassium Level 3.1 L   3.6-5.0  MMOL/L


 


Chloride Level 101      MMOL/L


 


Carbon Dioxide Level 23    21-32  MMOL/L


 


Anion Gap 14    5-14  MMOL/L


 


Blood Urea Nitrogen 7    7-18  MG/DL


 


Creatinine


 0.96 


 


 


 0.60-1.30


MG/DL


 


Estimat Glomerular Filtration


Rate 95 


 


 


  





 


BUN/Creatinine Ratio 7     


 


Glucose Level 504 *H     MG/DL


 


Calcium Level 8.9    8.5-10.1  MG/DL


 


Beta-Hydroxybutyrate (Chem


panel) 0.10 


 


 


 0.00-0.27


MMOL/L


 


Urine Color  YELLOW    


 


Urine Clarity  CLEAR    


 


Urine pH  6.0   5-9  


 


Urine Specific Gravity  1.010 L  1.016-1.022  


 


Urine Protein  NEGATIVE   NEGATIVE  


 


Urine Glucose (UA)  3+ H  NEGATIVE  


 


Urine Ketones  NEGATIVE   NEGATIVE  


 


Urine Nitrite  NEGATIVE   NEGATIVE  


 


Urine Bilirubin  NEGATIVE   NEGATIVE  


 


Urine Urobilinogen  0.2   < = 1.0  MG/DL


 


Urine Leukocyte Esterase  NEGATIVE   NEGATIVE  


 


Urine RBC (Auto)  NEGATIVE   NEGATIVE  


 


Urine RBC  NONE    /HPF


 


Urine WBC  NONE    /HPF


 


Urine Squamous Epithelial


Cells 


 0-2 


 


  /HPF





 


Urine Crystals  NONE    /LPF


 


Urine Bacteria  NEGATIVE    /HPF


 


Urine Casts  NONE    /LPF


 


Urine Mucus  NEGATIVE    /LPF


 


Urine Culture Indicated  NO    


 


Glucometer   316 H   MG/DL





 (ILIANA,DAVY K DO)


My Orders





Orders - ILIANA,DAVY K DO


Insulin (Regular) Human (Novolin R (Per (1/13/22 18:30)


Ed Iv/Invasive Line Start (1/13/22 18:21)


Ns Iv 1000 Ml (Sodium Chloride 0.9%) (1/13/22 18:30)


Potassium Chloride (Tablet) (Klor Con Ta (1/13/22 18:30)


Ua Culture If Indicated (1/13/22 18:28)


Beta Hydroxybutyrate (1/13/22 18:28)


Accucheck Stat ONCE (1/13/22 19:30)


Ed Iv/Invasive Line Start (1/13/22 19:57)


Ns Iv 1000 Ml (Sodium Chloride 0.9%) (1/13/22 20:00)


Accucheck Stat ONCE (1/13/22 20:08)


 (DAVY VASQUEZ DO)


Medications Given in ED





Current Medications








 Medications  Dose


 Ordered  Sig/Iris


 Route  Start Time


 Stop Time Status Last Admin


Dose Admin


 


 Insulin Human


 Regular  15 unit  ONCE  ONCE


 IV  1/13/22 18:30


 1/13/22 18:31 DC 1/13/22 18:52


15 UNIT


 


 Potassium Chloride  40 meq  ONCE  ONCE


 PO  1/13/22 18:30


 1/13/22 18:31 DC 1/13/22 18:52


40 MEQ





 (DAVY VASQUEZ DO)


Vital Signs/I&O











 1/13/22





 16:57


 


Temp 36.8


 


Pulse 135


 


Resp 19


 


B/P (MAP) 120/95 (103)


 


O2 Delivery Room Air





 (DAVY VASQUEZ DO)


Vital Signs/I&O


Capillary Refill : Less Than 3 Seconds 


 (MORIAH ELLISON MD)








Blood Pressure Mean:                    103








Progress Note :  


   Time:  17:46


Progress Note


care passed to Dr Vasquez with labs pending and pending re-evaluation after IVF 

bolus


 (MORIAH ELLISON MD)


Progress Note :  


Progress Note


1800--ASSUMED CARE OF PATIENT FROM DR. ELLISON, LABS PENDING,  PT WITH HR 

AROUND 110'S AT THIS TIME AND PT HAS NO COMPLAINTS. PT WATCHING PORNOGRAPHIC 

MATERIAL ON HIS PHONE AT THIS TIME. 





PT HAS BEEN GIVEN IV FLUIDS AND ORAL POTASSIUM





REPEAT ACCUCHECK 316. 


REPEAT ACCUCHECK  PRIOR TO DISMISSAL 293





NO COMPLAINTS AT DISMISSAL


 (DAVY VASQUEZ DO)





ECG


Initial ECG Impression Date:  Jan 13, 2022


Initial ECG Impression Time:  18:01


Initial ECG Rate:  128


Initial ECG Rhythm:  S.Tach


Initial ECG Impression:  Nonspecific Changes


 (DAVY VASQUEZ DO)





Departure


Communication (Admissions)


1940--DISCUSSED WITH DR. MOELLER. WILL HAVE PT FOLLOW UP IN CLINIC AND FOR THE 

FUTURE, CAN ARRANGE FOR OUTPATIENT IV FLUIDS AS NEEDED. 


 (DAVY VASQUEZ DO)





Impression





   Primary Impression:  


   Generalized weakness


   Additional Impressions:  


   Uncontrolled diabetes mellitus


   Dehydration


   Hypokalemia


Disposition:  01 HOME, SELF-CARE


Condition:  Improved





Departure-Patient Inst.


Referrals:  


[facility] (PCP/Family)


Primary Care Physician


Patient Instructions:  DIABETES, Dehydration, Adult (DC), Diarrhea in 

Adolescents and Adults





Add. Discharge Instructions:  


CONTINUE YOUR MEDICATIONS AS PRESCRIBED





CHECK YOUR BLOOD SUGARS AT LEAST 3 TIMES A DAY AND KEEP DIARY





FOLLOW UP WITH [facility] IN THE NEXT FEW DAYS FOR FOLLOW UP CARE. 





RETURN TO ER IF SYMPTOMS WORSEN





All discharge instructions reviewed with patient and/or family. Voiced 

understanding.











MORIAH ELLISON MD         Jan 13, 2022 17:46


DAVY VASQUEZ DO                 Jan 13, 2022 18:43

## 2022-02-08 ENCOUNTER — HOSPITAL ENCOUNTER (EMERGENCY)
Dept: HOSPITAL 75 - ER | Age: 52
Discharge: HOME | End: 2022-02-08
Payer: SELF-PAY

## 2022-02-08 VITALS — HEIGHT: 68.9 IN | WEIGHT: 277.78 LBS | BODY MASS INDEX: 41.14 KG/M2

## 2022-02-08 VITALS — SYSTOLIC BLOOD PRESSURE: 166 MMHG | DIASTOLIC BLOOD PRESSURE: 86 MMHG

## 2022-02-08 DIAGNOSIS — E11.65: Primary | ICD-10-CM

## 2022-02-08 DIAGNOSIS — I10: ICD-10-CM

## 2022-02-08 DIAGNOSIS — Z79.4: ICD-10-CM

## 2022-02-08 DIAGNOSIS — Z79.899: ICD-10-CM

## 2022-02-08 LAB
ALBUMIN SERPL-MCNC: 4.2 GM/DL (ref 3.2–4.5)
ALP SERPL-CCNC: 111 U/L (ref 40–136)
ALT SERPL-CCNC: 19 U/L (ref 0–55)
APTT BLD: 30 SEC (ref 24–35)
APTT PPP: YELLOW S
BACTERIA #/AREA URNS HPF: (no result) /HPF
BASOPHILS # BLD AUTO: 0.1 10^3/UL (ref 0–0.1)
BASOPHILS NFR BLD AUTO: 1 % (ref 0–10)
BILIRUB SERPL-MCNC: 1.3 MG/DL (ref 0.1–1)
BILIRUB UR QL STRIP: NEGATIVE
BUN/CREAT SERPL: 8
CALCIUM SERPL-MCNC: 9.5 MG/DL (ref 8.5–10.1)
CHLORIDE SERPL-SCNC: 100 MMOL/L (ref 98–107)
CO2 SERPL-SCNC: 21 MMOL/L (ref 21–32)
CREAT SERPL-MCNC: 1.19 MG/DL (ref 0.6–1.3)
D DIMER PPP FEU-MCNC: <= 0.27 UG/ML (ref 0–0.49)
EOSINOPHIL # BLD AUTO: 0.3 10^3/UL (ref 0–0.3)
EOSINOPHIL NFR BLD AUTO: 3 % (ref 0–10)
FIBRINOGEN PPP-MCNC: CLEAR MG/DL
GFR SERPLBLD BASED ON 1.73 SQ M-ARVRAT: 73 ML/MIN
GLUCOSE SERPL-MCNC: 510 MG/DL (ref 70–105)
GLUCOSE UR STRIP-MCNC: (no result) MG/DL
HCT VFR BLD CALC: 47 % (ref 40–54)
HGB BLD-MCNC: 15.6 G/DL (ref 13.3–17.7)
INR PPP: 1.1 (ref 0.8–1.4)
KETONES UR QL STRIP: NEGATIVE
LEUKOCYTE ESTERASE UR QL STRIP: NEGATIVE
LYMPHOCYTES # BLD AUTO: 2.6 10^3/UL (ref 1–4)
LYMPHOCYTES NFR BLD AUTO: 29 % (ref 12–44)
MANUAL DIFFERENTIAL PERFORMED BLD QL: NO
MCH RBC QN AUTO: 28 PG (ref 25–34)
MCHC RBC AUTO-ENTMCNC: 33 G/DL (ref 32–36)
MCV RBC AUTO: 84 FL (ref 80–99)
MONOCYTES # BLD AUTO: 0.9 10^3/UL (ref 0–1)
MONOCYTES NFR BLD AUTO: 10 % (ref 0–12)
NEUTROPHILS # BLD AUTO: 5.1 10^3/UL (ref 1.8–7.8)
NEUTROPHILS NFR BLD AUTO: 57 % (ref 42–75)
NITRITE UR QL STRIP: NEGATIVE
PH UR STRIP: 5.5 [PH] (ref 5–9)
PLATELET # BLD: 304 10^3/UL (ref 130–400)
PMV BLD AUTO: 10.7 FL (ref 9–12.2)
POTASSIUM SERPL-SCNC: 3.4 MMOL/L (ref 3.6–5)
PROT SERPL-MCNC: 7.7 GM/DL (ref 6.4–8.2)
PROT UR QL STRIP: NEGATIVE
PROTHROMBIN TIME: 14.2 SEC (ref 12.2–14.7)
RBC #/AREA URNS HPF: (no result) /HPF
SODIUM SERPL-SCNC: 135 MMOL/L (ref 135–145)
SP GR UR STRIP: 1.01 (ref 1.02–1.02)
SQUAMOUS #/AREA URNS HPF: (no result) /HPF
WBC # BLD AUTO: 8.9 10^3/UL (ref 4.3–11)
WBC #/AREA URNS HPF: (no result) /HPF

## 2022-02-08 PROCEDURE — 70450 CT HEAD/BRAIN W/O DYE: CPT

## 2022-02-08 PROCEDURE — 36415 COLL VENOUS BLD VENIPUNCTURE: CPT

## 2022-02-08 PROCEDURE — 84484 ASSAY OF TROPONIN QUANT: CPT

## 2022-02-08 PROCEDURE — 85025 COMPLETE CBC W/AUTO DIFF WBC: CPT

## 2022-02-08 PROCEDURE — 93005 ELECTROCARDIOGRAM TRACING: CPT

## 2022-02-08 PROCEDURE — 93041 RHYTHM ECG TRACING: CPT

## 2022-02-08 PROCEDURE — 82947 ASSAY GLUCOSE BLOOD QUANT: CPT

## 2022-02-08 PROCEDURE — 85730 THROMBOPLASTIN TIME PARTIAL: CPT

## 2022-02-08 PROCEDURE — 85610 PROTHROMBIN TIME: CPT

## 2022-02-08 PROCEDURE — 81000 URINALYSIS NONAUTO W/SCOPE: CPT

## 2022-02-08 PROCEDURE — 71045 X-RAY EXAM CHEST 1 VIEW: CPT

## 2022-02-08 PROCEDURE — 85379 FIBRIN DEGRADATION QUANT: CPT

## 2022-02-08 PROCEDURE — 80053 COMPREHEN METABOLIC PANEL: CPT

## 2022-02-08 NOTE — DIAGNOSTIC IMAGING REPORT
EXAMINATION: Chest 1 view



HISTORY: Weakness.



COMPARISON: 12/04/2021.



FINDINGS: 



The lungs are clear without edema or pneumonia. No pleural

effusion or pneumothorax. Heart size is normal.



IMPRESSION: 



1. Clear lungs.



Dictated by: 



  Dictated on workstation # [id number]

## 2022-02-08 NOTE — ED NEUROLOGICAL PROBLEM
General


Chief Complaint:  Neuro-Stroke Like Symptoms


Stated Complaint:  LEFT SIDE WEAKNESS


Nursing Triage Note:  


PT AMB TO [facility] OF WEAKNESS OF L UPPER AN LOWER EXT. PT HAS HX OF CVA X5 


FROM COVID APPROX 1 YEAR AGO. PT IS ALERT, SPEECH IS SLOW.


Source:  patient, family


Exam Limitations:  no limitations


 (HOUSTON HSIEH MD)





History of Present Illness


Date Seen by Provider:  2022


Time Seen by Provider:  16:20


Initial Comments


Here with report of increasing weakness over the last few days including areas 

of previous stroke on the left side.  Speech is slow but patient is alert and 

oriented and is able to describe current symptoms and history.  Apparently had 

multiple strokes after Covid infection.  He is typically incontinent of urine 

and stool.  He is usually able to move around with a walker but has had 

increasing left-sided weakness over the last 48 hours.  He is diabetic.  Denies 

fever, chills, sore throat, runny nose or cough.  Denies any other illness or 

symptoms other than the weakness and fatigue.


Timing/Duration:  increasing, other (2 days)


Severity:  moderate


Associated Symptoms:  fatigue; No fever/chills, No nausea/vomiting; slurred 

speech (Chronic), trouble walking, weakness (HOUSTON HSIEH MD)





Allergies and Home Medications


Allergies


Coded Allergies:  


     No Known Drug Allergies (Unverified , 13)





Patient Home Medication List


Home Medication List Reviewed:  Yes


 (HOUSTON HSIEH MD)


Lisinopril (Lisinopril) 20 Mg Tablet, 20 MG PO DAILY


   Prescribed by: TASNEEM MOELLER on 21


Metoprolol Tartrate (Metoprolol Tartrate) 25 Mg Tablet, 25 MG PO BID


   Prescribed by: TASNEEM MOELLER on 21


Discontinued Medications


Metformin HCl (Metformin HCl ER) 500 Mg Tab.er.24, 500 MG PO DAILY


   Discontinued Reason: No Longer Taking


   Prescribed by: TASNEEM MOELLER on 21


   Last Action: Discontinued


Prednisone (Prednisone) 20 Mg Tab, 20 MG PO DAILY


   Discontinued Reason: No Longer Taking


   Prescribed by: TASNEEM MOELELR on 21


   Last Action: Discontinued





Review of Systems


Review of Systems


Constitutional:  see HPI; No chills; fever


Ears, Nose, Mouth, Throat:  denies nose pain, denies throat pain


Respiratory:  No cough, No short of breath


Cardiovascular:  No chest pain, No edema


Gastrointestinal:  No nausea, No vomiting


Genitourinary:  No dysuria, No pain


Musculoskeletal:  see HPI


Skin:  no symptoms reported


Psychiatric/Neurological:  See HPI (HOUSTON HSIEH MD)





All Other Systems Reviewed


Negative Unless Noted:  Yes


 (HOUSTON HSIEH MD)





Past Medical-Social-Family Hx


Patient Social History


Tobacco Use?:  No


Substance use?:  No


Alcohol Use?:  No


Pt feels they are or have been:  No


 (HOUSTON HSIEH MD)





Immunizations Up To Date


First/Initial COVID19 Vaccinat:  2021


Second COVID19 Vaccination Misael:  2021


 (HOUSTON HSIEH MD)





Seasonal Allergies


Seasonal Allergies:  No


 (HOUSTON HSIEH MD)





Past Medical History


Surgery/Hospitalization HX:  


POST COVID, DIABETES, AND CROHNS DISEASE, STROKES


Surgeries:  Yes


Abdominal, Appendectomy, Bowel Surgery, Gallbladder


Respiratory:  No


Cardiac:  Yes


Hypertension


Neurological:  No


Reproductive Disorders:  No


Sexually Transmitted Disease:  No


HIV/AIDS:  No


Genitourinary:  No


Gastrointestinal:  Yes (APPY;JUAN; PARTIAL COLECTOMY)


Crohns Disease, Gall Bladder Disease


Musculoskeletal:  No


Endocrine:  Yes (NON-COMPLIANT-DOES NOT TAKE MEDS, CHECK GLUCOSE OR FOLLOW DIET)


Diabetes, Non-Insulin dep


HEENT:  Yes (2020--PERTONSILLAR ABSCESS/TRANSFERRED TO [place].NO SURGERY )


Cancer:  No


Psychosocial:  No


Integumentary:  No


Blood Disorders:  No


Adverse Reaction/Blood Tranf:  No


 (HOUSTON HSIEH MD)





Family Medical History


Reviewed Nursing Family Hx


 (HOUSTON HSIEH MD)


No Pertinent Family Hx


 (HOUSTON HSIEH MD)





Physical Exam


Vital Signs





Vital Signs - First Documented








 22





 16:13


 


Temp 35.9


 


Pulse 110


 


Resp 18


 


B/P (MAP) 166/86 (112)


 


Pulse Ox 92


 


O2 Delivery Room Air





 (PABLO BARROSO)


Vital Signs


Capillary Refill : Less Than 3 Seconds 


 (HOUSTON HSIEH MD)


Height, Weight, BMI


Height: '"


Weight: lbs. oz. kg; 41.00 BMI


Method:


General Appearance:  WD/WN, no apparent distress


HEENT:  PERRL/EOMI, pharynx normal


Neck:  full range of motion, supple


Respiratory:  lungs clear, normal breath sounds


Cardiovascular:  no murmur, tachycardia


Peripheral Pulses:  2+ Dorsalis Pedis (R), 2+ Left Dors-Pedis (L), 2+ Radial 

Pulses (R), 2+ Radial Pulses (L)


Gastrointestinal:  non tender, soft


Back:  normal inspection, no CVA tenderness, no vertebral tenderness


Extremities:  non-tender, no calf tenderness


Neurologic/Psychiatric:  alert, oriented x 3


Crainal Nerves:  abnormal speech


Coordination/Gait:  ABN nose to finger (L)


Motor/Sensory:  weak motor strength LUE, weak motor strength LLE


Skin:  normal color, warm/dry (HOUSTON HSIEH MD)





Stroke


NIH Stroke Scale Assessment





   Level of Consciousness: 0=Alert (0), Level of Consciousness-Questions: 

   0=Answers both month/age (0), LOC Commands: 0=Performs both tasks (0), Visual

   Fields: 0=No visual loss (0), Facial Movement (Facial Paresis): 0=Normal 

   symmetrical mnt (0), Motor Function-Arms Right: 0=No drift (0), Motor 

   Function-Arms Left: 2=Some effort/gravity (2), Motor Function-Legs Right: 

   0=No drift (0), Motor Function-Legs Left: 2=Some effort/gravity (2), Limb 

   Ataxia: 2=Present in two limbs (2), Sensory: 0=Normal:no loss (0), Best 

   Language: 0=No aphasia (0), Dysarthria: 1=Mild to moderate loss (1), 

   Extinction & Inattention: 0=No abnormality (0), Total: 





Progress/Results/Core Measures


Results/Orders


Lab Results





Laboratory Tests








Test


 22


16:16 22


17:45 22


19:18 Range/Units


 


 


White Blood Count


 8.9 


 


 


 4.3-11.0


10^3/uL


 


Red Blood Count


 5.54 H


 


 


 4.30-5.52


10^6/uL


 


Hemoglobin 15.6    13.3-17.7  g/dL


 


Hematocrit 47    40-54  %


 


Mean Corpuscular Volume 84    80-99  fL


 


Mean Corpuscular Hemoglobin 28    25-34  pg


 


Mean Corpuscular Hemoglobin


Concent 33 


 


 


 32-36  g/dL





 


Red Cell Distribution Width 12.6    10.0-14.5  %


 


Platelet Count


 304 


 


 


 130-400


10^3/uL


 


Mean Platelet Volume 10.7    9.0-12.2  fL


 


Immature Granulocyte % (Auto) 0     %


 


Neutrophils (%) (Auto) 57    42-75  %


 


Lymphocytes (%) (Auto) 29    12-44  %


 


Monocytes (%) (Auto) 10    0-12  %


 


Eosinophils (%) (Auto) 3    0-10  %


 


Basophils (%) (Auto) 1    0-10  %


 


Neutrophils # (Auto)


 5.1 


 


 


 1.8-7.8


10^3/uL


 


Lymphocytes # (Auto)


 2.6 


 


 


 1.0-4.0


10^3/uL


 


Monocytes # (Auto)


 0.9 


 


 


 0.0-1.0


10^3/uL


 


Eosinophils # (Auto)


 0.3 


 


 


 0.0-0.3


10^3/uL


 


Basophils # (Auto)


 0.1 


 


 


 0.0-0.1


10^3/uL


 


Immature Granulocyte # (Auto)


 0.0 


 


 


 0.0-0.1


10^3/uL


 


Prothrombin Time 14.2    12.2-14.7  SEC


 


INR Comment 1.1    0.8-1.4  


 


Activated Partial


Thromboplast Time 30 


 


 


 24-35  SEC





 


D-Dimer


 <= 0.27 


 


 


 0.00-0.49


UG/ML


 


Sodium Level 135    135-145  MMOL/L


 


Potassium Level 3.4 L   3.6-5.0  MMOL/L


 


Chloride Level 100      MMOL/L


 


Carbon Dioxide Level 21    21-32  MMOL/L


 


Anion Gap 14    5-14  MMOL/L


 


Blood Urea Nitrogen 10    7-18  MG/DL


 


Creatinine


 1.19 


 


 


 0.60-1.30


MG/DL


 


Estimat Glomerular Filtration


Rate 73 


 


 


  





 


BUN/Creatinine Ratio 8     


 


Glucose Level 510 *H     MG/DL


 


Calcium Level 9.5    8.5-10.1  MG/DL


 


Corrected Calcium 9.3    8.5-10.1  MG/DL


 


Total Bilirubin 1.3 H   0.1-1.0  MG/DL


 


Aspartate Amino Transf


(AST/SGOT) 12 


 


 


 5-34  U/L





 


Alanine Aminotransferase


(ALT/SGPT) 19 


 


 


 0-55  U/L





 


Alkaline Phosphatase 111      U/L


 


Troponin I < 0.028    <0.028  NG/ML


 


Total Protein 7.7    6.4-8.2  GM/DL


 


Albumin 4.2    3.2-4.5  GM/DL


 


Glucometer  391 H    MG/DL


 


Urine Color   YELLOW   


 


Urine Clarity   CLEAR   


 


Urine pH   5.5  5-9  


 


Urine Specific Gravity   1.010 L 1.016-1.022  


 


Urine Protein   NEGATIVE  NEGATIVE  


 


Urine Glucose (UA)   3+ H NEGATIVE  


 


Urine Ketones   NEGATIVE  NEGATIVE  


 


Urine Nitrite   NEGATIVE  NEGATIVE  


 


Urine Bilirubin   NEGATIVE  NEGATIVE  


 


Urine Urobilinogen   0.2  < = 1.0  MG/DL


 


Urine Leukocyte Esterase   NEGATIVE  NEGATIVE  


 


Urine RBC (Auto)   NEGATIVE  NEGATIVE  


 


Urine RBC   NONE   /HPF


 


Urine WBC   RARE   /HPF


 


Urine Squamous Epithelial


Cells 


 


 0-2 


  /HPF





 


Urine Crystals   NONE   /LPF


 


Urine Bacteria   TRACE   /HPF


 


Urine Casts   NONE   /LPF


 


Urine Mucus   NEGATIVE   /LPF


 


Urine Culture Indicated   NO   





 (PABLO BARROSO)


Medications Given in ED





Current Medications








 Medications  Dose


 Ordered  Sig/Iris


 Route  Start Time


 Stop Time Status Last Admin


Dose Admin


 


 Sodium Chloride  1,000 ml @ 


 0 mls/hr  Q0M ONCE


 IV  22 17:00


 22 17:01 DC 22 16:56


1,000 MLS/HR


 


 Sodium Chloride  1,000 ml @ 


 0 mls/hr  Q0M ONCE


 IV  22 18:00


 22 18:01 DC 22 18:03


1,000 MLS/HR





 (PABLO BARROSO)


Vital Signs/I&O











 22





 16:13 16:13


 


Temp  35.9


 


Pulse  110


 


Resp  18


 


B/P (MAP)  166/86 (112)


 


Pulse Ox 92 94


 


O2 Delivery Room Air 





 (PABLO BARROSO)








Blood Pressure Mean:                    112











Progress


Progress Note :  


Progress Note


Seen and evaluated.  IV, labs, UA, chest x-ray, CT head stroke protocol 

initiated.  Normal saline 1 L bolus ordered.  1700: Blood sugar noted to be 

quite elevated and regular insulin 15 units subcu ordered.  Pending UA.  Monitor

patient.  1800: Repeat normal saline 1 L bolus.  We are still trying to obtain 

UA.  Labs are otherwise unremarkable except for the hyperglycemia which we are 

treating now.  CT does not show a new findings.  I have discussed the case with 

Dr. Moeller and she will try to get a clinic appointment this week to initiate 

insulin therapy for hyperglycemia.  This was discussed with the patient and 

family as well.  They are very appreciative of that.  Monitor patient.


 (HOUSTON HSIEH MD)


Progress Note :  


   Time:  18:38


Progress Note


Assumed care of the patient at shift change.  Patient is set up to follow-up [facility] Dr. Moeller this week to initiate insulin therapy for hyperglycemia.  Patient's

sugar has responded to insulin.  Education on diabetes and diet have been shared

with the patient and will be reinforced by this provider.  Waiting on a 

urinalysis because of the elevated blood glucose and history of urinary 

frequency concern for UTI.


 (PABLO BARROSO)


Initial ECG Impression Date:  2022


Initial ECG Impression Time:  16:16


Initial ECG Rate:  108


Initial ECG Rhythm:  S.Tach


Comment


Sinus tachycardia with normal axis.  LVH noted.  No evidence of ST elevation MI.

 Similar to 2021.  Interpreted by me.


 (HOUSTON HSIEH MD)





Diagnostic Imaging





   Diagonstic Imaging:  CT


   Plain Films/CT/US/NM/MRI:  head


Comments


                 [facility] [place], [place]





NAME:   MURRAY PICKETT


MED REC#:   [id number]


ACCOUNT#:   [id number]


PT STATUS:   REG ER


:   1970


PHYSICIAN:   HOUSTON HSIEH MD


ADMIT DATE:   22/ER


                                   ***Draft***


Date of Exam:22





CT HEAD WO-R/O STROKE








PROCEDURE: 


CT head wo r/o stroke.





TECHNIQUE: 


Multiple contiguous axial images were obtained through the brain


without the use of intravenous contrast. Auto Exposure Controls


were utilized during the CT exam to meet ALARA standards for


radiation dose reduction. 





INDICATION:  


Left upper and lower extremity weakness. Previous history of CVA.





COMPARISON: 


2021.





FINDINGS:


The midline structures are not displaced. Several small old


lacunar sized infarcts are seen in the deep white matter with


encephalomalacia. There is no midline shift, mass effect,


hydrocephalus, or hemorrhage. Gray-white differentiation is


otherwise reasonably well maintained and there is no sulcal


effacement. There are no abnormal extra-axial fluid collections


or hemorrhage. The basilar cisterns appear normal. The sinuses,


orbits, and mastoid air cells are unremarkable. Bone windows show


no calvarial changes.





IMPRESSION:


There are a few scattered areas of old lacunar sized infarcts


with associated small areas of encephalomalacia. Overall, no


acute finding is identified by nonenhanced CT criteria. If


symptoms warrant or persist, an MRI would be of further value.





  Dictated on workstation # [id number]








Dict:   22


Trans:   22


[id number]





Interpreted by:     KENNY SALES MD


Electronically signed by:








   [facility]:  Xray


   Plain Films/CT/US/NM/MRI:  chest


Comments


                 [facility] [place], [place]





NAME:   MURRAY PICKETT


MED REC#:   [id number]


ACCOUNT#:   [id number]


PT STATUS:   REG ER


:   1970


PHYSICIAN:   HOUSTON HSIEH MD


ADMIT DATE:   22/ER


                                  ***Signed***


Date of Exam:22





CHEST 1 VIEW, AP/PA ONLY








EXAMINATION: Chest 1 view





HISTORY: Weakness.





COMPARISON: 2021.





FINDINGS: 





The lungs are clear without edema or pneumonia. No pleural


effusion or pneumothorax. Heart size is normal.





IMPRESSION: 





1. Clear lungs.





Dictated by: 





  Dictated on workstation # [id number]








Dict:   22


Trans:   22


[id number]





Interpreted by:     IMAN BRUNNER MD


Electronically signed by: IMAN BRUNNER MD 22


 (HOUSTON HSIEH MD)





Departure


Impression





   Primary Impression:  


   Uncontrolled diabetes mellitus with hyperglycemia, without long-term current 

   use of insulin


   Additional Impressions:  


   Left-sided weakness


   Dehydration


Disposition:  01 HOME, SELF-CARE


Condition:  Stable





Departure-Patient Inst.


Decision time for Depature:  19:34


 (PABLO BARROSO)


Referrals:  


[facility] (PCP/Family)


Primary Care Physician


Patient Instructions:  Dehydration, Adult ED, High Blood Sugar, Adult ED





Add. Discharge Instructions:  








All discharge instructions reviewed with patient and/or family. Voiced 

understanding.





Drink plenty of nonsugary fluids and eat a diet low in sugar and carbohydrates. 

You need to follow-up with the clinic this week for recheck and further 

evaluation.  You can work with them for possibly starting insulin.  I did 

discuss the case with Dr. Moeller and she will work to get your appointment this 

week.  Call the clinic tomorrow afternoon if he has not heard from them for 

appointment.  Let them know that the case was discussed with Dr. Moeller.  Return 

for worse pain, fever, vomiting, weakness, breathing problems or other concerns 

as needed.











HOUSTON HSIEH MD           2022 17:44


PABLO BARROSO                  2022 18:39

## 2022-02-08 NOTE — DIAGNOSTIC IMAGING REPORT
PROCEDURE: 

CT head wo r/o stroke.



TECHNIQUE: 

Multiple contiguous axial images were obtained through the brain

without the use of intravenous contrast. Auto Exposure Controls

were utilized during the CT exam to meet ALARA standards for

radiation dose reduction. 



INDICATION:  

Left upper and lower extremity weakness. Previous history of CVA.



COMPARISON: 

06/16/2021.



FINDINGS:

The midline structures are not displaced. Several small old

lacunar sized infarcts are seen in the deep white matter with

encephalomalacia. There is no midline shift, mass effect,

hydrocephalus, or hemorrhage. Gray-white differentiation is

otherwise reasonably well maintained and there is no sulcal

effacement. There are no abnormal extra-axial fluid collections

or hemorrhage. The basilar cisterns appear normal. The sinuses,

orbits, and mastoid air cells are unremarkable. Bone windows show

no calvarial changes.



IMPRESSION:

There are a few scattered areas of old lacunar sized infarcts

with associated small areas of encephalomalacia. Overall, no

acute finding is identified by nonenhanced CT criteria. If

symptoms warrant or persist, an MRI would be of further value.



Dictated by: 



  Dictated on workstation # [id number]

## 2022-07-09 ENCOUNTER — HOSPITAL ENCOUNTER (INPATIENT)
Dept: HOSPITAL 75 - ER | Age: 52
LOS: 14 days | DRG: 40 | End: 2022-07-23
Attending: INTERNAL MEDICINE | Admitting: INTERNAL MEDICINE
Payer: MEDICAID

## 2022-07-09 VITALS — BODY MASS INDEX: 42.39 KG/M2 | HEIGHT: 67.72 IN | WEIGHT: 276.46 LBS

## 2022-07-09 DIAGNOSIS — R29.91: ICD-10-CM

## 2022-07-09 DIAGNOSIS — K50.90: ICD-10-CM

## 2022-07-09 DIAGNOSIS — Z66: ICD-10-CM

## 2022-07-09 DIAGNOSIS — J18.9: ICD-10-CM

## 2022-07-09 DIAGNOSIS — K21.00: ICD-10-CM

## 2022-07-09 DIAGNOSIS — G81.91: ICD-10-CM

## 2022-07-09 DIAGNOSIS — I10: ICD-10-CM

## 2022-07-09 DIAGNOSIS — R29.716: ICD-10-CM

## 2022-07-09 DIAGNOSIS — R41.4: ICD-10-CM

## 2022-07-09 DIAGNOSIS — Z79.4: ICD-10-CM

## 2022-07-09 DIAGNOSIS — G47.33: ICD-10-CM

## 2022-07-09 DIAGNOSIS — J96.21: ICD-10-CM

## 2022-07-09 DIAGNOSIS — Z51.5: ICD-10-CM

## 2022-07-09 DIAGNOSIS — Z91.19: ICD-10-CM

## 2022-07-09 DIAGNOSIS — E11.9: ICD-10-CM

## 2022-07-09 DIAGNOSIS — K29.70: ICD-10-CM

## 2022-07-09 DIAGNOSIS — R13.10: ICD-10-CM

## 2022-07-09 DIAGNOSIS — R47.1: ICD-10-CM

## 2022-07-09 DIAGNOSIS — U09.9: ICD-10-CM

## 2022-07-09 DIAGNOSIS — Z79.82: ICD-10-CM

## 2022-07-09 DIAGNOSIS — T78.3XXA: ICD-10-CM

## 2022-07-09 DIAGNOSIS — I63.9: Primary | ICD-10-CM

## 2022-07-09 LAB
ALBUMIN SERPL-MCNC: 4 GM/DL (ref 3.2–4.5)
ALP SERPL-CCNC: 107 U/L (ref 40–136)
ALT SERPL-CCNC: 16 U/L (ref 0–55)
APAP SERPL-MCNC: < 10 UG/ML (ref 10–30)
APTT BLD: 30 SEC (ref 24–35)
APTT PPP: YELLOW S
BACTERIA #/AREA URNS HPF: NEGATIVE /HPF
BARBITURATES UR QL: NEGATIVE
BASOPHILS # BLD AUTO: 0.1 10^3/UL (ref 0–0.1)
BASOPHILS NFR BLD AUTO: 1 % (ref 0–10)
BENZODIAZ UR QL SCN: NEGATIVE
BILIRUB SERPL-MCNC: 1.1 MG/DL (ref 0.1–1)
BILIRUB UR QL STRIP: NEGATIVE
BUN/CREAT SERPL: 15
CALCIUM SERPL-MCNC: 9.2 MG/DL (ref 8.5–10.1)
CHLORIDE SERPL-SCNC: 103 MMOL/L (ref 98–107)
CO2 SERPL-SCNC: 21 MMOL/L (ref 21–32)
COCAINE UR QL: NEGATIVE
CREAT SERPL-MCNC: 0.96 MG/DL (ref 0.6–1.3)
D DIMER PPP FEU-MCNC: 0.28 UG/ML (ref 0–0.49)
EOSINOPHIL # BLD AUTO: 0.4 10^3/UL (ref 0–0.3)
EOSINOPHIL NFR BLD AUTO: 4 % (ref 0–10)
FIBRINOGEN PPP-MCNC: CLEAR MG/DL
GFR SERPLBLD BASED ON 1.73 SQ M-ARVRAT: 95 ML/MIN
GLUCOSE SERPL-MCNC: 271 MG/DL (ref 70–105)
GLUCOSE UR STRIP-MCNC: (no result) MG/DL
HCT VFR BLD CALC: 45 % (ref 40–54)
HGB BLD-MCNC: 14.6 G/DL (ref 13.3–17.7)
INR PPP: 1 (ref 0.8–1.4)
KETONES UR QL STRIP: NEGATIVE
LEUKOCYTE ESTERASE UR QL STRIP: NEGATIVE
LYMPHOCYTES # BLD AUTO: 2.4 10^3/UL (ref 1–4)
LYMPHOCYTES NFR BLD AUTO: 26 % (ref 12–44)
MANUAL DIFFERENTIAL PERFORMED BLD QL: NO
MCH RBC QN AUTO: 27 PG (ref 25–34)
MCHC RBC AUTO-ENTMCNC: 33 G/DL (ref 32–36)
MCV RBC AUTO: 84 FL (ref 80–99)
METHADONE UR QL SCN: NEGATIVE
MONOCYTES # BLD AUTO: 0.7 10^3/UL (ref 0–1)
MONOCYTES NFR BLD AUTO: 8 % (ref 0–12)
NEUTROPHILS # BLD AUTO: 5.8 10^3/UL (ref 1.8–7.8)
NEUTROPHILS NFR BLD AUTO: 62 % (ref 42–75)
NITRITE UR QL STRIP: NEGATIVE
OPIATES UR QL SCN: NEGATIVE
OXYCODONE UR QL: NEGATIVE
PH UR STRIP: 5.5 [PH] (ref 5–9)
PLATELET # BLD: 355 10^3/UL (ref 130–400)
PMV BLD AUTO: 10.4 FL (ref 9–12.2)
POTASSIUM SERPL-SCNC: 3.3 MMOL/L (ref 3.6–5)
PROPOXYPH UR QL: NEGATIVE
PROT SERPL-MCNC: 7.7 GM/DL (ref 6.4–8.2)
PROT UR QL STRIP: NEGATIVE
PROTHROMBIN TIME: 13.5 SEC (ref 12.2–14.7)
RBC #/AREA URNS HPF: (no result) /HPF
RENAL EPI CELLS #/AREA URNS HPF: (no result) /HPF
SALICYLATES SERPL-MCNC: < 5 MG/DL (ref 5–20)
SODIUM SERPL-SCNC: 140 MMOL/L (ref 135–145)
SP GR UR STRIP: 1.01 (ref 1.02–1.02)
SQUAMOUS #/AREA URNS HPF: (no result) /HPF
TRICYCLICS UR QL SCN: NEGATIVE
WBC # BLD AUTO: 9.4 10^3/UL (ref 4.3–11)
WBC #/AREA URNS HPF: (no result) /HPF

## 2022-07-09 PROCEDURE — 80076 HEPATIC FUNCTION PANEL: CPT

## 2022-07-09 PROCEDURE — 94660 CPAP INITIATION&MGMT: CPT

## 2022-07-09 PROCEDURE — 70496 CT ANGIOGRAPHY HEAD: CPT

## 2022-07-09 PROCEDURE — 80048 BASIC METABOLIC PNL TOTAL CA: CPT

## 2022-07-09 PROCEDURE — 84484 ASSAY OF TROPONIN QUANT: CPT

## 2022-07-09 PROCEDURE — 85379 FIBRIN DEGRADATION QUANT: CPT

## 2022-07-09 PROCEDURE — 87081 CULTURE SCREEN ONLY: CPT

## 2022-07-09 PROCEDURE — 36415 COLL VENOUS BLD VENIPUNCTURE: CPT

## 2022-07-09 PROCEDURE — 94760 N-INVAS EAR/PLS OXIMETRY 1: CPT

## 2022-07-09 PROCEDURE — 71045 X-RAY EXAM CHEST 1 VIEW: CPT

## 2022-07-09 PROCEDURE — 93306 TTE W/DOPPLER COMPLETE: CPT

## 2022-07-09 PROCEDURE — 70498 CT ANGIOGRAPHY NECK: CPT

## 2022-07-09 PROCEDURE — 94799 UNLISTED PULMONARY SVC/PX: CPT

## 2022-07-09 PROCEDURE — 51701 INSERT BLADDER CATHETER: CPT

## 2022-07-09 PROCEDURE — 33285 INSJ SUBQ CAR RHYTHM MNTR: CPT

## 2022-07-09 PROCEDURE — 81000 URINALYSIS NONAUTO W/SCOPE: CPT

## 2022-07-09 PROCEDURE — 80053 COMPREHEN METABOLIC PANEL: CPT

## 2022-07-09 PROCEDURE — 85610 PROTHROMBIN TIME: CPT

## 2022-07-09 PROCEDURE — 93005 ELECTROCARDIOGRAM TRACING: CPT

## 2022-07-09 PROCEDURE — 83735 ASSAY OF MAGNESIUM: CPT

## 2022-07-09 PROCEDURE — 93041 RHYTHM ECG TRACING: CPT

## 2022-07-09 PROCEDURE — 70450 CT HEAD/BRAIN W/O DYE: CPT

## 2022-07-09 PROCEDURE — 82805 BLOOD GASES W/O2 SATURATION: CPT

## 2022-07-09 PROCEDURE — 82947 ASSAY GLUCOSE BLOOD QUANT: CPT

## 2022-07-09 PROCEDURE — 80306 DRUG TEST PRSMV INSTRMNT: CPT

## 2022-07-09 PROCEDURE — 85025 COMPLETE CBC W/AUTO DIFF WBC: CPT

## 2022-07-09 PROCEDURE — 93880 EXTRACRANIAL BILAT STUDY: CPT

## 2022-07-09 PROCEDURE — 94640 AIRWAY INHALATION TREATMENT: CPT

## 2022-07-09 PROCEDURE — 96360 HYDRATION IV INFUSION INIT: CPT

## 2022-07-09 PROCEDURE — 80320 DRUG SCREEN QUANTALCOHOLS: CPT

## 2022-07-09 PROCEDURE — 85730 THROMBOPLASTIN TIME PARTIAL: CPT

## 2022-07-09 PROCEDURE — 80061 LIPID PANEL: CPT

## 2022-07-09 PROCEDURE — 80329 ANALGESICS NON-OPIOID 1 OR 2: CPT

## 2022-07-09 NOTE — ED NEUROLOGICAL PROBLEM
General


Chief Complaint:  Neuro-Stroke Like Symptoms


Stated Complaint:  POSS STROKE


Nursing Triage Note:  


TO ED VIA [facility] FROM HOME. PER EMS FAMILY STATES PT LAST KNOWN WELL 


TIME WAS 1H PTA. PT "FELL IN BATHROOM" PTA. POSSIBLE HX OF TIA'S DURING TIME OF 


HAVING COVID APPROX 1 YEAR AGO. BLOOD SUGAR 265 MG/DL EN ROUTE FOR EMS. PT AWAKE




ON ARRIVAL AND TAKEN IMMEDIATELY TO CT.


Source:  EMS


Exam Limitations:  clinical condition





History of Present Illness


Date Seen by Provider:  2022


Time Seen by Provider:  19:43


Initial Comments


Patient is a 52yo male, h/o COVID about 1.5y ago and multiple neurologic insults

during hospitalization for Covid at that time (TIA vs strokes).  He has had 

chronic debility since then with several ED visits for weakness and strokelike 

symptoms.  Apparently his sons were at home with him and he had a fall in the 

bathroom with increased weakness to the left side.  Unsure of LOC as sons are 

not here at this time for history.  The patient (with significantly slurred 

speech) is able to tell me that his wife is working in [place] - which is 

consistent with prior visits to our ER.  He does state he has a mild headache.  

Otherwise his speech is so slurred he is very difficult to understand.





GLobally he is able to move all 4 extremities (with weakness) and appears to 

have intact sensation throughout.  He appears to be cognizant of his 

surroundings.





ROS quite difficult due to difficulty with speech,


Timing/Duration:  1-3 hours


Severity:  moderate


Associated Symptoms:  nausea/vomiting (?), slurred speech, weakness





Allergies and Home Medications


Allergies


Coded Allergies:  


     No Known Drug Allergies (Unverified , 13)





Patient Home Medication List


Home Medication List Reviewed:  Yes


Lisinopril (Lisinopril) 20 Mg Tablet, 20 MG PO DAILY


   Prescribed by: TASNEEM MOELLER on 21 1117


Metoprolol Tartrate (Metoprolol Tartrate) 25 Mg Tablet, 25 MG PO BID


   Prescribed by: TASNEEM MOELLER on 21 1117





Review of Systems


Review of Systems


Constitutional:  see HPI


uanable to obtain due to patient's significant slurred speech





Past Medical-Social-Family Hx


Immunizations Up To Date


First/Initial COVID19 Vaccinat:  2021


Second COVID19 Vaccination Misael:  2021


Third COVID19 Vaccination Date:  2021





Seasonal Allergies


Seasonal Allergies:  No





Past Medical History


Surgery/Hospitalization HX:  


POST COVID, DIABETES, AND CROHNS DISEASE, STROKES


Surgeries:  Yes


Abdominal, Appendectomy, Bowel Surgery, Gallbladder


Respiratory:  No


Cardiac:  Yes


Hypertension


Neurological:  No


Reproductive Disorders:  No


Sexually Transmitted Disease:  No


HIV/AIDS:  No


Genitourinary:  No


Gastrointestinal:  Yes (APPY;JUAN; PARTIAL COLECTOMY)


Crohns Disease, Gall Bladder Disease


Musculoskeletal:  No


Endocrine:  Yes (NON-COMPLIANT-DOES NOT TAKE MEDS, CHECK GLUCOSE OR FOLLOW DIET)


Diabetes, Non-Insulin dep


HEENT:  Yes (2020--PERTONSILLAR ABSCESS/TRANSFERRED TO [facility].NO SURGERY )


Cancer:  No


Psychosocial:  No


Integumentary:  No


Blood Disorders:  No


Adverse Reaction/Blood Tranf:  No





Family Medical History


No Pertinent Family Hx





Physical Exam


Vital Signs





Vital Signs - First Documented








 22





 19:17 19:59


 


Temp 36.9 


 


Pulse 99 


 


Resp 16 


 


B/P (MAP) 151/84 (106) 


 


Pulse Ox 94 


 


O2 Delivery Room Air 


 


O2 Flow Rate  2.00





Capillary Refill : Less Than 3 Seconds


Height, Weight, BMI


Height: '"


Weight: lbs. oz. kg; 41.00 BMI


Method:


General Appearance:  WD/WN, no apparent distress


HEENT:  PERRL/EOMI, other (dry oral mucosa)


Respiratory:  lungs clear, normal breath sounds, no respiratory distress, no 

accessory muscle use


Cardiovascular:  regular rate, rhythm


Gastrointestinal:  normal bowel sounds, non tender, soft, other (obese)


Extremities:  normal inspection


Neurologic/Psychiatric:  no motor/sensory deficits, alert, normal mood/affect


Crainal Nerves:  PERRL, abnormal speech; No facial asymmetry, No facial droop, 

No facial paresthesias, No facial weakness, No gaze palsy, No hearing deficit 

(R), No hearing deficit (L), No tongue deviation to R, No tongue deviation to L


Coordination/Gait:  other (unable to particiapte)


Motor/Sensory:  No sensory deficit; weak motor strength RLE


Skin:  normal color, warm/dry





Progress/Results/Core Measures


Results/Orders


Lab Results





Laboratory Tests








Test


 22


19:10 22


19:35 22


19:42 22


19:45 Range/Units


 


 


White Blood Count


 9.4 


 


 


 


 4.3-11.0


10^3/uL


 


Red Blood Count


 5.33 


 


 


 


 4.30-5.52


10^6/uL


 


Hemoglobin 14.6     13.3-17.7  g/dL


 


Hematocrit 45     40-54  %


 


Mean Corpuscular Volume 84     80-99  fL


 


Mean Corpuscular Hemoglobin 27     25-34  pg


 


Mean Corpuscular Hemoglobin


Concent 33 


 


 


 


 32-36  g/dL





 


Red Cell Distribution Width 13.5     10.0-14.5  %


 


Platelet Count


 355 


 


 


 


 130-400


10^3/uL


 


Mean Platelet Volume 10.4     9.0-12.2  fL


 


Immature Granulocyte % (Auto) 0      %


 


Neutrophils (%) (Auto) 62     42-75  %


 


Lymphocytes (%) (Auto) 26     12-44  %


 


Monocytes (%) (Auto) 8     0-12  %


 


Eosinophils (%) (Auto) 4     0-10  %


 


Basophils (%) (Auto) 1     0-10  %


 


Neutrophils # (Auto)


 5.8 


 


 


 


 1.8-7.8


10^3/uL


 


Lymphocytes # (Auto)


 2.4 


 


 


 


 1.0-4.0


10^3/uL


 


Monocytes # (Auto)


 0.7 


 


 


 


 0.0-1.0


10^3/uL


 


Eosinophils # (Auto)


 0.4 H


 


 


 


 0.0-0.3


10^3/uL


 


Basophils # (Auto)


 0.1 


 


 


 


 0.0-0.1


10^3/uL


 


Immature Granulocyte # (Auto)


 0.0 


 


 


 


 0.0-0.1


10^3/uL


 


Sodium Level 140     135-145  MMOL/L


 


Potassium Level 3.3 L    3.6-5.0  MMOL/L


 


Chloride Level 103       MMOL/L


 


Carbon Dioxide Level 21     21-32  MMOL/L


 


Anion Gap 16 H    5-14  MMOL/L


 


Blood Urea Nitrogen 14     7-18  MG/DL


 


Creatinine


 0.96 


 


 


 


 0.60-1.30


MG/DL


 


Estimat Glomerular Filtration


Rate 95 


 


 


 


  





 


BUN/Creatinine Ratio 15      


 


Glucose Level 271 H      MG/DL


 


Calcium Level 9.2     8.5-10.1  MG/DL


 


Corrected Calcium 9.2     8.5-10.1  MG/DL


 


Total Bilirubin 1.1 H    0.1-1.0  MG/DL


 


Aspartate Amino Transf


(AST/SGOT) 12 


 


 


 


 5-34  U/L





 


Alanine Aminotransferase


(ALT/SGPT) 16 


 


 


 


 0-55  U/L





 


Alkaline Phosphatase 107       U/L


 


Troponin I < 0.028     <0.028  NG/ML


 


Total Protein 7.7     6.4-8.2  GM/DL


 


Albumin 4.0     3.2-4.5  GM/DL


 


Glucometer   234 H    MG/DL


 


Prothrombin Time    13.5  12.2-14.7  SEC


 


INR Comment    1.0  0.8-1.4  


 


Activated Partial


Thromboplast Time 


 


 


 30 


 24-35  SEC





 


D-Dimer


 


 


 


 0.28 


 0.00-0.49


UG/ML


 


Test


 22


19:55 


 


 


 Range/Units


 


 


Urine Color YELLOW      


 


Urine Clarity CLEAR      


 


Urine pH 5.5     5-9  


 


Urine Specific Gravity 1.010 L    1.016-1.022  


 


Urine Protein NEGATIVE     NEGATIVE  


 


Urine Glucose (UA) 3+ H    NEGATIVE  


 


Urine Ketones NEGATIVE     NEGATIVE  


 


Urine Nitrite NEGATIVE     NEGATIVE  


 


Urine Bilirubin NEGATIVE     NEGATIVE  


 


Urine Urobilinogen 1.0     < = 1.0  MG/DL


 


Urine Leukocyte Esterase NEGATIVE     NEGATIVE  


 


Urine RBC (Auto) NEGATIVE     NEGATIVE  


 


Urine RBC NONE      /HPF


 


Urine WBC 0-2      /HPF


 


Urine Squamous Epithelial


Cells NONE 


 


 


 


  /HPF





 


Urine Renal Epithelial Cells NONE      /HPF


 


Urine Crystals NONE      /LPF


 


Urine Bacteria NEGATIVE      /HPF


 


Urine Casts NONE      /LPF


 


Urine Mucus NEGATIVE      /LPF


 


Urine Culture Indicated NO      








My Orders





Orders - MORIAH ELLISON MD


Cbc With Automated Diff (22 19:38)


Protime With Inr (22 19:38)


Partial Thromboplastin Time (22 19:38)


Comprehensive Metabolic Panel (22 19:38)


Fibrin Degradation Products (22 19:38)


Troponin I Melva (22 19:38)


Ua Culture If Indicated (22 19:38)


Accucheck Stat ONCE (22 19:38)


Ed Iv/Invasive Line Start (22 19:38)


Ed Iv/Invasive Line Start (22 19:38)


Vital Signs Stroke Patient Q15M (22 19:38)


O2 (22 19:38)


Intake & Output 06,14,22 (22 19:38)


Monitor-Rhythm Ecg Trace Only (22 19:38)


Dysphagia Screening Tool Q10MX1 (22 19:38)


Post Thrombolytic Adminstratio (22 19:38)


Lipid Panel (7/10/22 06:00)


Ekg Tracing (22 19:40)


Straight Cath For Spec.-Adult (22 19:53)


Ns Iv 1000 Ml (Sodium Chloride 0.9%) (22 20:15)


Ct Angio Head/Neck (22 20:53)


Iohexol Injection (Omnipaque 350 Mg/Ml 1 (22 21:45)


Received Contrast (Hold Metformin- Contr (22 21:45)


Ns (Ivpb) (Sodium Chloride 0.9% Ivpb Bag (22 21:45)


Salicylate (22 22:21)


Acetaminophen (22 22:21)


Drug Screen Stat (Urine) (22 22:21)


Alcohol (22 22:21)





Medications Given in ED





Current Medications








 Medications  Dose


 Ordered  Sig/Iris


 Route  Start Time


 Stop Time Status Last Admin


Dose Admin


 


 Iohexol  75 ml  ONCE  ONCE


 IV  22 21:45


 22 22:00 DC 22 21:46


75 ML


 


 Sodium Chloride  100 ml  ONCE  ONCE


 IV  22 21:45


 22 22:00 DC 22 21:46


80 ML








Vital Signs/I&O











 22





 19:17 19:59


 


Temp 36.9 


 


Pulse 99 


 


Resp 16 


 


B/P (MAP) 151/84 (106) 


 


Pulse Ox 94 


 


O2 Delivery Room Air Nasal Cannula


 


O2 Flow Rate  2.00














Blood Pressure Mean:                    106











FSBG Bedside Testing


Finger Stick Blood Glucose:  234


Blood Glucose Action Taken:  DR ELLISON NOTIFIED





Progress


Progress Note #1:  


   Time:  09:29


Progress Note


family arrived (wife and son).  son present states that he was not present at 

the time of the episode at home - but he got the call about 6pm - he is not 

certain of exact time of onset - possibly around 5:30.  even before.  Wife is 

very concerned about specifically his ability to speak - he is much more slurred

than normal.  I re-examined him again - he is almost symmetrically weak in all 

extremities - much better able to overcome gravity and hold strength in UE than 

LE.  sensation intact.  Unable to assess limb ataxia due to his profound 

weakness.


Progress Note #2:  


   Time:  22:12


Progress Note


discussed with Dr Morel - JEFF stroke/neuro.  recommends MRI and outpatient 

workup for neurologic decline.  Not a candidate for any intervention (TPA or 

clot retrieval).


Progress Note #3:  


   Time:  22:20


Progress Note


Discussed plan of care as recommended by JEFF neuro with the wife.  She states 

that her  is telling her that he took 8 acetaminophen tablets at some 

point today but he cannot articulate at what time.  He is using his phone to try

to communicate with her.  He does have a neurologist that he sees at [facility], 

Dr. Ramirez.  He has had previous MRI at [facility].  We will admit for monitoring 

and add toxicology labs. He is on Plavix already.





discussed with Dr Castro - will admit to Step down


Initial ECG Impression Date:  2022


Initial ECG Impression Time:  19:40


Initial ECG Rate:  96


Initial ECG Rhythm:  Normal Sinus


Initial ECG Intervals








QTc 430


Comment


NO ectopy no ST elevation or depression observed





Diagnostic Imaging





   Diagonstic Imaging:  CT


   Plain Films/CT/US/NM/MRI:  head


Comments


                 [facility].


                                [place], [place]





NAME:   MURRAY PICKETT


MED REC#:   [id number]


ACCOUNT#:   [id number]


PT STATUS:   REG ER


:   1970


PHYSICIAN:   JOHN OJEDA


ADMIT DATE:   22/ER


                                   ***Draft***


Date of Exam:22





CT HEAD WO-R/O STROKE








PROCEDURE: CT head w/o r/o stroke.





TECHNIQUE: Multiple contiguous axial images were obtained through


the brain without the use of intravenous contrast. Auto Exposure


Controls were utilized during the CT exam to meet ALARA standards


for radiation dose reduction. 





INDICATION: Stroke.





COMPARISON: Prior examination from 2022.





FINDINGS: The ventricles and sulci are within normal limits.


There are a few lacunar infarcts. There is no hydrocephalus.


There is no midline shift. There is no mass, hemorrhage or


extra-axial fluid collection. The calvarium is intact. Sinuses


and mastoid air cells are clear.





IMPRESSION: Atrophy and some mild chronic microvascular ischemic


disease with a few lacunar infarcts, particularly in the left


basal ganglia and to a lesser degree right basal ganglia. No


other acute intracranial abnormality. If there is high clinical


concern for an acute CVA further evaluation with MRI should be


considered. 





  Dictated on workstation # [id number]








Dict:   22


Trans:   22


E [id number]





Interpreted by:     JEFF ARMSTRONG MD


Electronically signed by:





Departure


Communication (Admissions)


Time/Spoke to Admitting Phy:  22:30


Discussed with Dr Castro - Observation admission





Impression





   Primary Impression:  


   Generalized weakness


   Additional Impressions:  


   Dysarthria


   Diabetes


   Qualified Codes:  E11.9 - Type 2 diabetes mellitus without complications; 

   Z79.4 - Long term (current) use of insulin


Disposition:   ADMITTED AS INPATIENT


Condition:  Stable





Admissions


Decision to Admit Reason:  Admit from ER (General)


Decision to Admit/Date:  2022


Time/Decision to Admit Time:  22:31





Departure-Patient Inst.


Referrals:  


[facility] (PCP/Family)


Primary Care Physician











MORIAH ELLISON MD          2022 19:54

## 2022-07-09 NOTE — DIAGNOSTIC IMAGING REPORT
PROCEDURE: CT head w/o r/o stroke.



TECHNIQUE: Multiple contiguous axial images were obtained through

the brain without the use of intravenous contrast. Auto Exposure

Controls were utilized during the CT exam to meet ALARA standards

for radiation dose reduction. 



INDICATION: Stroke.



COMPARISON: Prior examination from 02/08/2022.



FINDINGS: The ventricles and sulci are within normal limits.

There are a few lacunar infarcts. There is no hydrocephalus.

There is no midline shift. There is no mass, hemorrhage or

extra-axial fluid collection. The calvarium is intact. Sinuses

and mastoid air cells are clear.



IMPRESSION: Atrophy and some mild chronic microvascular ischemic

disease with a few lacunar infarcts, particularly in the left

basal ganglia and to a lesser degree right basal ganglia. No

other acute intracranial abnormality. If there is high clinical

concern for an acute CVA further evaluation with MRI should be

considered. 



Dictated by: 



  Dictated on workstation # [id number]

## 2022-07-09 NOTE — DIAGNOSTIC IMAGING REPORT
PROCEDURE: CT angiography of the head and CT angiography of the

neck with and without contrast.



TECHNIQUE: Contiguous noncontrast images were obtained from the

skull base through the vertex. After intravenous contrast

administration, helical CT angiography of the neck was performed.

Source data was reformatted into 3D MIP projections. Delayed post

contrast acquisition was also obtained. Auto Exposure Controls

were utilized during the CT exam to meet ALARA standards for

radiation dose reduction. 



INDICATION: Right weakness and slurred speech.



FINDINGS: There is prominence of the ventricles and sulci. There

are lacunar infarcts in the basal ganglia bilaterally, left

greater than right. There is no hydrocephalus. There is no

midline shift. There is no intracranial mass, hemorrhage or

extra-axial fluid collection. There is no evidence of a large

vessel occlusion intracranially. Sinuses and mastoid air cells

are clear.



The globes and intraorbital structures are unremarkable. The

nasopharyngeal, oropharyngeal and hypopharyngeal tissues are

symmetrical and without mass effect. There appears to be a lymph

node in the left parotid gland. Parotid gland is otherwise

prominent but unremarkable. The submandibular glands are

unremarkable. The thyroid gland is normal in appearance. The lung

apices are clear.



The common carotid arteries and internal carotid arteries are

widely patent. There is no dissection, stenosis or occlusion.

Right vertebral artery appears to be hypoplastic. Left vertebral

artery is widely patent and fills the basilar artery. There is no

significant atherosclerotic plaque about the carotid

bifurcations, bilaterally. There are degenerative changes in the

cervical spine. Prevertebral soft tissues are within normal

limits. Epiglottis is unremarkable.



IMPRESSION:

1. No evidence of large vessel occlusion intracranially. There is

some atrophy and chronic microvascular ischemic disease with

lacunar infarcts in basal ganglia, bilaterally, left greater than

right.

2. Internal carotid arteries are widely patent. There is no

dissection, stenosis or occlusion. Additionally, there is no

significant plaque about either carotid bifurcation.

3. Hypoplastic right vertebral artery. Left vertebral artery is

widely patent.

4. Prominent parotid glands with a lymph node in the left parotid

gland.

5. No other acute abnormality in the neck. 



Dictated by: 



  Dictated on workstation # [id number]

## 2022-07-10 VITALS — DIASTOLIC BLOOD PRESSURE: 96 MMHG | SYSTOLIC BLOOD PRESSURE: 165 MMHG

## 2022-07-10 VITALS — DIASTOLIC BLOOD PRESSURE: 127 MMHG | SYSTOLIC BLOOD PRESSURE: 207 MMHG

## 2022-07-10 VITALS — SYSTOLIC BLOOD PRESSURE: 172 MMHG | DIASTOLIC BLOOD PRESSURE: 107 MMHG

## 2022-07-10 VITALS — DIASTOLIC BLOOD PRESSURE: 93 MMHG | SYSTOLIC BLOOD PRESSURE: 152 MMHG

## 2022-07-10 VITALS — SYSTOLIC BLOOD PRESSURE: 126 MMHG | DIASTOLIC BLOOD PRESSURE: 83 MMHG

## 2022-07-10 VITALS — DIASTOLIC BLOOD PRESSURE: 91 MMHG | SYSTOLIC BLOOD PRESSURE: 161 MMHG

## 2022-07-10 VITALS — DIASTOLIC BLOOD PRESSURE: 93 MMHG | SYSTOLIC BLOOD PRESSURE: 159 MMHG

## 2022-07-10 VITALS — DIASTOLIC BLOOD PRESSURE: 89 MMHG | SYSTOLIC BLOOD PRESSURE: 149 MMHG

## 2022-07-10 VITALS — SYSTOLIC BLOOD PRESSURE: 137 MMHG | DIASTOLIC BLOOD PRESSURE: 81 MMHG

## 2022-07-10 VITALS — SYSTOLIC BLOOD PRESSURE: 164 MMHG | DIASTOLIC BLOOD PRESSURE: 106 MMHG

## 2022-07-10 VITALS — SYSTOLIC BLOOD PRESSURE: 151 MMHG | DIASTOLIC BLOOD PRESSURE: 84 MMHG

## 2022-07-10 VITALS — DIASTOLIC BLOOD PRESSURE: 99 MMHG | SYSTOLIC BLOOD PRESSURE: 171 MMHG

## 2022-07-10 VITALS — SYSTOLIC BLOOD PRESSURE: 167 MMHG | DIASTOLIC BLOOD PRESSURE: 102 MMHG

## 2022-07-10 VITALS — DIASTOLIC BLOOD PRESSURE: 85 MMHG | SYSTOLIC BLOOD PRESSURE: 129 MMHG

## 2022-07-10 VITALS — SYSTOLIC BLOOD PRESSURE: 151 MMHG | DIASTOLIC BLOOD PRESSURE: 102 MMHG

## 2022-07-10 VITALS — SYSTOLIC BLOOD PRESSURE: 118 MMHG | DIASTOLIC BLOOD PRESSURE: 67 MMHG

## 2022-07-10 VITALS — DIASTOLIC BLOOD PRESSURE: 91 MMHG | SYSTOLIC BLOOD PRESSURE: 159 MMHG

## 2022-07-10 VITALS — SYSTOLIC BLOOD PRESSURE: 150 MMHG | DIASTOLIC BLOOD PRESSURE: 67 MMHG

## 2022-07-10 VITALS — DIASTOLIC BLOOD PRESSURE: 100 MMHG | SYSTOLIC BLOOD PRESSURE: 133 MMHG

## 2022-07-10 LAB
CHOLEST SERPL-MCNC: 109 MG/DL (ref ?–200)
HDLC SERPL-MCNC: 29 MG/DL (ref 40–60)
TRIGL SERPL-MCNC: 108 MG/DL (ref ?–150)
VLDLC SERPL CALC-MCNC: 22 MG/DL (ref 5–40)

## 2022-07-10 RX ADMIN — INSULIN ASPART SCH UNIT: 100 INJECTION, SOLUTION INTRAVENOUS; SUBCUTANEOUS at 16:47

## 2022-07-10 RX ADMIN — METOPROLOL TARTRATE SCH MG: 25 TABLET, FILM COATED ORAL at 21:05

## 2022-07-10 RX ADMIN — INSULIN ASPART SCH UNIT: 100 INJECTION, SOLUTION INTRAVENOUS; SUBCUTANEOUS at 11:00

## 2022-07-10 RX ADMIN — INSULIN ASPART SCH UNIT: 100 INJECTION, SOLUTION INTRAVENOUS; SUBCUTANEOUS at 21:13

## 2022-07-10 RX ADMIN — SODIUM CHLORIDE SCH MLS/HR: 900 INJECTION, SOLUTION INTRAVENOUS at 01:29

## 2022-07-10 RX ADMIN — METOPROLOL TARTRATE SCH MG: 25 TABLET, FILM COATED ORAL at 09:53

## 2022-07-10 RX ADMIN — ASPIRIN 81 MG CHEWABLE TABLET SCH MG: 81 TABLET CHEWABLE at 11:36

## 2022-07-10 RX ADMIN — INSULIN ASPART SCH UNIT: 100 INJECTION, SOLUTION INTRAVENOUS; SUBCUTANEOUS at 06:44

## 2022-07-10 RX ADMIN — CLOPIDOGREL BISULFATE SCH MG: 75 TABLET, FILM COATED ORAL at 11:36

## 2022-07-10 RX ADMIN — SODIUM CHLORIDE SCH MLS/HR: 900 INJECTION, SOLUTION INTRAVENOUS at 13:46

## 2022-07-10 NOTE — HISTORY & PHYSICAL-HOSPITALIST
History of Present Illness


HPI/Chief Complaint


Patient is a 50yo male, h/o COVID about 1.5y ago and multiple neurologic insults

during hospitalization for Covid at that time (TIA vs strokes).  He has had 

chronic debility since then with several ED visits for weakness and strokelike 

symptoms.  Apparently his sons were at home with him and he had a fall in the 

bathroom with increased weakness to the left side.  Unsure of LOC as sons are 

not here at this time for history.  The patient (with significantly slurred 

speech) is able to tell me that his wife is working in [place] - which is 

consistent with prior visits to our ER.  He does state he has a mild headache.  

Otherwise his speech is so slurred he is very difficult to understand.


Upon my arrival patient exhibited right neglect tongue was sticking out mildly 

swollen without erythema.  The patient is eyes were open and did orient to voice

he was slow to respond and very dysarthric unintelligible speech.  He was able 

to follow simple commands but when asked to use his right hand to touch my 

finger he only moved his left hand and was able to do so with good control of 

the left hand.  His wife came later in the day and reports that he had been 

seeing neurologist Dr. Loja had a rather extensive work-up was started on 

aspirin and Plavix but no apparent large vessel disease was noted.  He had an 

echocardiogram gram and there is been no evidence for atrial fibrillation.  He 

was having no neurologic issues previous to COVID infection in January 2021.  He

had been doing well at home until the night of his admission when his right leg 

apparently gave out he stumbled forward they helped him to the bed and again 

noted that he was dysarthric and his tongue appeared to be swollen.  There were 

no rash issues reported he was confused but there was no loss of consciousness 

and no reported pallor.  He had been feeling well up until that point.


Date Seen


7/10/22


Time Seen by a Provider:  07:30


Attending Physician


[place]


PCP


Admitting Physician:


Miguel Kahn MD 








Attending Physician:


Miguel Kahn MD


Referring Physician





Date of Admission


Jul 9, 2022 at 22:29





Home Medications & Allergies


Home Medications


Reviewed patient Home Medication Reconciliation performed by pharmacy medication

reconciliations technician and/or nursing.


Patients Allergies have been reviewed.





Allergies





Allergies


Coded Allergies


  No Known Drug Allergies (Unverified1/24/13)








Past Medical-Social-Family Hx


Immunizations Up To Date


First/Initial COVID19 Vaccinat:  01/2021


Second COVID19 Vaccination Misael:  01/2021





Seasonal Allergies


Seasonal Allergies:  No





Current Status


Primary Language:  English


Preferred Spoken Language:  English





Past Medical History


Surgeries:  Abdominal, Appendectomy, Bowel Surgery, Gallbladder


Hypertension


Sexually Transmitted Disease:  No


HIV/AIDS:  No


Crohns Disease, Gall Bladder Disease


Diabetes, Non-Insulin dep


Blood Disorders:  No


Adverse Reaction/Blood Tranf:  No





Family Medical History


No Pertinent Family Hx





Review of Systems


Constitutional:  see HPI





Physical Exam


Physical Exam


Vital Signs





Vital Signs - First Documented








 7/9/22 7/9/22 7/10/22





 19:17 19:59 00:20


 


Temp 36.9  


 


Pulse 99  


 


Resp 16  


 


B/P (MAP) 151/84 (106)  


 


Pulse Ox 94  


 


O2 Delivery Room Air  


 


O2 Flow Rate  2.00 


 


FiO2   21





Capillary Refill : Less Than 3 Seconds


Height, Weight, BMI


Height: '"


Weight: lbs. oz. kg; 41.00 BMI


Method:


General Appearance:  No Apparent Distress, Obese


HEENT:  PERRL/EOMI, Other ( tongue mildly edematous not significantly  firm no 

erythema no evidence for trauma no evidence for stridor)


Neck:  Full Range of Motion, Normal Inspection, Non Tender


Respiratory:  Chest Non Tender, Lungs Clear, Normal Breath Sounds, No Accessory 

Muscle Use, No Respiratory Distress


Cardiovascular:  Regular Rate, Rhythm, No Edema, No Gallop, No JVD, No Murmur, 

Normal Peripheral Pulses


Gastrointestinal:  Normal Bowel Sounds, No Organomegaly, No Pulsatile Mass, Non 

Tender, Soft


Extremity:  No Pedal Edema


Neurologic/Psychiatric:  Other (Mildly lethargic exhibiting right neglect with 

2+ right  strength 4+ left  strength not moving the right forearm upper 

arm or right lower extremity.  Babinski upgoing on the right but also on the 

left as well although much more prominent on the right)


Skin:  Normal Color, Warm/Dry





Results


Results/Procedures


Labs


Laboratory Tests


7/9/22 19:10








Patient resulted labs reviewed.





Assessment/Plan


Admission Diagnosis


1.  Acute left-sided CVA with right hemiparesis and dysarthria and reportedly 

right handed white male.  CTA of the head reveals no significant blockage 

reported extensive recent work-up negative.  The patient had been on aspirin and

Plavix for the past year according to wife.  The stroke team at  was consulted

with no further recommendations other than continuing aspirin and Plavix and 

obtaining an MRI when available which we will do with and without contrast in 

the morning.


2.  Possible angioedema of the tongue will hold lisinopril and continue to 

monitor in the unit for any evidence of airways obstruction.  For this reason we

will initiate n.p.o. status as well as a stroke with speech consultation for 

swallow evaluation in the morning.


3.  History of hypertension holding antihypertensive medication for now.


Admission Status:  Inpatient Order (span 2 midnights)


Reason for Inpatient Admission:  


See admission diagnosis











MIGUEL KAHN MD              Jul 10, 2022 12:47

## 2022-07-10 NOTE — TELE-ICU CONSULT
History of Present Illness


History of Present Illness


Date Seen by Provider:  Jul 10, 2022


Time Seen by Provider:  10:15


Date of Admission


(Tele-ICU Physician ,  consultation) 





Available chart/ vitals / labs / Images reviewed 


H&P is from ER notes 


Patient's information available about PMH, Shx, Fhx  allergy reviewed in EMR.  





ROS as per chart and RN report  


Now in ICU, hemodynamically stable  





Video assessment done using  teleICU camera, rest of exam as per RN  


Discussed with RN. 


( Unable to assess  his profound weakness via camera , patient is not talking to

RN 





Consultants:  





Hospital course: 


(7/10) 51/M- Weakness, dysarthria, mult- tia/?cva admits since covid mid 2021. 

Fell at home. Imaging w/ chronic infarcts, possible MRI if cont. neuro exam 

changes. ? new cva. // asa, plavix, insulin.





A/P 


Suspected CVA- as per notes - was discussed by ER MD with [facility] neuro "  Dr Morel

- [facility] stroke/neuro.  recommends MRI and outpatient workup for neurologic decline.

 Not a candidate for any intervention (TPA or clot retrieval)."


- await MRI


- order neuro check q 4 - to follow 


- keep -180  presuming a new strole - if none , will controll BP more 

agressively


- as per PCP - ? needs speach eval 


- cont Plavix 





HTN 


 - as above 








Lines :    (Central Line Necessity Reviewed) 


Nuñez: void


OG: 


Nutrition:  





Analgesia:  


Anxiety/ delirium  


VTE Prophylaxis:  scd





Stress Ulcer Prophylaxis:  na


Glycemic Control: 





 





Plans in collaboration with  bedside consultants and IM MDs. 


Discussed with RN to reach out if any questions or concerns 


A total of 25  minutes of critical care time was devoted to this patient today, 

required to treat and/or prevent further deterioration of critical care 

condition ( as above ) .





Allergies and Home Medications


Allergies


Coded Allergies:  


     No Known Drug Allergies (Unverified , 1/24/13)





Home Medications


Lisinopril 20 Mg Tablet, 20 MG PO DAILY


   Prescribed by: TASNEEM MOELLER on 1/19/21 1117


Metoprolol Tartrate 25 Mg Tablet, 25 MG PO BID


   Prescribed by: TASNEEM MOELLER on 1/19/21 1117





Past Medical/Social/Family Hx


Immunizations Up To Date


First/Initial COVID19 Vaccinat:  01/2021


Second COVID19 Vaccination Misael:  01/2021





Current Status


Primary Language:  English


Preferred Spoken Language:  English





Review of Systems


Constitutional:  other





Focused Exam


Height, Weight, BMI


Height: '"


Weight: lbs. oz. kg; 41.00 BMI


Method:





Exam


Exam


Patient acknowledged, consented, and participated in this virtual visit which 

was conducted using real time audio/video


Vital Signs








  Date Time  Temp Pulse Resp B/P (MAP) Pulse Ox O2 Delivery O2 Flow Rate FiO2


 


7/10/22 08:00  104 21 159/91 (113) 95 Nasal Cannula 3.00 


 


7/10/22 07:00  101      


 


7/10/22 06:00  114 26 207/127 (153) 91 Nasal Cannula 3.00 


 


7/10/22 05:00  107 26 172/107 (128) 93 Nasal Cannula 3.00 


 


7/10/22 04:39 36.8       


 


7/10/22 04:00  111 24 159/93 (115) 90 Nasal Cannula 3.00 


 


7/10/22 03:00  89 25 126/83 (97) 93 Nasal Cannula 3.00 


 


7/10/22 02:30  87 25 149/89 (109) 95 Nasal Cannula 3.00 


 


7/10/22 02:00  86 32 165/96 (119) 94 Nasal Cannula 3.00 


 


7/10/22 01:30  94 25 151/102 (118) 98 Nasal Cannula 3.00 


 


7/10/22 01:00  84 27 137/81 (99) 97 Nasal Cannula 3.00 


 


7/10/22 00:51  92      


 


7/10/22 00:36  96      


 


7/10/22 00:30  93 26 129/85 (100) 94 Nasal Cannula 3.00 


 


7/10/22 00:20 36.9 99   94   21


 


7/10/22 00:15  101 21 133/100 (111) 94 Nasal Cannula 3.00 


 


7/10/22 00:00  98 16 164/106 (125) 96 Nasal Cannula 3.00 


 


7/10/22 00:00 36.2       


 


7/9/22 23:15 36.9 98 16 161/116 97 Nasal Cannula 2.00 


 


7/9/22 19:59      Nasal Cannula 2.00 


 


7/9/22 19:17 36.9 99 16 151/84 (106) 94 Room Air  














I & O 


 


 7/10/22





 07:00


 


Intake Total 1000 ml


 


Balance 1000 ml








Height & Weight


Height: '"


Weight: lbs. oz. kg; 41.00 BMI


Method:


General Appearance:  No Apparent Distress


Capillary Refill:  Less Than 3 Seconds


Gastrointestinal:  normal bowel sounds, non tender, soft, other (obese)





Results


Lab


Laboratory Tests


7/9/22 19:10











Assessment/Plan


Assessment/Plan


1











VENKAT SLOAN MD         Jul 10, 2022 10:15

## 2022-07-11 VITALS — SYSTOLIC BLOOD PRESSURE: 124 MMHG | DIASTOLIC BLOOD PRESSURE: 114 MMHG

## 2022-07-11 VITALS — DIASTOLIC BLOOD PRESSURE: 99 MMHG | SYSTOLIC BLOOD PRESSURE: 129 MMHG

## 2022-07-11 VITALS — DIASTOLIC BLOOD PRESSURE: 87 MMHG | SYSTOLIC BLOOD PRESSURE: 151 MMHG

## 2022-07-11 VITALS — DIASTOLIC BLOOD PRESSURE: 100 MMHG | SYSTOLIC BLOOD PRESSURE: 157 MMHG

## 2022-07-11 VITALS — SYSTOLIC BLOOD PRESSURE: 170 MMHG | DIASTOLIC BLOOD PRESSURE: 109 MMHG

## 2022-07-11 VITALS — SYSTOLIC BLOOD PRESSURE: 140 MMHG | DIASTOLIC BLOOD PRESSURE: 77 MMHG

## 2022-07-11 VITALS — SYSTOLIC BLOOD PRESSURE: 160 MMHG | DIASTOLIC BLOOD PRESSURE: 93 MMHG

## 2022-07-11 VITALS — SYSTOLIC BLOOD PRESSURE: 157 MMHG | DIASTOLIC BLOOD PRESSURE: 93 MMHG

## 2022-07-11 VITALS — SYSTOLIC BLOOD PRESSURE: 148 MMHG | DIASTOLIC BLOOD PRESSURE: 95 MMHG

## 2022-07-11 LAB
ALBUMIN SERPL-MCNC: 3.6 GM/DL (ref 3.2–4.5)
ALP SERPL-CCNC: 81 U/L (ref 40–136)
ALT SERPL-CCNC: 11 U/L (ref 0–55)
BASOPHILS # BLD AUTO: 0.1 10^3/UL (ref 0–0.1)
BASOPHILS NFR BLD AUTO: 1 % (ref 0–10)
BILIRUB DIRECT SERPL-MCNC: 0.6 MG/DL (ref 0–0.3)
BILIRUB SERPL-MCNC: 1.5 MG/DL (ref 0.1–1)
BUN/CREAT SERPL: 16
CALCIUM SERPL-MCNC: 8.8 MG/DL (ref 8.5–10.1)
CHLORIDE SERPL-SCNC: 107 MMOL/L (ref 98–107)
CO2 SERPL-SCNC: 22 MMOL/L (ref 21–32)
CREAT SERPL-MCNC: 0.68 MG/DL (ref 0.6–1.3)
EOSINOPHIL # BLD AUTO: 0.2 10^3/UL (ref 0–0.3)
EOSINOPHIL NFR BLD AUTO: 2 % (ref 0–10)
GFR SERPLBLD BASED ON 1.73 SQ M-ARVRAT: 112 ML/MIN
GLUCOSE SERPL-MCNC: 147 MG/DL (ref 70–105)
HCT VFR BLD CALC: 43 % (ref 40–54)
HGB BLD-MCNC: 13.2 G/DL (ref 13.3–17.7)
LYMPHOCYTES # BLD AUTO: 1.9 10^3/UL (ref 1–4)
LYMPHOCYTES NFR BLD AUTO: 19 % (ref 12–44)
MANUAL DIFFERENTIAL PERFORMED BLD QL: NO
MCH RBC QN AUTO: 27 PG (ref 25–34)
MCHC RBC AUTO-ENTMCNC: 31 G/DL (ref 32–36)
MCV RBC AUTO: 88 FL (ref 80–99)
MONOCYTES # BLD AUTO: 1 10^3/UL (ref 0–1)
MONOCYTES NFR BLD AUTO: 10 % (ref 0–12)
NEUTROPHILS # BLD AUTO: 7.1 10^3/UL (ref 1.8–7.8)
NEUTROPHILS NFR BLD AUTO: 69 % (ref 42–75)
PLATELET # BLD: 329 10^3/UL (ref 130–400)
PMV BLD AUTO: 10.4 FL (ref 9–12.2)
POTASSIUM SERPL-SCNC: 3.4 MMOL/L (ref 3.6–5)
PROT SERPL-MCNC: 6.8 GM/DL (ref 6.4–8.2)
SODIUM SERPL-SCNC: 142 MMOL/L (ref 135–145)
WBC # BLD AUTO: 10.3 10^3/UL (ref 4.3–11)

## 2022-07-11 PROCEDURE — 5A0945A ASSISTANCE WITH RESPIRATORY VENTILATION, 24-96 CONSECUTIVE HOURS, HIGH NASAL FLOW/VELOCITY: ICD-10-PCS | Performed by: INTERNAL MEDICINE

## 2022-07-11 RX ADMIN — ENOXAPARIN SODIUM SCH MG: 100 INJECTION SUBCUTANEOUS at 12:09

## 2022-07-11 RX ADMIN — INSULIN ASPART SCH UNIT: 100 INJECTION, SOLUTION INTRAVENOUS; SUBCUTANEOUS at 15:31

## 2022-07-11 RX ADMIN — METOPROLOL TARTRATE SCH MG: 25 TABLET, FILM COATED ORAL at 11:32

## 2022-07-11 RX ADMIN — FAMOTIDINE SCH MG: 20 TABLET, FILM COATED ORAL at 21:22

## 2022-07-11 RX ADMIN — SODIUM CHLORIDE SCH MLS/HR: 900 INJECTION, SOLUTION INTRAVENOUS at 16:22

## 2022-07-11 RX ADMIN — METOPROLOL TARTRATE SCH MG: 1 INJECTION, SOLUTION INTRAVENOUS at 18:03

## 2022-07-11 RX ADMIN — INSULIN ASPART SCH UNIT: 100 INJECTION, SOLUTION INTRAVENOUS; SUBCUTANEOUS at 11:20

## 2022-07-11 RX ADMIN — INSULIN ASPART SCH UNIT: 100 INJECTION, SOLUTION INTRAVENOUS; SUBCUTANEOUS at 06:18

## 2022-07-11 RX ADMIN — ASPIRIN 81 MG CHEWABLE TABLET SCH MG: 81 TABLET CHEWABLE at 11:32

## 2022-07-11 RX ADMIN — CLOPIDOGREL BISULFATE SCH MG: 75 TABLET, FILM COATED ORAL at 11:32

## 2022-07-11 RX ADMIN — SODIUM CHLORIDE SCH MLS/HR: 900 INJECTION, SOLUTION INTRAVENOUS at 03:04

## 2022-07-11 RX ADMIN — INSULIN ASPART SCH UNIT: 100 INJECTION, SOLUTION INTRAVENOUS; SUBCUTANEOUS at 21:23

## 2022-07-11 NOTE — CONSULTATION-CARDIOLOGY
HPI-Cardiology


Cardiology Consultation:


Date of Consultation


7/11/22


Time Seen by a Provider:  20:10


Date of Admission





Attending Physician


[facility]


Admitting Physician


Admitting Physician:


Miguel Castro MD 








Attending Physician:


Gilda Esquivel DO


Consulting Physician


VICKY ROBERTS MD, MA, FACP, FACC, [facility], CCDS





HPI:


Chief Complaint:


CVA


Mr. Watts is a 51 yr old male admitted to [facility] from the ED with CVA.  He is 

unable to converse.  He does nod his head in answer to questions.  Spouse is at 

the bedside.  She reports he lives at home with her and is mobile with use of a 

cane at times.  He had gotten up yesterday to use the bathroom and began to 

fall.  He grabbed a coat hook and it pulled out of the wall resulting in a fall.

 His sons were able to get him up and then got him into the shower to clean up. 

He spouse reports the sons helped him to his recliner and called EMS.  He 

developed slurred speech, facial droop, tongue swelling and right sided 

weakness.  He has chronic left sided weakness from a previous stroke.  He is 

unable to move his RUE or RLE. He nods to no chest pain, SOB, palpitations.  His

wife reports he is having difficulty swallowing today.  He does have sleep 

apnea, but is not using a CPAP.  He follows with neurology services at [facility], Dr. Ramirez.





PMH-Social-Family Hx


Patient Social History


2nd Hand Smoke Exposure:  No





Past Medical History


PMH


As described under Assessment.





Family Medical History


Family Medical History:  


Pt spouse reports he has no known family h/o CAD





Allergies and Home Medications


Allergies


Coded Allergies:  


     ACE Inhibitors (Verified  Allergy, Unknown, 7/11/22)


   angioedema





Patient Home Medication List


Home Medication List Reviewed:  Yes


Aspirin (Aspirin EC) 81 Mg Tablet., 81 MG PO DAILY, (Reported)


   Entered as Reported by: TOMASA RALPH on 7/11/22 1003


   Last Action: Continued


Atorvastatin Calcium (Atorvastatin Calcium) 40 Mg Tablet, 40 MG PO DAILY, 

(Reported)


   Entered as Reported by: TOMASA RALPH on 7/11/22 1003


   Last Action: Continued


Clopidogrel Bisulfate (Clopidogrel) 75 Mg Tablet, 75 MG PO DAILY, (Reported)


   Entered as Reported by: TOMASA RALPH on 7/11/22 1003


   Last Action: Continued


Cyanocobalamin (Vitamin B-12) (Vitamin B-12) 50 Mcg Tablet, 50 MCG PO DAILY, 

(Reported)


   Entered as Reported by: TOMASA RALPH on 7/11/22 1003


   Last Action: Converted


Dapagliflozin Propanediol (Farxiga) 10 Mg Tablet, 10 MG PO HS, (Reported)


   Entered as Reported by: TOMASA RALPH on 7/11/22 1003


   Last Action: Converted


Famotidine (Famotidine) 20 Mg Tablet, 20 MG PO BID, (Reported)


   Entered as Reported by: TOMASA RALPH on 7/11/22 1003


   Last Action: Continued


Insulin Detemir (Levemir Flextouch) 100 Unit/Ml (3 Ml) Insuln.pen, 18 UNITS SC 

BID, (Reported)


   Entered as Reported by: TOMASA RALPH on 7/11/22 1003


   Last Action: Converted


Liraglutide (Victoza 3-Jose) 0.6 Mg/0.1 Ml (18 Mg/3 Ml) Pen.injctr, 1.8 MG SQ HS,

(Reported)


   Entered as Reported by: TOMASA RALPH on 7/11/22 1003


   Last Action: Converted


Lisinopril (Lisinopril) 20 Mg Tablet, 20 MG PO DAILY, (Reported)


   Entered as Reported by: TOMASA RALPH on 7/11/22 1003


   Last Action: Held


Discontinued Medications


Lisinopril (Lisinopril) 20 Mg Tablet, 20 MG PO DAILY


   Discontinued Reason: No Longer Taking


   Prescribed by: TASNEEM MOELLER on 1/19/21 1117


   Last Action: Discontinued


Metoprolol Tartrate (Metoprolol Tartrate) 25 Mg Tablet, 25 MG PO BID


   Discontinued Reason: No Longer Taking


   Prescribed by: TASNEEM MOELLER on 1/19/21 1117


   Last Action: Discontinued





Physical Exam-Cardiology


Physical Exam


Vital Signs/I&O











 7/11/22 7/11/22 7/11/22 7/11/22





 11:56 12:00 13:00 16:00


 


Temp  36.6  35.2


 


Pulse 105  101 95


 


Resp 20   16


 


B/P (MAP) 151/87 (108)   160/93 (115)


 


Pulse Ox 94   95


 


O2 Delivery Vapotherm   Vapotherm


 


O2 Flow Rate 20.00   25.00





 50.00   80.00


 


    





 7/11/22   





 20:00   


 


Temp 36.0   


 


Pulse 96   


 


Resp 24   


 


B/P (MAP) 129/99 (109)   


 


Pulse Ox 95   


 


O2 Delivery Vapotherm   


 


O2 Flow Rate 30.00   





 80.00   














 7/10/22





 23:59


 


Intake Total 0 ml


 


Output Total 725 ml


 


Balance -725 ml





Capillary Refill : Less Than 3 Seconds


Constitutional:  well-developed, other (non-verbal - nods head slowly in 

response to questions)


Neck:  No carotid bruit; carotid pulses are 2 + bilaterally


Respiratory:  No accessory muscle use, No respiratory distress; chest expansion 

is symmetric, chest is bilaterally symmetric, lungs clear to auscultation


Cardiovascular:  regular rate-rhythm; No JVD; tachycardia, S1 and S2


Gastrointestinal:  No tender; soft, round, audible bowel sounds


Extremities:  no lower extremity edema bilateral


Neurologic/Psychiatric:  other (RUE and RLE flaccid, facial droop right sided; 

LLE and LUE chronically weak 4/5)


Skin:  No rash on exposed areas, No ulcerations on exposed areas





Data Review


Labs


Laboratory Tests


7/10/22 21:07: Glucometer 135H


7/11/22 05:30: 


White Blood Count 10.3, Red Blood Count 4.87, Hemoglobin 13.2L, Hematocrit 43, 

Mean Corpuscular Volume 88, Mean Corpuscular Hemoglobin 27, Mean Corpuscular 

Hemoglobin Concent 31L, Red Cell Distribution Width 13.6, Platelet Count 329, 

Mean Platelet Volume 10.4, Immature Granulocyte % (Auto) 0, Neutrophils (%) 

(Auto) 69, Lymphocytes (%) (Auto) 19, Monocytes (%) (Auto) 10, Eosinophils (%) 

(Auto) 2, Basophils (%) (Auto) 1, Neutrophils # (Auto) 7.1, Lymphocytes # (Auto)

1.9, Monocytes # (Auto) 1.0, Eosinophils # (Auto) 0.2, Basophils # (Auto) 0.1, 

Immature Granulocyte # (Auto) 0.0, Sodium Level 142, Potassium Level 3.4L, 

Chloride Level 107, Carbon Dioxide Level 22, Anion Gap 13, Blood Urea Nitrogen 

11, Creatinine 0.68, Estimat Glomerular Filtration Rate 112, BUN/Creatinine 

Ratio 16, Glucose Level 147H, Calcium Level 8.8, Total Bilirubin 1.5H, Direct 

Bilirubin 0.6H, Indirect Bilirubin 0.9, Aspartate Amino Transf (AST/SGOT) 12, 

Alanine Aminotransferase (ALT/SGPT) 11, Alkaline Phosphatase 81, Total Protein 

6.8, Albumin 3.6


7/11/22 11:04: Glucometer 158H


7/11/22 16:12: Glucometer 124H


7/11/22 20:33: Glucometer 116H








A/P-Cardiology


Assessment/Admission Diagnosis


CVA with right sided hemiparesis, aphasia


- reported h/o several CVA/TIA's following COVID infection in 2021 with left 

sided weakness - presents this time with righ sided 

hemiparesis/aphasia/dysphagia


- The stroke team at  was consulted with no further recommendations other than

continuing aspirin and Plavix and obtaining an MRI


- Management per stroke team


- Follows with Dr. Ramirez of neurology services at [facility]


- Carotid u/s7-11-22:  Very limited exam. The right carotid artery shows no 

hemodynamic disease with minimal plaquing.  Left carotid system and vertebral 

arteries were not adequately evaluated due to patient's pain and position. 

Previous CT angiography of the neck on 07/09/2022 suggested


the carotid and vertebral arteries to be widely patent without significant 

stenosis.





?Angioedema


- Lisinopril stopped





Fall at home





Sleep apnea


- non-complaint with sleep study





? aspiration


- NPO





HTN





Crohn's dz


- h/o colectomy





DM 2





Discussion and Recomendations


CVA


- management per stroke team - please see recs per [facility] as above


- source undetermined - consider implant of ILR to r/o possible a-fib/flutter as

cause


BP not well controlled


- add BB


Monitor lab closely


Further recs will be based on hospital course


We would like to thank Dr. Esquivel for this consult











VICKY ROBERTS MD FACP FAC CCDS   Jul 11, 2022 21:07

## 2022-07-11 NOTE — CONSULTATION-CARDIOLOGY
HPI-Cardiology


Cardiology Consultation:


Date of Consultation


22


Time Seen by a Provider:  10:00


Date of Admission


7-10-22


Attending Physician


[facility]


Admitting Physician


Admitting Physician:


Miguel Castro MD 








Attending Physician:


Gilda Hernadez DO


Consulting Physician


Preet Darby MD





HPI:


Chief Complaint:


CVA


Mr. Watts is a 51 yr old male admitted to [facility] from the ED with CVA.  He is 

unable to converse.  He does nod his head in answer to questions.  Spouse is at 

the bedside.  She reports he lives at home with her and is mobile with use of a 

cane at times.  He had gotten up yesterday to use the bathroom and began to 

fall.  He grabbed a coat hook and it pulled out of the wall resulting in a fall.

 His sons were able to get him up and then got him into the shower to clean up. 

He spouse reports the sons helped him to his recliner and called EMS.  He 

developed slurred speech, facial droop, tongue swelling and right sided weakness

.  He has chronic left sided weakness from a previous stroke.  He is unable to 

move his RUE or RLE. He nods to no chest pain, SOB, palpitations.  His wife 

reports he is having difficulty swallowing today.  He does have sleep apnea, but

is not using a CPAP.  He follows with neurology services at [facility], Dr. Ramirez.





Review of Systems-Cardiology


Review of Systems


Other comments


ROS to the extent it could be obtained is as per HPI





PMH-Social-Family Hx


Patient Social History


2nd Hand Smoke Exposure:  No





Past Medical History


PMH


As described under Assessment.





Family Medical History


Family Medical History:  


Pt spouse reports he has no known family h/o CAD





Allergies and Home Medications


Allergies


Coded Allergies:  


     ACE Inhibitors (Verified  Allergy, Unknown, 22)


   angioedema





Patient Home Medication List


Aspirin (Aspirin EC) 81 Mg Tablet., 81 MG PO DAILY, (Reported)


   Entered as Reported by: TOMASA RALPH on 22 1003


   Last Action: Continued


Atorvastatin Calcium (Atorvastatin Calcium) 40 Mg Tablet, 40 MG PO DAILY, 

(Reported)


   Entered as Reported by: TOMASA RALPH on 22 1003


   Last Action: Continued


Clopidogrel Bisulfate (Clopidogrel) 75 Mg Tablet, 75 MG PO DAILY, (Reported)


   Entered as Reported by: TOMASA RALPH on 22


   Last Action: Continued


Cyanocobalamin (Vitamin B-12) (Vitamin B-12) 50 Mcg Tablet, 50 MCG PO DAILY, 

(Reported)


   Entered as Reported by: TOMASA RALPH on 22


   Last Action: Converted


Dapagliflozin Propanediol (Farxiga) 10 Mg Tablet, 10 MG PO HS, (Reported)


   Entered as Reported by: TOMASA RALPH on 22


   Last Action: Converted


Famotidine (Famotidine) 20 Mg Tablet, 20 MG PO BID, (Reported)


   Entered as Reported by: TOMASA RALPH on 22


   Last Action: Continued


Insulin Detemir (Levemir Flextouch) 100 Unit/Ml (3 Ml) Insuln.pen, 18 UNITS SC 

BID, (Reported)


   Entered as Reported by: TOMASA RALPH on 22


   Last Action: Converted


Liraglutide (Victoza 3-Jose) 0.6 Mg/0.1 Ml (18 Mg/3 Ml) Pen.injctr, 1.8 MG SQ HS,

(Reported)


   Entered as Reported by: TOMASA RALPH on 22


   Last Action: Converted


Lisinopril (Lisinopril) 20 Mg Tablet, 20 MG PO DAILY, (Reported)


   Entered as Reported by: TOMASA RALPH on 22


   Last Action: Held


Discontinued Medications


Lisinopril (Lisinopril) 20 Mg Tablet, 20 MG PO DAILY


   Discontinued Reason: No Longer Taking


   Prescribed by: TASNEEM MOELLER on 21


   Last Action: Discontinued


Metoprolol Tartrate (Metoprolol Tartrate) 25 Mg Tablet, 25 MG PO BID


   Discontinued Reason: No Longer Taking


   Prescribed by: TASNEEM MOELLER on 21


   Last Action: Discontinued





Physical Exam-Cardiology


Physical Exam


Vital Signs/I&O











 22





 22:12 00:00 00:25 00:40


 


Temp  36.5  


 


Pulse  101  


 


Resp  22  


 


B/P (MAP)  171/91 (117) 167/83 (111) 169/97 (121)


 


Pulse Ox 93 95  


 


O2 Delivery Vapotherm Vapotherm  


 


O2 Flow Rate 30.00 30.00  





  80.00  


 


FiO2 80   


 


    





 22





 01:00 02:14 04:00 04:50


 


Temp   36.9 


 


Pulse 97  93 


 


Resp   16 


 


B/P (MAP)   149/83 (105) 


 


Pulse Ox  90 92 92


 


O2 Delivery  Vapotherm Vapotherm Vapotherm


 


O2 Flow Rate  30.00 30.00 40.00





   80.00 


 


FiO2  80  100


 


    





 22 





 07:00 07:12 08:00 


 


Temp   37.1 


 


Pulse 96  92 


 


Resp   20 


 


B/P (MAP)   153/85 (107) 


 


Pulse Ox   95 


 


O2 Delivery  Vapotherm Vapotherm 


 


O2 Flow Rate  40.00 40.00 





  95.00 95.00 














 22





 00:00


 


Intake Total 100 ml


 


Output Total 950 ml


 


Balance -850 ml





Capillary Refill : Less Than 3 Seconds


Constitutional:  well-developed, other (non-verbal - nods head slowly in 

response to questions)


Neck:  No carotid bruit; carotid pulses are 2 + bilaterally


Respiratory:  No accessory muscle use, No respiratory distress; chest expansion 

is symmetric, chest is bilaterally symmetric, lungs clear to auscultation


Cardiovascular:  regular rate-rhythm; No JVD; tachycardia, S1 and S2


Gastrointestinal:  No tender; soft, round, audible bowel sounds


Extremities:  no lower extremity edema bilateral


Neurologic/Psychiatric:  other (RUE and RLE flaccid, facial droop right sided; 

LLE and LUE chronically weak 4/5)


Skin:  No rash on exposed areas, No ulcerations on exposed areas





Data Review


Labs


Laboratory Tests


22 12:00: Glucometer 129H


22 15:28: Glucometer 114H


22 20:36: Glucometer 98


22 23:32: Glucometer 99


22 06:08: 


White Blood Count 8.8, Red Blood Count 4.70, Hemoglobin 13.1L, Hematocrit 40, 

Mean Corpuscular Volume 86, Mean Corpuscular Hemoglobin 28, Mean Corpuscular 

Hemoglobin Concent 33, Red Cell Distribution Width 13.2, Platelet Count 312, 

Mean Platelet Volume 10.1, Immature Granulocyte % (Auto) 0, Neutrophils (%) 

(Auto) 68, Lymphocytes (%) (Auto) 17, Monocytes (%) (Auto) 11, Eosinophils (%) 

(Auto) 3, Basophils (%) (Auto) 1, Neutrophils # (Auto) 6.0, Lymphocytes # (Auto)

1.5, Monocytes # (Auto) 1.0, Eosinophils # (Auto) 0.2, Basophils # (Auto) 0.1, 

Immature Granulocyte # (Auto) 0.0, Sodium Level 139, Potassium Level 3.3L, 

Chloride Level 106, Carbon Dioxide Level 24, Anion Gap 9, Blood Urea Nitrogen 

10, Creatinine 0.61, Estimat Glomerular Filtration Rate 116, BUN/Creatinine 

Ratio 16, Glucose Level 108H, Calcium Level 8.7, Corrected Calcium 9.1, 

Magnesium Level 1.8, Total Bilirubin 1.6H, Aspartate Amino Transf (AST/SGOT) 10,

Alanine Aminotransferase (ALT/SGPT) 8, Alkaline Phosphatase 77, Total Protein 

6.8, Albumin 3.5





Microbiology


22 MRSA Screen - Final, Complete


          MRSA not isolated








Radiology


[place], [place]





NAME:   MURRAY WATTS


MED REC#:   [id number]


ACCOUNT#:   [id number]


PT STATUS:   REG ER


:   1970


PHYSICIAN:   JOHN OJEDA


ADMIT DATE:   22/ER


***Draft***


Date of Exam:22





CT HEAD WO-R/O STROKE








PROCEDURE: CT head w/o r/o stroke.





TECHNIQUE: Multiple contiguous axial images were obtained through


the brain without the use of intravenous contrast. Auto Exposure


Controls were utilized during the CT exam to meet ALARA standards


for radiation dose reduction. 





INDICATION: Stroke.





COMPARISON: Prior examination from 2022.





FINDINGS: The ventricles and sulci are within normal limits.


There are a few lacunar infarcts. There is no hydrocephalus.


There is no midline shift. There is no mass, hemorrhage or


extra-axial fluid collection. The calvarium is intact. Sinuses


and mastoid air cells are clear.





IMPRESSION: Atrophy and some mild chronic microvascular ischemic


disease with a few lacunar infarcts, particularly in the left


basal ganglia and to a lesser degree right basal ganglia. No


other acute intracranial abnormality. If there is high clinical


concern for an acute CVA further evaluation with MRI should be


considered. 





  Dictated on workstation # [id number]








Dict:   22


Trans:   22


[facility] [id number]





Interpreted by:     JEFF ARMSTRONG MD


Electronically signed by:NAME:   MURRAY WATTS


MED REC#:   [id number]


ACCOUNT#:   [id number]


PT STATUS:   ADM IN


:   1970


PHYSICIAN:   GILDA HERNADEZ DO


ADMIT DATE:   07/10/22/MARCUS


                                   ***Draft***


Date of Exam:22





US CAROTID RUBÉN COMPLETE [id number]








PROCEDURE: 


US carotid duplex bilateral.





TECHNIQUE: 


Multiple Real-time grayscale images were obtained over the


carotid arteries in various projections bilaterally. Additional


spectral analysis and color Doppler duplex images were also


obtained.





INDICATION: 


Followup post-CVA.





FINDINGS: 


The left carotid system and vertebral system could not be


adequately interrogated due to patient position and pain.





Real-time imaging shows minimal atherosclerotic plaquing within


the right carotid bulb and internal and external carotid


arteries. Waveforms and peak velocities are normal with normal


carotid ratios.





IMPRESSION:


1. Very limited exam. The right carotid artery shows no


hemodynamic disease with minimal plaquing.


2. Left carotid system and vertebral arteries were not adequately


evaluated due to patient's pain and position.


3. Previous CT angiography of the neck on 2022 suggested


the carotid and vertebral arteries to be widely patent without


significant stenosis.





Parameters based on the consensus panel Gray-Scale and Doppler


ultrasound criteria published 


2003, Radiology, Volume 229. 





DOPPLER (peak systolic velocity M/S 


    


                Right    Left





CCA              1.12     1.33





ICA Proximal      .50     NOT SEEN





ICA Mid           .63     NOT SEEN


 


ICA Distal        .76     NOT SEEN





RATIO            .68     N/A





ECA              .93     NOT SEEN





VERT              NOT SEEN     NOT SEEN








  Dictated on workstation # [id number]








Dict:   22 1034


Trans:   22 1047


 [id number]





Interpreted by:     CUAUHTEMOC GOMEZ MD


Electronically signed by:





ECG Impression


ECG


Comment


SR with first degree AV block





A/P-Cardiology


Assessment/Admission Diagnosis


CVA with right sided hemiparesis, aphasia


- reported h/o several CVA/TIA's following COVID infection in  with left 

sided weakness - presents this time with righ sided 

hemiparesis/aphasia/dysphagia


- The stroke team at  was consulted with no further recommendations other than

continuing aspirin and Plavix and obtaining an MRI


- Management per stroke team


- Follows with Dr. Ramirez of neurology services at [facility]


- Carotid u/s7-:  Very limited exam. The right carotid artery shows no 

hemodynamic disease with minimal plaquing.  Left carotid system and vertebral 

arteries were not adequately evaluated due to patient's pain and position. 

Previous CT angiography of the neck on 2022 suggested


the carotid and vertebral arteries to be widely patent without significant 

stenosis.





?Angioedema


- Lisinopril stopped





Fall at home





Sleep apnea


- non-complaint with sleep study





? aspiration


- NPO





HTN





Crohn's dz


- h/o colectomy





DM 2





Discussion and Recomendations


CVA


- management per stroke team - please see recs per [facility] as above


- source undetermined - consider implant of ILR to r/o possible a-fib/flutter as

cause


BP not well controlled


- add BB


Monitor lab closely


Further recs will be based on hospital course


We would like to thank Dr. Hernadez for this consult











ROXANNE BRADY           2022 10:35

## 2022-07-11 NOTE — PHYSICAL THERAPY EVALUATION
PT Evaluation-General


Medical Diagnosis


Admission Date


Jul 10, 2022 at 16:19


Medical Diagnosis:  CVA


Onset Date:  Jul 10, 2022





Therapy Diagnosis


Therapy Diagnosis:  impaired mobility, hemiparesis





Precautions


Precautions/Isolations:  Aspiration, Fall Prevention, Standard Precautions





Referral


Physician:  Gilda Esquivel DO


Reason for Referral:  Evaluation/Treatment





Medical History


Additional Medical History


Past Medical History


Surgeries:  Abdominal, Appendectomy, Bowel Surgery, Gallbladder


Hypertension


Sexually Transmitted Disease:  No


HIV/AIDS:  No


Crohns Disease, Gall Bladder Disease


Diabetes, Non-Insulin dep


Blood Disorders:  No


Adverse Reaction/Blood Tranf:  No


Reviewed History:  Yes





Social History


Home:  Single Level


Current Living Status:  Spouse


Entry Into Home:  Stairs Without Railing


one step to enter and then another





Prior


Prior Level of Function


SCALE: Activities may be completed with or without assistive devices.





6-Indepedent-patient completes the activity by him/herself with no assistance 

from a helper.


5-Set-up or Clean-up Assistance-helper sets up or cleans up; patient completes 

activity. [place] assists only prior to or  


    following the activity.


4-Supervision or Touching Assistance-helper provides verbal cues and/or 

touching/steadying and/or contact guard assistance as patient completes 

activity. Assistance may be provided   


    throughout the activity or intermittently.


3-Partial/Moderate Assistance-helper does LESS THAN HALF the effort. [place] 

lifts, holds or supports trunk or limbs, but provides less than half the effort.


2-Substantial/Maximal Assistance-helper does MORE THAN HALF the effort. [place] 

lifts or holds trunk or limbs and provides more than half the effort.


2-[place] does ALL the effort. Patient does none of the effort to 

complete the activity. Or, the assistance of 2 or more helpers is required for 

the patient to complete the  


    activity.


If activity was not attempted, code reason:


7-Patient Refused.


9-Not Applicable-not attempted and the patient did not perform the activity 

before the current illness, exacerbation or injury.


10-Not Attempted due to Environmental Limitations-(lack of equipment, weather 

restraints, etc.).


88-Not Attempted due to Medical Conditions or Safety Concerns.


Bed Mobility:  6


Transfers (B,C,W/C):  6


Gait:  6


Stairs:  6


Indoor Mobility (Ambulation):  Independent


Stairs:  Independent


Prior Devices Use:  Walker (4 wheeled)





PT Evaluation-Current


Subjective


Patient in bed pre tx, agrees to PT, wife states he has some low back pain.





Pt/Family Goals


none stated





Objective


Patient Orientation:  Person, Unable to Assess, Non-Verbal/Aphasic


Attachments:  Oxygen, Nuñez Catheter, IV





ROM/Strength


Strength Lower Extremities


RLE grossly 0/5 and has increased tone





Transfers


Roll Left to Right (QC):  1


Sit to Lying (QC):  1


Lying to Sitting/Side of Bed(Q:  1


Patient is dependent for supine <-> sit, he is able to sit for a few minutes 

with mod assist for sitting balance, he leans to the right side but can help 

using his left arm if he has something to hold onto, he doesn't do very well by 

using his left arm on the bed.  Patient's head leans pretty severely to the left

side, he cannot right it and is resistant to assist to right it.





Balance


Sitting Static:  Poor


Sitting Dynamic:  Poor





Assessment/Needs


Patient in bed post tx with nurse call, phone, tray, all needs met, wife in 

room.  Patient has impaired mobility, hemiparesis, impaired balance.  He needs 

assist of 2 for supine <-> sit and assist with sitting balance.


Rehab Potential:  Guarded





PT Long Term Goals


Long Term Goals


PT Long Term Goals Time Frame:  Jul 18, 2022


Roll Left & Right (QC):  3


Sit to Lying (QC):  3


Lying-Sitting on Side/Bed(QC):  3


Sit to Stand (QC):  3


Chair/Bed-to-Chair Xfer(QC):  2





PT Plan


Problem List


Problem List:  Activity Tolerance, Functional Strength, Safety, Balance, Gait, 

Transfer, Bed Mobility, ROM





Treatment/Plan


Treatment Plan:  Continue Plan of Care


Treatment Plan:  Bed Mobility, Education, Functional Activity Promise, Functional 

Strength, Gait, Safety, Therapeutic Exercise, Transfers


Treatment Duration:  Jul 18, 2022


Frequency:  6 times per week


Estimated Hrs Per Day:  .25 hour per day


Patient and/or Family Agrees t:  Yes





Safety Risks/Education


Patient Education:  Correct Positioning, Safety Issues


Teaching Recipient:  Patient


Teaching Methods:  Demonstration, Discussion


Response to Teaching:  Reinforcement Needed





Discharge Recommendations


Plan


Patient will perform bed mobility and transfer training, balance and endurance 

training, functional strengthening, and education, to improve functional 

mobility and independence at home.


Therapy Discharge Recommendati:  Scheduled Assistance, Post Acute PT





Time/GCodes


Time In:  1012


Time Out:  1026


Total Billed Treatment Time:  14


Total Billed Treatment


1 visit


EVM 14'











ALYSSIA MONTILLA PT                Jul 11, 2022 10:46

## 2022-07-11 NOTE — SPEECH THERAPY PROGRESS NOTE
Therapy Progress Note


Speech pathology re-attempted the clinical bedside swallowing evaluation at 

1025. At this time, the patient was receiving care from cardiology following by 

an additional evaluation. Due to this, the clinician will re-attempt at a later 

time. Thank you.











EZRA IYER               Jul 11, 2022 10:54

## 2022-07-11 NOTE — ST DYSPHAGIA EVALUATION
Speech Evaluation-General


Medical Diagnosis


CVA


Onset Date:  Jul 10, 2022





Therapy Diagnosis


Therapy Diagnosis:  Severe Oropharyngeal Dysphagia





Precautions


Precautions:  Fall, Aspiration


Precautions/Isolations:  Aspiration, Fall Prevention, Standard Precautions





Referral


Referring Physician:  Dr. Castro


Reason for Referral:  Evaluation/Treatment





Medical History


Current History


The patient is a 51 year-old male with a past medical history of COVID, TIA, 

strokes, diabetes, HTN, and Crohn's disease, who presented to [facility] 

[facility] with right sided weakness and slurred speech. 





Head CT: 7/9/2022: IMPRESSION: Atrophy and some mild chronic microvascular 

ischemic disease with a few lacunar infarcts, particularly in the left basal 

ganglia and to a lesser degree right basal ganglia. No other acute intracranial 

abnormality. If there is high clinical concern for an acute CVA further 

evaluation with MRI should be considered. 





CXR: 7/11/2022: Left lung volume loss and nonspecific infiltrates, likely a 

combination of pneumonia and partial atelectasis.


Reviewed History:  Yes





Social History


Current Living Status:  Spouse





Speech PLF/Current-Dysphagia


Prior Level of Function


The patient was unable to provide information to the clinician regarding his 

prior swallowing history due to his current expressive aphasia. To note, the 

patient is able to communicate with nonverbal gestures, facial expressions, 

writing, and spelling. The patient's wife was present who stated the patient 

displayed s/s of suspected aspiration with P.O. intake since his prior stroke, 

"coughing and choking" throughout each meal. Additionally, the patient's wife 

stated the patient has dentures but does not wear his dentures. The patient 

consumes a soft diet with thin liquids.





At this time, the patient is currently N.P.O.





Subjective


The patient was re-positioned upright in bed by the clinician. Pillows were used

to position head in a mid-line position, as preference to the left was 

displayed. Per patient's wife, the patient's head positioning is similar at home

as his head rests to the left in his lift chair. The patient was alert and 

agreed to participation in the clinical bedside swallowing evaluation. The 

patient's wife remains at bedside.





Cognitive Status


Patient Orientation:  Person, Non-Verbal/Aphasic





Oral Motor Skills


Dentition:  Edentalous


Ability to Follow Directions:  Fair


Oral Expression Ability:  Severe Impairment


Observation:  Excessive Secretions Oral, Excessive Excretion Hypopharynx, Oral 

Cavity Suction, Hypopharynx Suction





Face


Facial Symmetry:  Asymmetrical (Right facial droop/weakness.)





Oral-Facial Assessment


Oral-Facial Dentition:  Normal


Labial Seal Description:  Poor Coordination


Lingual Protrusion:  Abnormal (Lingual protrusion present at rest. Open mouth 

posture.)


Lingual ROM:  Abnormal


Lingual Strength:  Abnormal


Volitional Dry Swallow:  No


Voluntary Cough:  No


Can Clear Throat Volitionally:  No


Productive Cough:  Yes


Productive Throat Clear:  Yes





Dysphagia Evaluation


Consistencies Presented:  Thin Liquid (Ice chips, teaspoon.), Pureed


Oral Phase:  Anterior Spillage, Unable to Form Bolus, Reduced Oral Transit


The patient was able to minimally remove the bolus item from the teaspoon. 

Immediate anterior spillage was present with the ice chip and teaspoon of water.

Limited to zero lingual and oral manipulation was present with the ice chip. 

Maximum verbal prompting was required for posterior transfer of the melted ice 

chip material, as oral holding was present.


Pharyngeal Phase:  Delayed Swallow


Laryngeal elevation was present to palpation. The patient demonstrated s/s of 

suspected aspiration with his own saliva, ice chips, teaspoons of thin liquid, 

and a half teaspoon of puree characterized by an immediate cough, a red face, 

watery eyes, a wet vocal quality and a reduction is SpO2% from 94% to 87%. SpO2%

returned to 94% prior to the clinician's exit from the room.


Funct. Velo/Pharyngeal Symptom:  Clears Throat, Cough After Swallow, Wet Voice


Dietary Recommendations:  NPO


Liquid Recommendations:  NPO





Recommendations:


- Strict N.P.O.


- Frequent and excellent oral care to reduce the transfer of oral bacteria to 

the lungs should aspiration of secretions occur.


- Moist oral swabs (with the water removed) for oral comfort.


- The patient should attempted to swallow his own secretions, relying less on 

the [place]. as his own secretions are excellent oropharyngeal swallowing 

rehabilitation.


- Speech pathology to re-assess the oropharyngeal swallowing function daily.





The results and recommendations were discussed extensively with the patient, the

patient's wife, and the RN. All present verbalized comprehension of the material

and denied additional questions at this time.


Dysphagia Evaluation Summary


The patient demonstrated severe oropharyngeal dysphagia characterized by reduced

facial, oral, and lingual strength and coordination, a delayed onset of the 

pharyngeal swallow, and poor airway protection in the presence of bolus 

material. The patient demonstrated s/s of suspected aspiration with his own 

saliva, ice chips, teaspoons of thin liquid, and a half teaspoon of puree 

characterized by an immediate cough, a red face, watery eyes, a wet vocal 

quality and a reduction is SpO2% from 94% to 87%. SpO2% returned to 94% prior to

the clinician's exit from the room.





Speech Short Term Goals


Short Term Goals


Short Term Goals


1. The patient will tolerate the least restrictive consistency of P.O. trials 

for possible advancement of a P.O. diet consistency.


Time Frame-STG:  Three Weeks.





Speech Long Term Goals


Long Term Goals


1. The patient will tolerate the least restrictive consistency without s/s of 

suspected aspiration.


Time Frame:  One Week.





Speech-Plan


Treatment Plan


Speech Therapy Treatment Plan:  Continue Plan of Care


Treatment Duration:  Jul 25, 2022


Frequency:  4 times per week (Four to five times per week.)


Estimated Hrs Per Day:  .25 hour per day


Rehab Potential:  Guarded


Barriers to Learning:  


At this time, the clinician questions the patient's ability to tolerate


extensive periods of skilled treatment and rehabilitation.


Pt/Family Agrees to Plan:  Yes





Safety Risks/Education


Teaching Recipient:  Patient, Significant Other


Teaching Methods:  Discussion


Response to Teaching:  Verbalize Understanding, Reinforcement Needed


Education Topics Provided:  


Results, Recommendations, S/s of Suspected Aspiration, Aspiration Risks,


Aspiration Pneumonia, Anatomy and Physiology of the Swallow Function





Time


Speech Therapy Time In:  13:00


Speech Therapy Time Out:  13:30


Total Billed Time:  30


Billed Treatment Time


1, ISAIAS MATHUR ELIZABETH ST               Jul 11, 2022 14:19

## 2022-07-11 NOTE — SPEECH THERAPY PROGRESS NOTE
Therapy Progress Note


Speech pathology attempted the consulted evaluation at 0820. At this time, the 

patient is undergoing a procedure. ST will re-attempt as able and appropriate. 

Thank you.











EZRA IYER               Jul 11, 2022 09:03

## 2022-07-11 NOTE — PROGRESS NOTE - HOSPITALIST
FRANCO PAZ A MED STUDENT 7/11/22 1213:


Subjective


HPI/CC On Admission


Patient is a 50yo male, h/o COVID about 1.5y ago and multiple neurologic insults

during hospitalization for Covid at that time (TIA vs strokes).  He has had 

chronic debility since then with several ED visits for weakness and strokelike 

symptoms.  Apparently his sons were at home with him and he had a fall in the 

bathroom with increased weakness to the left side.  Unsure of LOC as sons are 

not here at this time for history.  The patient (with significantly slurred 

speech) is able to tell me that his wife is working in [place] - which is 

consistent with prior visits to our ER.  He does state he has a mild headache.  

Otherwise his speech is so slurred he is very difficult to understand.


Upon my arrival patient exhibited right neglect tongue was sticking out mildly 

swollen without erythema.  The patient is eyes were open and did orient to voice

he was slow to respond and very dysarthric unintelligible speech.  He was able 

to follow simple commands but when asked to use his right hand to touch my 

finger he only moved his left hand and was able to do so with good control of 

the left hand.  His wife came later in the day and reports that he had been 

seeing neurologist Dr. Loja had a rather extensive work-up was started on 

aspirin and Plavix but no apparent large vessel disease was noted.  He had an 

echocardiogram gram and there is been no evidence for atrial fibrillation.  He 

was having no neurologic issues previous to COVID infection in January 2021.  He

had been doing well at home until the night of his admission when his right leg 

apparently gave out he stumbled forward they helped him to the bed and again 

noted that he was dysarthric and his tongue appeared to be swollen.  There were 

no rash issues reported he was confused but there was no loss of consciousness 

and no reported pallor.  He had been feeling well up until that point.


Subjective/Events-last exam


Thomas is a 50 yo male who was admitted for right sided weakness and slurred 

speech. Pt has hx of HTN, diabetes, crohns disease and multiple CVAs following 

COVID. Pts primary historian is his wife, who states at baseline prior to this 

event he had left sided weakness and mild slurred speech. Since this event he 

has developed right sided weakness and increased slurred speech. She does report

his tongue swelling has improved today. MRI was recommended, but could not be co

mpleted d/t body habitus.





Review of Systems


General:  No Chills, No Night Sweats


HEENT:  No Head Aches, No Visual Changes


Pulmonary:  No Dyspnea, No Cough


Cardiovascular:  No: Chest Pain, Palpitations


Gastrointestinal:  No: Nausea, Vomiting


Genitourinary:  No Dysuria, No Frequency


Musculoskeletal:  No: neck pain, shoulder pain


Neurological:  Weakness, Change in speech





Objective


Exam


Vital Signs





Vital Signs








  Date Time  Temp Pulse Resp B/P (MAP) Pulse Ox O2 Delivery O2 Flow Rate FiO2


 


7/11/22 11:56  105 20 151/87 (108) 94 Vapotherm 20.00 





       50.00 


 


7/11/22 08:38        100


 


7/11/22 07:51 35.6       





Capillary Refill : Less Than 3 Seconds


General Appearance:  WD/WN, Obese


HEENT:  Other (enlarged tongue with protrusion but no deviation to one side)


Neck:  Non Tender, Supple


Respiratory:  Chest Non Tender, Lungs Clear, Normal Breath Sounds


Cardiovascular:  Regular Rate, Rhythm, No Edema


Gastrointestinal:  Normal Bowel Sounds, Non Tender, Soft


Extremity:  Normal Capillary Refill, Pedal Edema (mild non pitting edema)


Neurologic/Psychiatric:  Alert, Oriented x3, Aphasia, Motor Weakness, Other 

(Motor strength 4+ on left upper and lower extremities, Motor strength 0 on rig

ht upper and lower extremity)


Skin:  Normal Color, Warm/Dry





Results/Procedures


Lab


Laboratory Tests


7/11/22 05:30








Patient resulted labs reviewed.





Assessment/Plan


Assessment and Plan


Assess & Plan/Chief Complaint


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


Angioedema of tongue


HTN





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


   -Carotid ultrasound 


   -Cardiology consulted, appreciate their recommendations


   -EKG ordered


   -CT head showed atrophy and chronic ischemic microvascular changes with lunar

infarcts in Left basal ganglia


   -CTA showed no large vessel occlusion


   -MRI recommended, but couldn't be done d/t body habitus


   -Speech therapy, Physical therapy and Occupational therapy 


   -NPO


Angioedema of tongue


   -Hold lisinopril


   -NPO


   -Speech therapy consulted


HTN


   -Permissive HTN following stroke


   -Amlodipine and Metoprolol currently


Diabetes


   -SSI





Diet-NPO


DVT prophylaxis- SCDs





GILDA HERNADEZ DO 7/12/22 0558:


Subjective


HPI/CC On Admission


Date Seen by Provider:  Jul 11, 2022


Time Seen by Provider:  09:30


Subjective/Events-last exam


Pt is about the same


Right sided weakness is profound


In-patient rehab eval


PT and OT ordered


Can't fit in the MRI due to abdominal girth


Lovenox will be ordered


Hopefully we will be able to initiate oral medication due to dysphagia 


Cardiology will be consulted to evaluate cryptogenic stroke


Has had issues since he suffered from Covid January 2021





Review of Systems


Neurological:  Change in speech, Confusion





Objective


Exam


General Appearance:  No Apparent Distress, WD/WN, Chronically ill, Obese


Cardiovascular:  Regular Rate, Rhythm


Neurologic/Psychiatric:  Alert, Oriented x3, Aphasia, Motor Weakness





Assessment/Plan


Assessment and Plan


Assess & Plan/Chief Complaint


CVA management


Hold ACEi


Speech therapy for diet


IRF





Supervisory-Addendum Brief


Verification & Attestation


Participated in pt care:  history, MDM, physical


Personally performed:  exam, history, MDM, supervision of care


Care discussed with:  Medical Student


Procedures:  n/a


Results interpretation:  Verified all documentation


Verification and Attestation of Medical Student E/M Service





A medical student performed and documented this service in my presence. I 

reviewed and verified all information documented by the medical student and made

modifications to such information, when appropriate. I personally performed the 

physical exam and medical decision making. 





 Gilda Hernadez, Jul 12, 2022,05:56











FRANCO PAZ MED STUDENT    Jul 11, 2022 12:13


GILDA HERNADEZ DO                Jul 12, 2022 05:58

## 2022-07-11 NOTE — DIAGNOSTIC IMAGING REPORT
INDICATION: Respiratory distress. Compared 2/8/2022.



FINDINGS:



There is very poor expansion of the left lung with diffuse patchy

left lung opacity in part atelectasis, however, pneumonia

superimposed not excluded. The right lung is clear. The heart

size stable.



IMPRESSION:



Left lung volume loss and nonspecific infiltrates, likely a

combination of pneumonia and partial atelectasis.



Dictated by: 



  Dictated on workstation # [id number]

## 2022-07-11 NOTE — OCCUPATIONAL THERAPY EVAL
OT Evaluation-General/PLF


Medical Diagnosis


Admission Date


Jul 10, 2022 at 16:19


Medical Diagnosis:  CVA


Onset Date:  Jul 10, 2022





Therapy Diagnosis


Therapy Diagnosis:  decreased ADL status and weakness





Precautions


Precautions/Isolations:  Aspiration, Fall Prevention, Standard Precautions





Referral


Physician:  Gilda Esquivel DO





Medical History


Additional Medical History


H/o COVID 1.5yrs ago with multiple CVAs during hospitalization. Several ED 

admits since then d/t weakness and stroke-like symptoms. Pt also has hx of HTN, 

Chrohns, and DM.


Current History


Pt fell in bathroom leading to increased L weakness with slurred speech, R 

neglect tongue. Imaging shows pt suffered CVA.





Social History


Home:  Single Level


Current Living Status:  Spouse


Entry Into Home:  Stairs Without Railing





ADL-Prior Level of Function


SCALE: Activities may be completed with or without assistive devices.





6-Indepedent-patient completes the activity by him/herself with no assistance 

from a helper.


5-Set-up or Clean-up Assistance-helper sets up or cleans up; patient completes 

activity. [place] assists only prior to or  


    following the activity.


4-Supervision or Touching Assistance-helper provides verbal cues and/or 

touching/steadying and/or contact guard assistance as patient completes 

activity. Assistance may be provided   


    throughout the activity or intermittently.


3-Partial/Moderate Assistance-helper does LESS THAN HALF the effort. Webster 

lifts, holds or supports trunk or limbs, but provides less than half the effort.


2-Substantial/Maximal Assistance-helper does MORE THAN HALF the effort. Webster 

lifts or holds trunk or limbs and provides more than half the effort.


6-Eswbovbnq-qehzbc does ALL the effort. Patient does none of the effort to 

complete the activity. Or, the assistance of 2 or more helpers is required for 

the patient to complete the  


    activity.


If activity was not attempted, code reason:


7-Patient Refused.


9-Not Applicable-not attempted and the patient did not perform the activity 

before the current illness, exacerbation or injury.


10-Not Attempted due to Environmental Limitations-(lack of equipment, weather 

restraints, etc.).


88-Not Attempted due to Medical Conditions or Safety Concerns.


ADL PLOF Comments


Wife provided information about pt's PLOF. In the time between covid and this 

recent CVA, pt was able to walk and had L sided weakness. He required some level

of assistance with all ADLs. Wife said pt could use LUE but had limited strength

and coordination, RUE WFL.  He receives services from different agencies, 

including [facility]. Wife is primary caretaker, but son will take care of pt

when wife is at work on the weekends.


Self Care:  Needed Some Help


Functional Cognition:  Independent


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


Drive Self:  No





OT Current Status


Subjective


Pt laying in bed and communicating with wife via printed communication board 

upon OT arrival, agreeable to eval/tx. Pt was not able to verbally communicate, 

so wife provided eval information. Pt communicated via hand gestures with 

delayed response time.





Mental Status/Objective


Patient Orientation:  Person, Non-Verbal/Aphasic


Attachments:  Drains, Nuñez Catheter, IV, Oxygen (Vapotherm), Telemetry





Current


Glasses/Contacts:  No


Hearing Aids:  No


Hand Dominance:  Right


Upper Extremity ROM


Limited in LUE; No AROM noted in RUE


Upper Extremity Sensation


Pt reports numbness and tingling in RUE. Was able to tell OT was touching RUE 

but reported the sensation was different.


Upper Extremity Strength


No active movement noted/palpated in RUE during evaluation. LUE grossly 2+/5





ADL-Treatment


Eating (QC):  88 (NPO)


Oral Hygiene (QC):  9 (Pt/wife declined, as pt doesn't have teeth and doesn't 

complete at baseline.)


Shower/Bathe Self (QC):  1 (Per clinical judgement, assist x2 required for task)


Upper Body Dressing (QC):  2 (Per clinical judgment, max-total assist required.)


Lower Body Dressing (QC):  1 (Per clinical judgement, assist x2 required)


On/Off Footwear (QC):  1 (Per clinical judgement)





Other Treatments


Pt remained supine in bed throughout duration of tx. PLOF and living conditions 

was obtained by wife, but pt would use hand gestures in agreement. He 

participated in UE screen and used washcloth to wipe face. Pt used suction 

throughout tx in L hand, independently. Pt and wife were educated on the purpose

and benefit of skilled OT services, wife verbalized understanding and pt gave 

thumbs up in approval. OT informed wife and pt in OT POC, they were in 

agreement. Per PT report, pt required total assist for supine to/from sit 

transfers, mod A sitting balance for a couple of minutes (leans towards R side).

Post tx, pt in bed, call light in reach and all needs met, wife present.





Education


OT Patient Education:  Correct positioning, Energy conservation, Exercise 

program, Instructions to caregiver, Modified ADL techniques, Progress toward 

Goal/Update tx plan, Purpose of tx/functional activities, Rehab process, Safety 

issues


Teaching Recipient:  Patient, Significant Other


Teaching Methods:  Discussion


Response to Teaching:  Verbalize Understanding





OT Long Term Goals


Long Term Goals


Time Frame:  Aug 12, 2022


Eating (QC):  3


Toileting Hygiene (QC):  3


Shower/Bathe Self (QC):  2


Upper Body Dressing (QC):  3


Lower Body Dressing (QC):  2


On/Off Footwear (QC):  2


Additional Goals:  1-Demonstrate ADL Tasks, 2-Verbalize Understanding, 

3-ImproveStrength/Promise


1=Demonstrate adherence to instructed precautions during ADL tasks.


2=Patient will verbalize/demonstrate understanding of assistive 

devices/modifications for ADL.


3=Patient will improve strength/tolerance for activity to enable patient to 

perform ADL's.





OT Education/Plan


Problem List/Assessment


Assessment:  Decreased Activ Tolerance, Decreased UE Strength, Impaired Bed 

Mobility, Impaired Coordination, Impaired Funct Balance, Impaired I ADL's, 

Impaired Self-Care Skills, Restricted Funct UE ROM


Pt would benefit from skilled OT services in order to increase functional use of

RUE, improve independence and safety with ADLs and functional mobility, and for 

neuromuscular reeducation in order to maximize LOF for safe return home with 

spouse.





Discharge Recommendations


Plan/Recommendations:  Continue POC


Therapy Discharge Recommendati:  Post Acute OT (SNF)


Comment


At this time, this clinician questions pt's ability to tolerate extensive 

periods of skilled therapy and rehab, he would benefit from continued skilled OT

service at discharge. OT currently recommends SNF at discharge.





Treatment Plan/Plan of Care


Patient would benefit from OT for education, treatment and training to promote 

independence in ADL's, mobility, safety and/or upper extremity function for 

ADL's.


Plan of Care:  ADL Retraining, Functional Mobility, UE Funct Exercise/Act, UE 

Neuromus Re-Ed/Coord


Treatment Duration:  Aug 12, 2022


Frequency:  5 times per week


Estimated Hrs Per Day:  .25 hour per day


Rehab Potential:  Guarded





Time/GCodes


Start Time:  13:49


Stop Time:  14:10


Total Time Billed (hr/min):  21


Billed Treatment Time


1, AVERY MONTES OT          Jul 11, 2022 14:21

## 2022-07-11 NOTE — DIAGNOSTIC IMAGING REPORT
PROCEDURE: 

US carotid duplex bilateral.



TECHNIQUE: 

Multiple Real-time grayscale images were obtained over the

carotid arteries in various projections bilaterally. Additional

spectral analysis and color Doppler duplex images were also

obtained.



INDICATION: 

Followup post-CVA.



FINDINGS: 

The left carotid system and vertebral system could not be

adequately interrogated due to patient position and pain.



Real-time imaging shows minimal atherosclerotic plaquing within

the right carotid bulb and internal and external carotid

arteries. Waveforms and peak velocities are normal with normal

carotid ratios.



IMPRESSION:

1. Very limited exam. The right carotid artery shows no

hemodynamic disease with minimal plaquing.

2. Left carotid system and vertebral arteries were not adequately

evaluated due to patient's pain and position.

3. Previous CT angiography of the neck on 07/09/2022 suggested

the carotid and vertebral arteries to be widely patent without

significant stenosis.



Parameters based on the consensus panel Gray-Scale and Doppler

ultrasound criteria published 

November 2003, Radiology, Volume 229. 



DOPPLER (peak systolic velocity M/S 

    

                Right    Left



CCA              1.12     1.33



ICA Proximal      .50     NOT SEEN



ICA Mid           .63     NOT SEEN

 

ICA Distal        .76     NOT SEEN



RATIO            .68     N/A



ECA              .93     NOT SEEN



VERT              NOT SEEN     NOT SEEN



Dictated by: 



  Dictated on workstation # [id number]

## 2022-07-12 VITALS — SYSTOLIC BLOOD PRESSURE: 162 MMHG | DIASTOLIC BLOOD PRESSURE: 95 MMHG

## 2022-07-12 VITALS — DIASTOLIC BLOOD PRESSURE: 112 MMHG | SYSTOLIC BLOOD PRESSURE: 167 MMHG

## 2022-07-12 VITALS — DIASTOLIC BLOOD PRESSURE: 87 MMHG | SYSTOLIC BLOOD PRESSURE: 162 MMHG

## 2022-07-12 VITALS — DIASTOLIC BLOOD PRESSURE: 86 MMHG | SYSTOLIC BLOOD PRESSURE: 130 MMHG

## 2022-07-12 VITALS — DIASTOLIC BLOOD PRESSURE: 99 MMHG | SYSTOLIC BLOOD PRESSURE: 154 MMHG

## 2022-07-12 VITALS — DIASTOLIC BLOOD PRESSURE: 88 MMHG | SYSTOLIC BLOOD PRESSURE: 158 MMHG

## 2022-07-12 LAB
ALBUMIN SERPL-MCNC: 3.6 GM/DL (ref 3.2–4.5)
ALP SERPL-CCNC: 77 U/L (ref 40–136)
ALT SERPL-CCNC: 12 U/L (ref 0–55)
BASOPHILS # BLD AUTO: 0.1 10^3/UL (ref 0–0.1)
BASOPHILS NFR BLD AUTO: 1 % (ref 0–10)
BILIRUB SERPL-MCNC: 1.5 MG/DL (ref 0.1–1)
BUN/CREAT SERPL: 15
CALCIUM SERPL-MCNC: 8.8 MG/DL (ref 8.5–10.1)
CHLORIDE SERPL-SCNC: 106 MMOL/L (ref 98–107)
CO2 SERPL-SCNC: 23 MMOL/L (ref 21–32)
CREAT SERPL-MCNC: 0.67 MG/DL (ref 0.6–1.3)
EOSINOPHIL # BLD AUTO: 0.3 10^3/UL (ref 0–0.3)
EOSINOPHIL NFR BLD AUTO: 3 % (ref 0–10)
GFR SERPLBLD BASED ON 1.73 SQ M-ARVRAT: 112 ML/MIN
GLUCOSE SERPL-MCNC: 131 MG/DL (ref 70–105)
HCT VFR BLD CALC: 42 % (ref 40–54)
HGB BLD-MCNC: 13.5 G/DL (ref 13.3–17.7)
LYMPHOCYTES # BLD AUTO: 1.4 10^3/UL (ref 1–4)
LYMPHOCYTES NFR BLD AUTO: 12 % (ref 12–44)
MANUAL DIFFERENTIAL PERFORMED BLD QL: NO
MCH RBC QN AUTO: 28 PG (ref 25–34)
MCHC RBC AUTO-ENTMCNC: 32 G/DL (ref 32–36)
MCV RBC AUTO: 86 FL (ref 80–99)
MONOCYTES # BLD AUTO: 1 10^3/UL (ref 0–1)
MONOCYTES NFR BLD AUTO: 9 % (ref 0–12)
NEUTROPHILS # BLD AUTO: 8.9 10^3/UL (ref 1.8–7.8)
NEUTROPHILS NFR BLD AUTO: 76 % (ref 42–75)
PLATELET # BLD: 315 10^3/UL (ref 130–400)
PMV BLD AUTO: 9.9 FL (ref 9–12.2)
POTASSIUM SERPL-SCNC: 3.4 MMOL/L (ref 3.6–5)
PROT SERPL-MCNC: 6.6 GM/DL (ref 6.4–8.2)
SODIUM SERPL-SCNC: 141 MMOL/L (ref 135–145)
WBC # BLD AUTO: 11.8 10^3/UL (ref 4.3–11)

## 2022-07-12 PROCEDURE — 0JH632Z INSERTION OF MONITORING DEVICE INTO CHEST SUBCUTANEOUS TISSUE AND FASCIA, PERCUTANEOUS APPROACH: ICD-10-PCS | Performed by: INTERNAL MEDICINE

## 2022-07-12 RX ADMIN — INSULIN ASPART SCH UNIT: 100 INJECTION, SOLUTION INTRAVENOUS; SUBCUTANEOUS at 16:00

## 2022-07-12 RX ADMIN — METOPROLOL TARTRATE SCH MG: 1 INJECTION, SOLUTION INTRAVENOUS at 05:45

## 2022-07-12 RX ADMIN — FAMOTIDINE SCH MG: 20 TABLET, FILM COATED ORAL at 09:00

## 2022-07-12 RX ADMIN — ASPIRIN SCH MG: 81 TABLET ORAL at 09:00

## 2022-07-12 RX ADMIN — ENOXAPARIN SODIUM SCH MG: 100 INJECTION SUBCUTANEOUS at 01:06

## 2022-07-12 RX ADMIN — CLOPIDOGREL BISULFATE SCH MG: 75 TABLET, FILM COATED ORAL at 09:00

## 2022-07-12 RX ADMIN — INSULIN ASPART SCH UNIT: 100 INJECTION, SOLUTION INTRAVENOUS; SUBCUTANEOUS at 06:40

## 2022-07-12 RX ADMIN — POTASSIUM CHLORIDE SCH MLS/HR: 200 INJECTION, SOLUTION INTRAVENOUS at 14:12

## 2022-07-12 RX ADMIN — METOPROLOL TARTRATE SCH MG: 1 INJECTION, SOLUTION INTRAVENOUS at 18:25

## 2022-07-12 RX ADMIN — INSULIN ASPART SCH UNIT: 100 INJECTION, SOLUTION INTRAVENOUS; SUBCUTANEOUS at 12:00

## 2022-07-12 RX ADMIN — SODIUM CHLORIDE SCH MLS/HR: 900 INJECTION, SOLUTION INTRAVENOUS at 18:42

## 2022-07-12 RX ADMIN — METOPROLOL TARTRATE SCH MG: 1 INJECTION, SOLUTION INTRAVENOUS at 01:06

## 2022-07-12 RX ADMIN — METOPROLOL TARTRATE SCH MG: 1 INJECTION, SOLUTION INTRAVENOUS at 12:11

## 2022-07-12 RX ADMIN — EMPAGLIFLOZIN SCH MG: 10 TABLET, FILM COATED ORAL at 21:21

## 2022-07-12 RX ADMIN — INSULIN ASPART SCH UNIT: 100 INJECTION, SOLUTION INTRAVENOUS; SUBCUTANEOUS at 21:22

## 2022-07-12 RX ADMIN — ENOXAPARIN SODIUM SCH MG: 100 INJECTION SUBCUTANEOUS at 12:10

## 2022-07-12 RX ADMIN — POTASSIUM CHLORIDE SCH MLS/HR: 200 INJECTION, SOLUTION INTRAVENOUS at 12:11

## 2022-07-12 RX ADMIN — SODIUM CHLORIDE SCH MLS/HR: 900 INJECTION, SOLUTION INTRAVENOUS at 05:45

## 2022-07-12 RX ADMIN — FAMOTIDINE SCH MG: 20 TABLET, FILM COATED ORAL at 21:22

## 2022-07-12 NOTE — OCCUPATIONAL THER DAILY NOTE
OT Current Status-Daily Note


Subjective


Pt laying in bed with family present upon OT/PT arrival, pt agreeable to therapy

cotreat d/t complex medical needs. Pt's speech is still not clear, but he 

attempted to verbally communicate with therapists throughout session.





Mental Status/Objective


Patient Orientation:  Person, Non-Verbal/Aphasic


Attachments:  Drains, Nuñez Catheter, IV, Oxygen, Telemetry





ADL-Treatment


Therapy Code Descriptions/Definitions 





Functional [place]ck[place] Measure:


0=Not Assessed/NA        4=Minimal Assistance


1=Total Assistance        5=Supervision or Setup


2=Maximal Assistance  6=Modified [place]


3=Moderate Assistance 7=Complete IndependenceSCALE: Activities may be completed 

with or without assistive devices.





6-Indepedent-patient completes the activity by him/herself with no assistance 

from a helper.


5-Set-up or Clean-up Assistance-helper sets up or cleans up; patient completes 

activity. [place] assists only prior to or  


    following the activity.


4-Supervision or Touching Assistance-helper provides verbal cues and/or 

touching/steadying and/or contact guard assistance as patient completes 

activity. Assistance may be provided   


    throughout the activity or intermittently.


3-Partial/Moderate Assistance-helper does LESS THAN HALF the effort. [place] 

lifts, holds or supports trunk or limbs, but provides less than half the effort.


2-Substantial/Maximal Assistance-helper does MORE THAN HALF the effort. [place] 

lifts or holds trunk or limbs and provides more than half the effort.


3-Ujgcybjtv-bvcerw does ALL the effort. Patient does none of the effort to 

complete the activity. Or, the assistance of 2 or more helpers is required for 

the patient to complete the  


    activity.


If activity was not attempted, code reason:


7-Patient Refused.


9-Not Applicable-not attempted and the patient did not perform the activity 

before the current illness, exacerbation or injury.


10-Not Attempted due to Environmental Limitations-(lack of equipment, weather 

restraints, etc.).


88-Not Attempted due to Medical Conditions or Safety Concerns.





Other Treatment


Pt required total assistx2 to transfer supine to seated EOB. Pt required mod-max

assistx2-3 to maintain seated EOB balance with proper positioning and equal BUE 

weight bearing, ~8mins. Pt and family educated on the purpose of activity in 

strengthening core stability muscles needed to complete seated ADLs and serve as

a precursor to functional transfers. Pt required v/c's throughout activity to 

maintain midline position, bear weight into RUE, and maintain head in upright 

position. Pt attempted to stand with total A from PT, but he couldn't get his 

bottom off of the bed. . Pt required total assistx2-3 to return seated EOB to 

supine. Post tx, pt supine in bed with call light in reach and all needs met.





Education


OT Patient Education:  Correct positioning, Energy conservation, Instructions to

caregiver, Progress toward Goal/Update tx plan, Purpose of tx/functional act

ivities, Rehab process, Transfer techniques


Teaching Recipient:  Patient, Family, Significant Other


Teaching Methods:  Discussion


Response to Teaching:  Verbalize Understanding, Reinforcement Needed





OT Long Term Goals


Long Term Goals


Time Frame:  Aug 12, 2022


Eating (QC):  3


Toileting Hygiene (QC):  3


Shower/Bathe Self (QC):  2


Upper Body Dressing (QC):  3


Lower Body Dressing (QC):  2


On/Off Footwear (QC):  2


Additional Goals:  1-Demonstrate ADL Tasks, 2-Verbalize Understanding, 3-

ImproveStrength/Promise


1=Demonstrate adherence to instructed precautions during ADL tasks.


2=Patient will verbalize/demonstrate understanding of assistive d

evices/modifications for ADL.


3=Patient will improve strength/tolerance for activity to enable patient to 

perform ADL's.





OT Education/Plan


Problem List/Assessment


Assessment:  Decreased Activ Tolerance, Decreased Safety Aware, Decreased UE 

Strength, Impaired Bed Mobility, Impaired Coordination, Impaired Funct Balance, 

Impaired I ADL's, Impaired Self-Care Skills, Restricted Funct UE ROM


Pt would benefit from skilled OT services in order to increase functional use of

RUE, improve independence and safety with ADLs and functional mobility, and for 

neuromuscular reeducation in order to maximize LOF for safe return home with 

spouse.





Discharge Recommendations


Plan/Recommendations:  Continue POC





Treatment Plan/Plan of Care


Patient would benefit from OT for education, treatment and training to promote 

independence in ADL's, mobility, safety and/or upper extremity function for 

ADL's.


Plan of Care:  ADL Retraining, Functional Mobility, UE Funct Exercise/Act, UE 

Neuromus Re-Ed/Coord


Treatment Duration:  Aug 12, 2022


Frequency:  5 times per week


Estimated Hrs Per Day:  .25 hour per day


Rehab Potential:  Guarded





Time/GCodes


Start Time:  09:31


Stop Time:  09:46


Total Time Billed (hr/min):  15


Billed Treatment Time


1, FA











AVERY MARTINEZ OT          Jul 12, 2022 10:00

## 2022-07-12 NOTE — SPEECH THERAPY DAILY NOTE
Speech Daily Progress Note


Subjective


Date Seen by Provider:  Jul 12, 2022


Time Seen by Provider:  08:50


The patient was repositioned upright for safe swallowing. The patient is alert 

and agreeable to participation in the skilled dysphagia treatment session. The 

patient's wife and family member remain at bedside. The patient remains with a 

head tilt to the left regardless of re-positioning attempts.





Objective


The patient was positioned upright in bed for safe swallowing. The patient is 

receiving 30 lpm of supplemental oxygen via high flow nasal cannula with SpO2% 

at 90% prior to P.O. trials. The patient is visualized to elicit a spontaneous 

swallow of his secretions, immediately displaying a rigorous cough, red face, 

and watery eyes. The suspected signs of aspiration lead the clinician to believe

the patient is not handling his own secretions.





The patient is provided with an ice chip. The patient displays minimal lingual m

anipulation, holding the ice chip for 27 seconds prior to posterior transfer 

initiation. An immediate red face, rigorous cough and reduction in SpO2% to 87% 

were displayed following the swallow. A coated teaspoon of puree was attempted 

(three attempts). The patient orally holds the puree bolus for periods longer 

than 35 seconds with each trial, displaying anterior bolus loss and pocketing of

material in the left buccal cavity. With maximum verbal cueing, posterior 

transfer and elicitation of a pharyngeal swallow occurred. An immediate wet 

vocal quality was displayed. Due to the overt s/s of suspected aspiration with 

thin liquid and puree consistencies, trials were terminated at this time for 

patient safety.





The patient was encouraged to continue attempts of swallowing his own 

secretions, as the [place] remains present and the patient continuously suctions

anterior loss of secretions throughout the session. The patient's wife stated 

the patient frequently coughed on his secretions prior to the most recent str

rigo. The clinician continues to suspect a level of baseline dysphagia prior to 

the patient's most recent admission.





Minimal improvement and progress was made during today's oropharyngeal swallow 

treatment session.





The clinician continues to recommend:


- Strict N.P.O.


- Frequent and excellent oral care to reduce the transfer of oral bacteria to 

the lungs should aspiration of secretions occur.


- Moist oral swabs (with the water removed) for oral comfort.


- The patient should attempt to swallow his own secretions, relying less on the 

[place]. as his own secretions are excellent oropharyngeal swallowing 

rehabilitation.


- Speech pathology to re-assess the oropharyngeal swallowing function daily.





The results and recommendations were discussed extensively with the patient and 

the patient's wife. All present verbalized comprehension of the material and 

denied additional questions at this time.





The clinician continues to recommend skilled speech pathology services at the 

skilled nursing versus long term acute care facility level due to the 

clinician's concerns regarding the patient's ability to tolerate intensive rehab

ilitation.





Assessment


Assessment Current Status:  Fair Progress





Treatment Plan


Continue Plan of Care





Speech Short Term Goals


Short Term Goals


Short Term Goals


1. The patient will tolerate the least restrictive consistency of P.O. trials 

for possible advancement of a P.O. diet consistency.


Time Frame-STG:  Three Weeks.





Speech Long Term Goals


Long Term Goals


1. The patient will tolerate the least restrictive consistency without s/s of 

suspected aspiration.


Time Frame:  One Week.





Speech-Plan


Treatment Plan


Speech Therapy Treatment Plan:  Continue Plan of Care


Treatment Duration:  Jul 25, 2022


Frequency:  4 times per week (Four to five times per week.)


Estimated Hrs Per Day:  .25 hour per day


Rehab Potential:  Guarded


Pt/Family Agrees to Plan:  Yes





Safety Risks/Education


Teaching Recipient:  Patient, Family, Significant Other


Teaching Methods:  Discussion


Response to Teaching:  Verbalize Understanding


Education Topics Provided:  


Recommendations, Progression of Skilled Therapy, S/s of Suspected


Aspiration





Time


Speech Therapy Time In:  08:50


Speech Therapy Time Out:  09:10


Total Billed Time:  20


Billed Treatment Time


ISAIAS Reid











EZRA IYER               Jul 12, 2022 10:06

## 2022-07-12 NOTE — PROGRESS NOTE - CARDIOLOGY
Cardiology SOAP Progress Note


Subjective:


Communication difficult due to aphasia


He does not indicated shortness of breath or cp





Objective:


I&O/Vital Signs











 7/12/22 7/12/22 7/12/22 7/12/22





 00:00 01:00 04:00 07:00


 


Pulse 91 85 88 90


 


Resp   36 


 


B/P (MAP) 162/87 (112)  130/86 (101) 


 


Pulse Ox 95  88 


 


O2 Delivery Vapotherm  Vapotherm 


 


O2 Flow Rate 30.00  30.00 





 80.00  80.00 





 7/12/22 7/12/22  





 08:00 08:18  


 


Temp 36.2   


 


Pulse 109   


 


Resp 12   


 


B/P (MAP) 162/95 (117)   


 


Pulse Ox 90 90  


 


O2 Delivery Vapotherm Vapotherm  


 


O2 Flow Rate 30.00 30.00  





 80.00   


 


FiO2  80  














 7/12/22





 00:00


 


Intake Total 0 ml


 


Output Total 1225 ml


 


Balance -1225 ml








Constitutional:  well-developed, other (non-verbal - nods head slowly in 

response to questions)


Respiratory:  No accessory muscle use, No respiratory distress; chest expansion 

is symmetric, chest is bilaterally symmetric, lungs clear to auscultation


Cardiovascular:  regular rate-rhythm; No JVD; tachycardia, S1 and S2


Gastrointestional:  No tender; soft, round, audible bowel sounds


Extremities:  no lower extremity edema bilateral


Neurologic/Psychiatric:  other (RUE and RLE flaccid, facial droop right sided; 

LLE and LUE chronically weak 4/5)


Skin:  No rash on exposed areas, No ulcerations on exposed areas





Results/Procedures:


Labs


Laboratory Tests


7/11/22 16:12: Glucometer 124H


7/11/22 20:33: Glucometer 116H


7/12/22 06:31: Glucometer 151H


7/12/22 08:50: 


White Blood Count 11.8H, Red Blood Count 4.86, Hemoglobin 13.5, Hematocrit 42, 

Mean Corpuscular Volume 86, Mean Corpuscular Hemoglobin 28, Mean Corpuscular 

Hemoglobin Concent 32, Red Cell Distribution Width 13.5, Platelet Count 315, 

Mean Platelet Volume 9.9, Immature Granulocyte % (Auto) 0, Neutrophils (%) 

(Auto) 76H, Lymphocytes (%) (Auto) 12, Monocytes (%) (Auto) 9, Eosinophils (%) 

(Auto) 3, Basophils (%) (Auto) 1, Neutrophils # (Auto) 8.9H, Lymphocytes # 

(Auto) 1.4, Monocytes # (Auto) 1.0, Eosinophils # (Auto) 0.3, Basophils # (Auto)

0.1, Immature Granulocyte # (Auto) 0.0, Sodium Level 141, Potassium Level 3.4L, 

Chloride Level 106, Carbon Dioxide Level 23, Anion Gap 12, Blood Urea Nitrogen 

10, Creatinine 0.67, Estimat Glomerular Filtration Rate 112, BUN/Creatinine 

Ratio 15, Glucose Level 131H, Calcium Level 8.8, Corrected Calcium 9.1, Total 

Bilirubin 1.5H, Aspartate Amino Transf (AST/SGOT) 12, Alanine Aminotransferase 

(ALT/SGPT) 12, Alkaline Phosphatase 77, Total Protein 6.6, Albumin 3.6





Laboratory Tests


7/11/22 05:30








7/12/22 08:50











A/P:


Assessment:


CVA with right sided hemiparesis, aphasia


- reported h/o several CVA/TIA's following COVID infection in 2021 with left 

sided weakness - presents this time with righ sided 

hemiparesis/aphasia/dysphagia


- The stroke team at  was consulted with no further recommendations other than

continuing aspirin and Plavix and obtaining an MRI


- Management per stroke team


- Follows with Dr. Ramirez of neurology services at [facility]


- Carotid u/s7-11-22:  Very limited exam. The right carotid artery shows no 

hemodynamic disease with minimal plaquing.  Left carotid system and vertebral 

arteries were not adequately evaluated due to patient's pain and position. Pr

evious CT angiography of the neck on 07/09/2022 suggested


the carotid and vertebral arteries to be widely patent without significant 

stenosis


- ILR implanted on 7/12/22 to eval for occult A Fib as the source of his CVA





?Angioedema


- Lisinopril stopped





Fall at home





Sleep apnea


- non-complaint with sleep study





? aspiration


- NPO





HTN





Crohn's dz


- h/o colectomy





DM 2


Plan:





CVA


- management per stroke team - please see recs per [facility] as above


- source undetermined - implanted ILR today to r/o possible a-fib/flutter as 

cause 


BP not well controlled


Remains NPO d/t concerns of aspiration - Continue IV Lopressor - start Catapress

patch


Monitor lab closely


Replace electrolytes











VICKY ROBERTS MD FACP FACC CCDS   Jul 12, 2022 11:37

## 2022-07-12 NOTE — OPERATIVE REPORT
DATE OF SERVICE:  07/12/2022



INDICATIONS:

The patient is a 51-year-old gentleman who has been admitted with a

cerebrovascular accident that is nonhemorrhagic and for which no clear source is

evident.  Implantable loop recorder implantation was carried out to evaluate for

occult atrial fibrillation as a source of his stroke.



DESCRIPTION OF PROCEDURE:

Informed consent was obtained.  The left prepectoral area was prepared and

draped in the usual sterile fashion.  Lidocaine 1% was used for local

anesthesia.  The tools provided with the [employer] LINQ device were used to make

a subcutaneous pocket anterior to the fourth intercostal space into which the

device was placed and the wound edges were closed using Dermabond and

Steri-Strips.  He tolerated the procedure well.  The serial number of the device

is [id number].





Job ID: [id number]

DocumentID: [id number]

Dictated Date:  07/12/2022 11:34:04

Transcription Date: 07/12/2022 17:12:59

Dictated By: VICKY ROBERTS MD, MA, FACP, FACC,

## 2022-07-12 NOTE — PHYSICAL THERAPY DAILY NOTE
PT Daily Note-Current


Subjective


Patient in bed pre tx, agrees to PT, has no complaints of pain.





Appearance


Patient in bed post tx with nurse call, phone, tray, family in room.





Mental Status


Patient Orientation:  Person, Unable to Assess, Non-Verbal/Aphasic


Attachments:  Oxygen (vapotherm), Nuñez Catheter, IV





Transfers


SCALE: Activities may be completed with or without assistive devices.





6-Indepedent-patient completes the activity by him/herself with no assistance 

from a helper.


5-Set-up or Clean-up Assistance-helper sets up or cleans up; patient completes 

activity. [place] assists only prior to or  


    following the activity.


4-Supervision or Touching Assistance-helper provides verbal cues and/or 

touching/steadying and/or contact guard assistance as patient completes 

activity. Assistance may be provided   


    throughout the activity or intermittently.


3-Partial/Moderate Assistance-helper does LESS THAN HALF the effort. [place] 

lifts, holds or supports trunk or limbs, but provides less than half the effort.


2-Substantial/Maximal Assistance-helper does MORE THAN HALF the effort. [place] 

lifts or holds trunk or limbs and provides more than half the effort.


5-Gzienwfqd-nbjrez does ALL the effort. Patient does none of the effort to 

complete the activity. Or, the assistance of 2 or more helpers is required for 

the patient to complete the  


    activity.


If activity was not attempted, code reason:


7-Patient Refused.


9-Not Applicable-not attempted and the patient did not perform the activity 

before the current illness, exacerbation or injury.


10-Not Attempted due to Environmental Limitations-(lack of equipment, weather 

restraints, etc.).


88-Not Attempted due to Medical Conditions or Safety Concerns.


Roll Left & Right (QC):  1


Sit to Lying (QC):  1


Lying to Sitting/Side of Bed(Q:  1


Patient dependent of 2 people for supine to sit, he is able to sit for several 

minutes, needs mod assist to maintain sitting balance, he cannot voluntarily 

move his right leg, he can assist sitting with his left arm, attempted to stand 

but it was dependent and patient couldn't even get his bottom off the bed.  

Dependent of 2 to lay back down and scoot up.





Treatments


bed mobility, sitting balance, attempted standing





Assessment


Current Status:  Poor Progress


Patient will most likely need a nursing home upon DC, he may be able to propel a

manual WC with some assist but will probably need a nilton for transfers.





PT Long Term Goals


Long Term Goals


PT Long Term Goals Time Frame:  Jul 18, 2022


Roll Left & Right (QC):  3


Sit to Lying (QC):  3


Lying-Sitting on Side/Bed(QC):  3


Sit to Stand (QC):  3


Chair/Bed-to-Chair Xfer(QC):  2





PT Plan


Problem List


Problem List:  Activity Tolerance, Functional Strength, Safety, Balance, Gait, 

Transfer, Bed Mobility, ROM





Treatment/Plan


Treatment Plan:  Continue Plan of Care


Treatment Plan:  Bed Mobility, Education, Functional Activity Promise, Functional 

Strength, Gait, Safety, Therapeutic Exercise, Transfers


Treatment Duration:  Jul 18, 2022


Frequency:  6 times per week


Estimated Hrs Per Day:  .25 hour per day


Patient and/or Family Agrees t:  Yes





Safety Risks/Education


Patient Education:  Correct Positioning, Safety Issues


Teaching Recipient:  Patient


Teaching Methods:  Demonstration, Discussion


Response to Teaching:  Reinforcement Needed





Time/GCodes


Time In:  0928


Time Out:  0945


Total Billed Treatment Time:  17


Total Billed Treatment


1 visit


FA ALYSSIA VELASQUEZ PT                Jul 12, 2022 10:10

## 2022-07-12 NOTE — PROGRESS NOTE - HOSPITALIST
FRANCO PAZ MED STUDENT 7/12/22 1110:


Subjective


HPI/CC On Admission


Date Seen by Provider:  Jul 12, 2022


Time Seen by Provider:  08:15


Patient is a 52yo male, h/o COVID about 1.5y ago and multiple neurologic insults

during hospitalization for Covid at that time (TIA vs strokes).  He has had 

chronic debility since then with several ED visits for weakness and strokelike 

symptoms.  Apparently his sons were at home with him and he had a fall in the 

bathroom with increased weakness to the left side.  Unsure of LOC as sons are 

not here at this time for history.  The patient (with significantly slurred 

speech) is able to tell me that his wife is working in [place] - which is 

consistent with prior visits to our ER.  He does state he has a mild headache.  

Otherwise his speech is so slurred he is very difficult to understand.


Upon my arrival patient exhibited right neglect tongue was sticking out mildly 

swollen without erythema.  The patient is eyes were open and did orient to voice

he was slow to respond and very dysarthric unintelligible speech.  He was able 

to follow simple commands but when asked to use his right hand to touch my 

finger he only moved his left hand and was able to do so with good control of 

the left hand.  His wife came later in the day and reports that he had been 

seeing neurologist Dr. Loja had a rather extensive work-up was started on 

aspirin and Plavix but no apparent large vessel disease was noted.  He had an 

echocardiogram gram and there is been no evidence for atrial fibrillation.  He 

was having no neurologic issues previous to COVID infection in January 2021.  He

had been doing well at home until the night of his admission when his right leg 

apparently gave out he stumbled forward they helped him to the bed and again 

noted that he was dysarthric and his tongue appeared to be swollen.  There were 

no rash issues reported he was confused but there was no loss of consciousness 

and no reported pallor.  He had been feeling well up until that point.


Subjective/Events-last exam


Pt making small, but impressive improvements today with speech and strength. Pt 

was able to wave with left hand and say "goodbye" today. His speech was 

unintelligable yesterday, but is much improved today. Pt's wife reports she is 

pleased with how well he is doing. Pt denies pain or discomfort. Wife has 

concerns of thick mucus production that they have been suctioning. Pt is still 

NPO following speech therapy eval.





Review of Systems


General:  No Chills, No Fatigue


HEENT:  No Head Aches, No Visual Changes


Pulmonary:  No Dyspnea, No Cough


Cardiovascular:  No: Chest Pain, Palpitations


Gastrointestinal:  No: Nausea, Vomiting


Genitourinary:  No Dysuria, No Frequency


Neurological:  Weakness, Change in speech (improved)





Objective


Exam


Vital Signs





Vital Signs








  Date Time  Temp Pulse Resp B/P (MAP) Pulse Ox O2 Delivery O2 Flow Rate FiO2


 


7/12/22 08:18     90 Vapotherm 30.00 80


 


7/12/22 08:00 36.2 109 12 162/95 (117)    





Capillary Refill : Less Than 3 Seconds


General Appearance:  No Apparent Distress, Obese


HEENT:  PERRL/EOMI, Other (tongue edema and protrusion improved from yesterday)


Respiratory:  Chest Non Tender, No Accessory Muscle Use, No Respiratory 

Distress, Rhonci (in upper lobes bilaterally), Other (lower lobes CTAB)


Cardiovascular:  Regular Rate, Rhythm, No Murmur


Gastrointestinal:  Normal Bowel Sounds, Non Tender, Soft


Extremity:  Normal Capillary Refill, No Pedal Edema


Neurologic/Psychiatric:  Alert, Oriented x3, Motor Weakness, Other


Skin:  Normal Color, Warm/Dry


Lymphatic:  No Adenopathy





Results/Procedures


Lab


Laboratory Tests


7/12/22 08:50








Patient resulted labs reviewed.





Assessment/Plan


Assessment and Plan


Assess & Plan/Chief Complaint


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


Angioedema of tongue


HTN





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


   -Carotid ultrasound unremarkable


   -Cardiology consulted, appreciate their recommendations


   -CT head showed atrophy and chronic ischemic microvascular changes with lunar

infarcts in Left basal ganglia


   -CTA showed no large vessel occlusion


   -MRI recommended, but couldn't be done d/t body habitus


   -Speech therapy, Physical therapy and Occupational therapy


   -Inpatient Rehab Facility eval  


   -NPO


Angioedema of tongue


   -Hold lisinopril


   -Speech therapy advised NPO


HTN


   -Permissive HTN following stroke


   -Improved


Diabetes


   -SSI





Diet-NPO


DVT prophylaxis- SCDs





GILDA HERNADEZ DO 7/13/22 0549:


Subjective


Subjective/Events-last exam


Pt is about the same


More coherent and expressive aphasia improved


Maintain on Vapotherm


Hemoglobin was 11.8


BP was 130/86


In-patient rehab submitted to insurance


Review of Systems


General:  Fatigue, Malaise


HEENT:  Dysphasia


Neurological:  Change in speech (improved)





Objective


Exam


General Appearance:  No Apparent Distress, WD/WN, Chronically ill, Obese


Respiratory:  Lungs Clear, Normal Breath Sounds


Cardiovascular:  Regular Rate, Rhythm





Assessment/Plan


Assessment and Plan


Assess & Plan/Chief Complaint


Speech therapy


Aspiration risk


IRF





Supervisory-Addendum Brief


Verification & Attestation


Participated in pt care:  history, MDM, physical


Personally performed:  exam, history, MDM, supervision of care


Care discussed with:  Medical Student


Procedures:  n/a


Results interpretation:  Verified all documentation


Verification and Attestation of Medical Student E/M Service





A medical student performed and documented this service in my presence. I 

reviewed and verified all information documented by the medical student and made

modifications to such information, when appropriate. I personally performed the 

physical exam and medical decision making. 





 Gilda Hernadez, Jul 13, 2022,05:48











FRANCO PAZ MED STUDENT    Jul 12, 2022 11:10


GILDA HERNADEZ DO                Jul 13, 2022 05:49

## 2022-07-12 NOTE — PROGRESS NOTE - CARDIOLOGY
Cardiology SOAP Progress Note


Subjective:


Spouse at the bedside


He is using a communication board


Remains non-verbal


No c/o pain, CP or SOB


Remains NPO





Objective:


I&O/Vital Signs











 7/12/22 7/13/22 7/13/22 7/13/22





 22:12 00:00 00:25 00:40


 


Temp  36.5  


 


Pulse  101  


 


Resp  22  


 


B/P (MAP)  171/91 (117) 167/83 (111) 169/97 (121)


 


Pulse Ox 93 95  


 


O2 Delivery Vapotherm Vapotherm  


 


O2 Flow Rate 30.00 30.00  





  80.00  


 


FiO2 80   


 


    





 7/13/22 7/13/22 7/13/22 7/13/22





 01:00 02:14 04:00 04:50


 


Temp   36.9 


 


Pulse 97  93 


 


Resp   16 


 


B/P (MAP)   149/83 (105) 


 


Pulse Ox  90 92 92


 


O2 Delivery  Vapotherm Vapotherm Vapotherm


 


O2 Flow Rate  30.00 30.00 40.00





   80.00 


 


FiO2  80  100


 


    





 7/13/22 7/13/22 7/13/22 





 07:00 07:12 08:00 


 


Temp   37.1 


 


Pulse 96  92 


 


Resp   20 


 


B/P (MAP)   153/85 (107) 


 


Pulse Ox   95 


 


O2 Delivery  Vapotherm Vapotherm 


 


O2 Flow Rate  40.00 40.00 





  95.00 95.00 














 7/13/22





 00:00


 


Intake Total 100 ml


 


Output Total 950 ml


 


Balance -850 ml








Constitutional:  well-developed, other (non-verbal - nods head slowly in 

response to questions)


Respiratory:  No accessory muscle use, No respiratory distress; chest expansion 

is symmetric, chest is bilaterally symmetric, lungs clear to auscultation


Cardiovascular:  regular rate-rhythm; No JVD; tachycardia, S1 and S2


Gastrointestional:  No tender; soft, round, audible bowel sounds


Extremities:  no lower extremity edema bilateral


Neurologic/Psychiatric:  other (RUE and RLE flaccid, facial droop right sided; 

LLE and LUE chronically weak 4/5)


Skin:  No rash on exposed areas, No ulcerations on exposed areas





Results/Procedures:


Labs


Laboratory Tests


7/12/22 12:00: Glucometer 129H


7/12/22 15:28: Glucometer 114H


7/12/22 20:36: Glucometer 98


7/12/22 23:32: Glucometer 99


7/13/22 06:08: 


White Blood Count 8.8, Red Blood Count 4.70, Hemoglobin 13.1L, Hematocrit 40, 

Mean Corpuscular Volume 86, Mean Corpuscular Hemoglobin 28, Mean Corpuscular 

Hemoglobin Concent 33, Red Cell Distribution Width 13.2, Platelet Count 312, 

Mean Platelet Volume 10.1, Immature Granulocyte % (Auto) 0, Neutrophils (%) 

(Auto) 68, Lymphocytes (%) (Auto) 17, Monocytes (%) (Auto) 11, Eosinophils (%) 

(Auto) 3, Basophils (%) (Auto) 1, Neutrophils # (Auto) 6.0, Lymphocytes # (Auto)

1.5, Monocytes # (Auto) 1.0, Eosinophils # (Auto) 0.2, Basophils # (Auto) 0.1, 

Immature Granulocyte # (Auto) 0.0, Sodium Level 139, Potassium Level 3.3L, 

Chloride Level 106, Carbon Dioxide Level 24, Anion Gap 9, Blood Urea Nitrogen 

10, Creatinine 0.61, Estimat Glomerular Filtration Rate 116, BUN/Creatinine 

Ratio 16, Glucose Level 108H, Calcium Level 8.7, Corrected Calcium 9.1, 

Magnesium Level 1.8, Total Bilirubin 1.6H, Aspartate Amino Transf (AST/SGOT) 10,

Alanine Aminotransferase (ALT/SGPT) 8, Alkaline Phosphatase 77, Total Protein 

6.8, Albumin 3.5





Microbiology


7/12/22 MRSA Screen - Final, Complete


          MRSA not isolated








A/P:


Assessment:


CVA with right sided hemiparesis, aphasia


- reported h/o several CVA/TIA's following COVID infection in 2021 with left 

sided weakness - presents this time with righ sided 

hemiparesis/aphasia/dysphagia


- The stroke team at  was consulted with no further recommendations other than

continuing aspirin and Plavix and obtaining an MRI


- Management per stroke team


- Follows with Dr. Ramirez of neurology services at [facility]


- Carotid u/s7-11-22:  Very limited exam. The right carotid artery shows no 

hemodynamic disease with minimal plaquing.  Left carotid system and vertebral 

arteries were not adequately evaluated due to patient's pain and position. 

Previous CT angiography of the neck on 07/09/2022 suggested


the carotid and vertebral arteries to be widely patent without significant 

stenosis.





?Angioedema


- Lisinopril stopped





Fall at home





Sleep apnea


- non-complaint with sleep study





? aspiration


- NPO





HTN





Crohn's dz


- h/o colectomy





DM 2


Plan:


CVA


- management per stroke team - please see recs per [facility] as above


- source undetermined - agreeable to implant of ILR to r/o possible a-

fib/flutter as cause - planned for later today


BP not well controlled


Remains NPO d/t concerns of aspiration - Continue IV Lopressor - start Catapress

patch


Monitor lab closely


Replace electrolytes











ROXANNE BRADY [facility]           Jul 12, 2022 09:48

## 2022-07-12 NOTE — ST COGNITIVE LINGUISTIC EVAL
Speech Evaluation-General


Medical Diagnosis


CVA


Onset Date:  Jul 10, 2022





Therapy Diagnosis


Therapy Diagnosis:  Severe Dysarthria, Moderate Expressive Aphasia





Precautions


Precautions:  Fall, Aspiration


Precautions/Isolations:  Aspiration, Fall Prevention, Standard Precautions





Referral


Referring Physician:  Dr. Castro


Reason for Referral:  Evaluation/Treatment





Medical History


Current History


The patient is a 51 year-old male with a past medical history of COVID, TIA, 

strokes, diabetes, HTN, and Crohn's disease, who presented to [facility] 

[facility] with right sided weakness and slurred speech. 





Head CT: 7/9/2022: IMPRESSION: Atrophy and some mild chronic microvascular 

ischemic disease with a few lacunar infarcts, particularly in the left basal 

ganglia and to a lesser degree right basal ganglia. No other acute intracranial 

abnormality. If there is high clinical concern for an acute CVA further evalu

ation with MRI should be considered. 





CXR: 7/11/2022: Left lung volume loss and nonspecific infiltrates, likely a 

combination of pneumonia and partial atelectasis.


Reviewed History:  Yes





Social History


Current Living Status:  Spouse





Speech PLF-Current Status


Prior Level of Function





Per patient's wife, the patient was able to fluently communicate verbally


prior to his recent admission. The patient's wife does report frequent


coughing and choking on his own secretions.





Subjective


The patient was positioned upright in bed for increased alertness. The patient 

was agreeable to participation in the cognitive linguistic evaluation. The 

patient displays a left head tilt and left eye gaze. Right side neglect is 

evident throughout spontaneous conversation and interactions. The patient 

attempts to hold his head at midline with his left hand, however, is mostly 

unsuccessful. Per patient's wife, the patient displayed a preference to a left 

head tilt positioning due to his current lift chair.





The patient's wife and family member remain present for the evaluation.





Language Eval: Auditory


Comprehends Simple Yes/No Ques:  Functional


Follows 1-Step Commands:  Functional (With additional response time provided.)





Language Eval: Verbal Language


Completes Spontaneous Greeting:  Functional (Does lift hand in a "wave" 

position.)


Imitates Simple Words/Phrases:  Moderate (The patient attempts single word 

repetition.)


Requests Basic Needs:  Moderate (With the use of yes/no questions or the 

communication board for spelling.)





Objective Cognitive Domain


Cognitive evaluation is deferred at this time pending an improvement to the 

patient's expressive language and overall communication.





Objective


Oral Motor/Speech Production


The patient displayed severe dysarthria on this date, slightly improved from 

verbal language attempts the day prior. The patient presents with a right facial

droop, decreased right labial retraction and protrusion. Lingual protrusion is 

slightly to the left and appears edematous, however, the patient's wife states 

the patient's tongue is at baseline. Markedly decreased articulatory precision. 

The patient's intelligibility is further decreased due to the presence of his 

own secretions displaying anterior spillage. At this time, the patient is judged

to be less than 20% intelligible in the known, single word context.


Impression


The patient displayed severe dysarthria and moderate expressive language 

impairments. The patient is able to follow simple, one-step commands with 

repetition and increased processing time. 





The patient is able to communicate his wants and needs through spelling, writin

g, and yes/no questions/responses. 





At this time, the patient's progress is most appropriate for skilled nursing 

versus long-term acute care placement.





Speech Short Term Goals


Short Term Goals


Short Term Goals


1. The patient will tolerate the least restrictive consistency of P.O. trials 

for possible advancement of a P.O. diet consistency.





2. The patient will demonstrate intelligibility strategies with 50% accuracy and

maximum clinician verbal and visual cueing.


Time Frame-STG:  Three Weeks.





Speech Long Term Goals


Long Term Goals


1. The patient will tolerate the least restrictive consistency without s/s of 

suspected aspiration.





2. The patient will improve expressive communication for increased safety with 

discharge to the least restricted environment.


Time Frame:  One Week.





Speech-Plan


Treatment Plan


Speech Therapy Treatment Plan:  Continue Plan of Care


Treatment Duration:  Jul 25, 2022


Frequency:  4 times per week (Four to five times per week.)


Estimated Hrs Per Day:  .25 hour per day


Rehab Potential:  Poor





Safety Risks/Education


Teaching Recipient:  Patient, Family, Significant Other


Teaching Methods:  Discussion


Response to Teaching:  Verbalize Understanding


Education Topics Provided:  


Results, Recommendations, Plan of Care





Time


Speech Therapy Time In:  09:10


Speech Therapy Time Out:  09:30


Total Billed Time:  20


Billed Treatment Time


1, BETTIE MEJIAYEZRA               Jul 12, 2022 10:09

## 2022-07-13 VITALS — SYSTOLIC BLOOD PRESSURE: 169 MMHG | DIASTOLIC BLOOD PRESSURE: 97 MMHG

## 2022-07-13 VITALS — DIASTOLIC BLOOD PRESSURE: 104 MMHG | SYSTOLIC BLOOD PRESSURE: 169 MMHG

## 2022-07-13 VITALS — DIASTOLIC BLOOD PRESSURE: 81 MMHG | SYSTOLIC BLOOD PRESSURE: 143 MMHG

## 2022-07-13 VITALS — DIASTOLIC BLOOD PRESSURE: 83 MMHG | SYSTOLIC BLOOD PRESSURE: 167 MMHG

## 2022-07-13 VITALS — SYSTOLIC BLOOD PRESSURE: 167 MMHG | DIASTOLIC BLOOD PRESSURE: 96 MMHG

## 2022-07-13 VITALS — DIASTOLIC BLOOD PRESSURE: 83 MMHG | SYSTOLIC BLOOD PRESSURE: 149 MMHG

## 2022-07-13 VITALS — DIASTOLIC BLOOD PRESSURE: 91 MMHG | SYSTOLIC BLOOD PRESSURE: 171 MMHG

## 2022-07-13 VITALS — SYSTOLIC BLOOD PRESSURE: 180 MMHG | DIASTOLIC BLOOD PRESSURE: 105 MMHG

## 2022-07-13 VITALS — DIASTOLIC BLOOD PRESSURE: 85 MMHG | SYSTOLIC BLOOD PRESSURE: 153 MMHG

## 2022-07-13 VITALS — SYSTOLIC BLOOD PRESSURE: 143 MMHG | DIASTOLIC BLOOD PRESSURE: 81 MMHG

## 2022-07-13 VITALS — DIASTOLIC BLOOD PRESSURE: 102 MMHG | SYSTOLIC BLOOD PRESSURE: 134 MMHG

## 2022-07-13 VITALS — SYSTOLIC BLOOD PRESSURE: 174 MMHG | DIASTOLIC BLOOD PRESSURE: 108 MMHG

## 2022-07-13 VITALS — SYSTOLIC BLOOD PRESSURE: 153 MMHG | DIASTOLIC BLOOD PRESSURE: 94 MMHG

## 2022-07-13 LAB
ALBUMIN SERPL-MCNC: 3.5 GM/DL (ref 3.2–4.5)
ALP SERPL-CCNC: 77 U/L (ref 40–136)
ALT SERPL-CCNC: 8 U/L (ref 0–55)
ARTERIAL PATENCY WRIST A: (no result)
BASE EXCESS STD BLDA CALC-SCNC: 1.7 MMOL/L (ref -2.5–2.5)
BASOPHILS # BLD AUTO: 0.1 10^3/UL (ref 0–0.1)
BASOPHILS NFR BLD AUTO: 1 % (ref 0–10)
BDY SITE: (no result)
BILIRUB SERPL-MCNC: 1.6 MG/DL (ref 0.1–1)
BODY TEMPERATURE: 37
BUN/CREAT SERPL: 16
CALCIUM SERPL-MCNC: 8.7 MG/DL (ref 8.5–10.1)
CHLORIDE SERPL-SCNC: 106 MMOL/L (ref 98–107)
CO2 BLDA CALC-SCNC: 28.5 MMOL/L (ref 21–31)
CO2 SERPL-SCNC: 24 MMOL/L (ref 21–32)
CREAT SERPL-MCNC: 0.61 MG/DL (ref 0.6–1.3)
EOSINOPHIL # BLD AUTO: 0.2 10^3/UL (ref 0–0.3)
EOSINOPHIL NFR BLD AUTO: 3 % (ref 0–10)
GFR SERPLBLD BASED ON 1.73 SQ M-ARVRAT: 116 ML/MIN
GLUCOSE SERPL-MCNC: 108 MG/DL (ref 70–105)
HCT VFR BLD CALC: 40 % (ref 40–54)
HGB BLD-MCNC: 13.1 G/DL (ref 13.3–17.7)
LYMPHOCYTES # BLD AUTO: 1.5 10^3/UL (ref 1–4)
LYMPHOCYTES NFR BLD AUTO: 17 % (ref 12–44)
MAGNESIUM SERPL-MCNC: 1.8 MG/DL (ref 1.6–2.4)
MANUAL DIFFERENTIAL PERFORMED BLD QL: NO
MCH RBC QN AUTO: 28 PG (ref 25–34)
MCHC RBC AUTO-ENTMCNC: 33 G/DL (ref 32–36)
MCV RBC AUTO: 86 FL (ref 80–99)
MONOCYTES # BLD AUTO: 1 10^3/UL (ref 0–1)
MONOCYTES NFR BLD AUTO: 11 % (ref 0–12)
NEUTROPHILS # BLD AUTO: 6 10^3/UL (ref 1.8–7.8)
NEUTROPHILS NFR BLD AUTO: 68 % (ref 42–75)
PCO2 BLDA: 52 MMHG (ref 35–45)
PH BLDA: 7.34 [PH] (ref 7.37–7.43)
PLATELET # BLD: 312 10^3/UL (ref 130–400)
PMV BLD AUTO: 10.1 FL (ref 9–12.2)
PO2 BLDA: 50 MMHG (ref 79–93)
POTASSIUM SERPL-SCNC: 3.3 MMOL/L (ref 3.6–5)
PROT SERPL-MCNC: 6.8 GM/DL (ref 6.4–8.2)
SAO2 % BLDA FROM PO2: 86 % (ref 94–100)
SODIUM SERPL-SCNC: 139 MMOL/L (ref 135–145)
VENTILATION MODE VENT: NO
WBC # BLD AUTO: 8.8 10^3/UL (ref 4.3–11)

## 2022-07-13 RX ADMIN — INSULIN ASPART SCH UNIT: 100 INJECTION, SOLUTION INTRAVENOUS; SUBCUTANEOUS at 21:06

## 2022-07-13 RX ADMIN — SODIUM CHLORIDE SCH MLS/HR: 900 INJECTION, SOLUTION INTRAVENOUS at 10:25

## 2022-07-13 RX ADMIN — METOPROLOL TARTRATE SCH MG: 1 INJECTION, SOLUTION INTRAVENOUS at 18:16

## 2022-07-13 RX ADMIN — EMPAGLIFLOZIN SCH MG: 10 TABLET, FILM COATED ORAL at 20:49

## 2022-07-13 RX ADMIN — POTASSIUM CHLORIDE SCH MLS/HR: 200 INJECTION, SOLUTION INTRAVENOUS at 11:34

## 2022-07-13 RX ADMIN — ASPIRIN SCH MG: 81 TABLET ORAL at 09:21

## 2022-07-13 RX ADMIN — FAMOTIDINE SCH MG: 20 TABLET, FILM COATED ORAL at 09:21

## 2022-07-13 RX ADMIN — ENOXAPARIN SODIUM SCH MG: 100 INJECTION SUBCUTANEOUS at 13:38

## 2022-07-13 RX ADMIN — POTASSIUM CHLORIDE SCH MLS/HR: 200 INJECTION, SOLUTION INTRAVENOUS at 13:38

## 2022-07-13 RX ADMIN — FAMOTIDINE SCH MG: 20 TABLET, FILM COATED ORAL at 20:49

## 2022-07-13 RX ADMIN — POTASSIUM CHLORIDE SCH MLS/HR: 200 INJECTION, SOLUTION INTRAVENOUS at 14:48

## 2022-07-13 RX ADMIN — SODIUM CHLORIDE SCH MLS/HR: 900 INJECTION, SOLUTION INTRAVENOUS at 21:43

## 2022-07-13 RX ADMIN — POTASSIUM CHLORIDE SCH MLS/HR: 200 INJECTION, SOLUTION INTRAVENOUS at 17:01

## 2022-07-13 RX ADMIN — INSULIN ASPART SCH UNIT: 100 INJECTION, SOLUTION INTRAVENOUS; SUBCUTANEOUS at 17:06

## 2022-07-13 RX ADMIN — INSULIN ASPART SCH UNIT: 100 INJECTION, SOLUTION INTRAVENOUS; SUBCUTANEOUS at 13:34

## 2022-07-13 RX ADMIN — METOPROLOL TARTRATE SCH MG: 1 INJECTION, SOLUTION INTRAVENOUS at 13:38

## 2022-07-13 RX ADMIN — ENOXAPARIN SODIUM SCH MG: 100 INJECTION SUBCUTANEOUS at 00:25

## 2022-07-13 RX ADMIN — METOPROLOL TARTRATE SCH MG: 1 INJECTION, SOLUTION INTRAVENOUS at 00:25

## 2022-07-13 RX ADMIN — METOPROLOL TARTRATE SCH MG: 1 INJECTION, SOLUTION INTRAVENOUS at 06:58

## 2022-07-13 RX ADMIN — INSULIN ASPART SCH UNIT: 100 INJECTION, SOLUTION INTRAVENOUS; SUBCUTANEOUS at 07:03

## 2022-07-13 RX ADMIN — CLOPIDOGREL BISULFATE SCH MG: 75 TABLET, FILM COATED ORAL at 09:21

## 2022-07-13 NOTE — SPEECH THERAPY DAILY NOTE
Speech Daily Progress Note


Subjective


Date Seen by Provider:  Jul 13, 2022


Time Seen by Provider:  08:25


The patient was lying in bed, awake and alert upon entrance to his room by the 

clinician. The patient made immediate eye contact following a verbal greeting 

from the clinician. The patient's wife remains at bedside. The patient was 

agreeable to participation in the skilled speech pathology treatment session. 





The patient continues to use the Yankeur to clear oral secretions, with visible 

anterior loss of oral secretions present. The patient additionally displays ri

gorous coughing following "dry" swallow attempts which the clinician believes is

secondary to aspiration of his own secretions.





The patient remains on 40 lpm with 95% via high flow nasal cannula with SpO2% at

94% prior to P.O. trials.





Objective


The patient's mouth we cleaned with a moist oral swab and suctioning prior to 

administration of P.O. bolus trials. The patient's vocal quality is "wet" at 

baseline. The patient is provided three ice chips, three teaspoons of water, and

three coated teaspoons of puree.





The patient displayed a delayed, rigorous cough, red face, and watery eyes 

following each ice chip and teaspoon of water trial. Additionally, SpO2% 

decreased to 89%. Following a return to baselined SpO2% levels, coated teaspoons

of puree were administered. The patient demonstrated an immediate, rigorous 

cough, red face, and watery eyes with one of three puree trials. The patient 

orally held each puree trial for approximately 35 seconds prior to minimal 

lingual and oral manipulation. The patient's vocal quality was overtly "wet" 

following P.O. trials. P.O. trials were terminated at this time for patient safe

ty and due to the overt s/s of suspected aspiration displayed with each trial.





The patient continues to communicate wants and needs efficiently through non-

verbal gestures, a communication board (spelling), and yes/no responses. The 

patient is able to vocalize following requests from the clinician and was 

encouraged to continue expressive verbal communication attempts regardless of 

the dysarthria present.





The clinician discussed with the patient's wife and the patient the continued 

recommendations of N.P.O., the minimal progress with the oropharyngeal swallow 

function, and possible timeline of rehabilitation. Per patient's wife, the 

patient's swallowing function has been in it's current state for the past "17 

months" since his initial stroke. Due to this information, the clinician 

suspects prolonged rehabilitation with a guarded outcome.





The clinician continues to recommend:


- Strict N.P.O.


- Frequent and excellent oral care to reduce the transfer of oral bacteria to 

the lungs should aspiration of secretions occur.


- Moist oral swabs (with the water removed) for oral comfort.


- The patient should attempt to swallow his own secretions, relying less on the 

Yankeur. as his own secretions are excellent oropharyngeal swallowing 

rehabilitation.


- Speech pathology to re-assess the oropharyngeal swallowing function daily.





The patient's primary physician and RN were present following the session and 

agreed to initiated discussions and plans for possible PEG tube placement. The 

clinician agrees with this plan of care at this time.





Assessment


Assessment Current Status:  Poor Progress





Treatment Plan


Continue Plan of Care





Speech Short Term Goals


Short Term Goals


Short Term Goals


1. The patient will tolerate the least restrictive consistency of P.O. trials 

for possible advancement of a P.O. diet consistency.





2. The patient will demonstrate intelligibility strategies with 50% accuracy and

maximum clinician verbal and visual cueing.


Time Frame-STG:  Three Weeks.





Speech Long Term Goals


Long Term Goals


1. The patient will tolerate the least restrictive consistency without s/s of 

suspected aspiration.





2. The patient will improve expressive communication for increased safety with 

discharge to the least restricted environment.


Time Frame:  One Week.





Speech-Plan


Treatment Plan


Speech Therapy Treatment Plan:  Continue Plan of Care


Treatment Duration:  Jul 25, 2022


Frequency:  4 times per week (Four to five times per week.)


Estimated Hrs Per Day:  .25 hour per day


Rehab Potential:  Poor





Safety Risks/Education


Teaching Recipient:  Patient, Significant Other


Teaching Methods:  Discussion


Response to Teaching:  Verbalize Understanding


Education Topics Provided:  


Results, Recommendations, Plan of Care





Time


Speech Therapy Time In:  08:25


Speech Therapy Time Out:  09:25


Total Billed Time:  60


Billed Treatment Time


1, BETTIE ESPARZA [address]               Jul 13, 2022 09:26

## 2022-07-13 NOTE — PROGRESS NOTE - CARDIOLOGY
Cardiology SOAP Progress Note


Subjective:


Communication difficult due to aphasia


Does not indicate cp or shortness of breath





Objective:


I&O/Vital Signs











 7/13/22 7/13/22 7/13/22 7/13/22





 01:00 02:14 04:00 04:50


 


Temp   36.9 


 


Pulse 97  93 


 


Resp   16 


 


B/P (MAP)   149/83 (105) 


 


Pulse Ox  90 92 92


 


O2 Delivery  Vapotherm Vapotherm Vapotherm


 


O2 Flow Rate  30.00 30.00 40.00





   80.00 


 


FiO2  80  100


 


    





 7/13/22 7/13/22 7/13/22 7/13/22





 07:00 07:12 08:00 12:00


 


Temp   37.1 36.3


 


Pulse 96  92 107


 


Resp   20 14


 


B/P (MAP)   153/85 (107) 180/105 (130)


 


Pulse Ox   95 95


 


O2 Delivery  Vapotherm Vapotherm Vapotherm


 


O2 Flow Rate  40.00 40.00 40.00





  95.00 95.00 95.00


 


    





 7/13/22   





 12:38   


 


Pulse 96   


 


Resp 21   


 


Pulse Ox 97   


 


O2 Flow Rate 100.00   














 7/13/22





 00:00


 


Intake Total 100 ml


 


Output Total 950 ml


 


Balance -850 ml








Constitutional:  well-developed, other (non-verbal - nods head slowly in 

response to questions)


Respiratory:  No accessory muscle use, No respiratory distress; chest expansion 

is symmetric, chest is bilaterally symmetric, lungs clear to auscultation


Cardiovascular:  regular rate-rhythm; No JVD; tachycardia, S1 and S2


Gastrointestional:  No tender; soft, round, audible bowel sounds


Extremities:  no lower extremity edema bilateral


Neurologic/Psychiatric:  other (RUE and RLE flaccid, facial droop right sided; 

LLE and LUE chronically weak 4/5; aphasic)


Skin:  No rash on exposed areas, No ulcerations on exposed areas





Results/Procedures:


Labs


Laboratory Tests


7/12/22 15:28: Glucometer 114H


7/12/22 20:36: Glucometer 98


7/12/22 23:32: Glucometer 99


7/13/22 06:08: 


White Blood Count 8.8, Red Blood Count 4.70, Hemoglobin 13.1L, Hematocrit 40, 

Mean Corpuscular Volume 86, Mean Corpuscular Hemoglobin 28, Mean Corpuscular 

Hemoglobin Concent 33, Red Cell Distribution Width 13.2, Platelet Count 312, 

Mean Platelet Volume 10.1, Immature Granulocyte % (Auto) 0, Neutrophils (%) 

(Auto) 68, Lymphocytes (%) (Auto) 17, Monocytes (%) (Auto) 11, Eosinophils (%) 

(Auto) 3, Basophils (%) (Auto) 1, Neutrophils # (Auto) 6.0, Lymphocytes # (Auto)

1.5, Monocytes # (Auto) 1.0, Eosinophils # (Auto) 0.2, Basophils # (Auto) 0.1, 

Immature Granulocyte # (Auto) 0.0, Sodium Level 139, Potassium Level 3.3L, 

Chloride Level 106, Carbon Dioxide Level 24, Anion Gap 9, Blood Urea Nitrogen 

10, Creatinine 0.61, Estimat Glomerular Filtration Rate 116, BUN/Creatinine 

Ratio 16, Glucose Level 108H, Calcium Level 8.7, Corrected Calcium 9.1, 

Magnesium Level 1.8, Total Bilirubin 1.6H, Aspartate Amino Transf (AST/SGOT) 10,

Alanine Aminotransferase (ALT/SGPT) 8, Alkaline Phosphatase 77, Total Protein 

6.8, Albumin 3.5


7/13/22 10:34: 


Blood Gas Puncture Site RIGHT RAD, Blood Gas Patient Temperature 37, Arterial 

Blood pH 7.34*L, Arterial Blood Partial Pressure CO2 52H, Arterial Blood Partial

Pressure O2 50L, Arterial Blood HCO3 27, Arterial Blood Total CO2 28.5, Arterial

Blood Oxygen Saturation 86L, Arterial Blood Base Excess 1.7, [place] Test YES-POS,

Blood Gas Ventilator Setting NO, Blood Gas Inspired Oxygen 95%


7/13/22 12:48: Glucometer 118H





Microbiology


7/12/22 MRSA Screen - Final, Complete


          MRSA not isolated








A/P:


Assessment:


CVA with right sided hemiparesis, aphasia, dysphagia


- reported h/o several CVA/TIA's following COVID infection in 2021 with left 

sided weakness - presents this time with righ sided 

hemiparesis/aphasia/dysphagia


- The stroke team at  was consulted with no further recommendations other than

continuing aspirin and Plavix and obtaining an MRI


- Management per stroke team


- Follows with Dr. Ramirez of neurology services at [facility]


- Carotid u/s7-11-22:  Very limited exam. The right carotid artery shows no 

hemodynamic disease with minimal plaquing.  Left carotid system and vertebral 

arteries were not adequately evaluated due to patient's pain and position. 

Previous CT angiography of the neck on 07/09/2022 suggested


the carotid and vertebral arteries to be widely patent without significant 

stenosis


- ILR implanted on 7/12/22 to eval for occult A Fib as the source of his CVA





?Angioedema


- Lisinopril stopped





Fall at home





Sleep apnea


- non-complaint with sleep study





? aspiration


- NPO





HTN





Crohn's dz


- h/o colectomy





DM 2


Plan:





CVA


- management per stroke team - please see recs per [facility] as above


- source undetermined - implanted ILR 7-12-22 to r/o possible a-fib/flutter as 

cause 


BP not well controlled


Remains NPO d/t concerns of aspiration - Continue IV Lopressor - start Catapress

patch


- advise NG or G-tube placement of administration of nourishment and medications


- management per medical services


Monitor lab closely


Replace electrolytes











VICKY ROBERTS MD FACP [facility]   Jul 13, 2022 12:59

## 2022-07-13 NOTE — PROGRESS NOTE - CARDIOLOGY
Cardiology SOAP Progress Note


Subjective:


No new c/o


Remains NPO d/t dysphagia





Objective:


I&O/Vital Signs











 7/14/22 7/14/22 7/14/22 7/15/22





 21:00 22:10 23:55 01:00


 


Pulse  85 86 81


 


Resp  25 17 


 


B/P (MAP)   149/95 (113) 


 


Pulse Ox  95 95 


 


O2 Delivery NIV Bilevel  NIV Bilevel 


 


O2 Flow Rate  40.00  


 


FiO2 45   





 7/15/22 7/15/22 7/15/22 7/15/22





 02:20 03:57 04:30 04:33


 


Temp  36.3  


 


Pulse 86 80  


 


Resp 18 17  


 


B/P (MAP)  136/89 (105)  


 


Pulse Ox 98 93 95 


 


O2 Delivery  NIV Bilevel Vapotherm Vapotherm


 


O2 Flow Rate 40.00  35.00 35.00





    70.00


 


FiO2   70 


 


    





 7/15/22 7/15/22 7/15/22 7/15/22





 04:37 07:00 07:49 07:53


 


Temp    36.9


 


Pulse  90  93


 


Resp    16


 


B/P (MAP)    157/95 (115)


 


Pulse Ox   97 95


 


O2 Delivery Vapotherm  Vapotherm 


 


O2 Flow Rate 35.00  35.00 





 70.00   


 


FiO2   70 














 22





 23:59


 


Output Total 1250 ml


 


Balance -1250 ml








Constitutional:  well-developed, other (non-verbal - nods head slowly in 

response to questions)


Respiratory:  No accessory muscle use, No respiratory distress; chest expansion 

is symmetric, chest is bilaterally symmetric, lungs clear to auscultation


Cardiovascular:  regular rate-rhythm; No JVD; tachycardia, S1 and S2


Gastrointestional:  No tender; soft, round, audible bowel sounds


Extremities:  no lower extremity edema bilateral


Neurologic/Psychiatric:  other (RUE and RLE flaccid, facial droop right sided; 

LLE and LUE chronically weak 4/5)


Skin:  No rash on exposed areas, No ulcerations on exposed areas





Results/Procedures:


Labs


Laboratory Tests


22 12:27: Glucometer 108


22 18:32: Glucometer 121H


7/15/22 05:25: 


White Blood Count 9.7, Red Blood Count 4.53, Hemoglobin 12.4L, Hematocrit 39L, 

Mean Corpuscular Volume 86, Mean Corpuscular Hemoglobin 27, Mean Corpuscular 

Hemoglobin Concent 32, Red Cell Distribution Width 13.4, Platelet Count 351, 

Mean Platelet Volume 9.8, Immature Granulocyte % (Auto) 1, Neutrophils (%) 

(Auto) 68, Lymphocytes (%) (Auto) 17, Monocytes (%) (Auto) 11, Eosinophils (%) 

(Auto) 3, Basophils (%) (Auto) 1, Neutrophils # (Auto) 6.6, Lymphocytes # (Auto)

1.7, Monocytes # (Auto) 1.0, Eosinophils # (Auto) 0.3, Basophils # (Auto) 0.1, 

Immature Granulocyte # (Auto) 0.1, Sodium Level 142, Potassium Level 3.6, Chl

oride Level 107, Carbon Dioxide Level 21, Anion Gap 14, Blood Urea Nitrogen 11, 

Creatinine 0.62, Estimat Glomerular Filtration Rate 115, BUN/Creatinine Ratio 

18, Glucose Level 90, Calcium Level 8.9, Corrected Calcium 9.4, Total Bilirubin 

1.2H, Aspartate Amino Transf (AST/SGOT) 11, Alanine Aminotransferase (ALT/SGPT) 

9, Alkaline Phosphatase 66, Total Protein 6.7, Albumin 3.4





Microbiology


22 MRSA Screen - Final, Complete


          MRSA not isolated





Procedures


NAME:   MURRAY PICKETT


MED REC#:   [id number]


ACCOUNT#:   [id number]


PT STATUS:   ADM IN


:   1970


PHYSICIAN:   MARCO A HERNADEZ DO


ADMIT DATE:   07/10/22/[facility]


***Draft***


Date of Exam:22





CHEST 1 VIEW, AP/PA ONLY








Indication:  Hypoxia.





Time of Exam: 9:35 AM





Correlation is made with prior chest from 2022.





Heart is enlarged. There is a cardiac loop recorder overlying the


left chest. There is central vascularity prominence but no overt


failure. No effusion or pneumothorax is seen.





Impression: Cardiomegaly and central congestion.





  Dictated on workstation # [id number]








Dict:   [id number]


Trans:   [id number]


CV [id number]





Interpreted by:     MASOOD HUI MD


Electronically signed by:





A/P:


Assessment:


CVA with right sided hemiparesis, aphasia, dysphagia


- reported h/o several CVA/TIA's following COVID infection in  with left 

sided weakness - presents this time with righ sided 

hemiparesis/aphasia/dysphagia


- The stroke team at  was consulted with no further recommendations other than

continuing aspirin and Plavix and obtaining an MRI


- Management per stroke team


- Follows with Dr. Ramirez of neurology services at [facility]


- Carotid u/:  Very limited exam. The right carotid artery shows no hemo

dynamic disease with minimal plaquing.  Left carotid system and vertebral 

arteries were not adequately evaluated due to patient's pain and position. 

Previous CT angiography of the neck on 2022 suggested


the carotid and vertebral arteries to be widely patent without significant 

stenosis


- ILR implanted on 22 to eval for occult A Fib as the source of his CVA





?Angioedema


- Lisinopril stopped





Fall at home





Sleep apnea


- non-complaint with sleep study





? aspiration


- NPO





HTN





Crohn's dz


- h/o colectomy





DM 2


Plan:





CVA


- management per stroke team - please see recs per [facility] as above


- source undetermined - implanted ILR 22 to r/o possible a-fib/flutter as 

cause 


BP not well controlled


Remains NPO d/t concerns of aspiration - Continue IV Lopressor - start Catapress

patch


- advise NG or G-tube placement of administration of nourishment and medications


- management per medical services


Monitor lab closely


Replace electrolytes











ROXANNE BRADY           2022 10:09

## 2022-07-13 NOTE — CONSULTATION REPORT
DATE OF SERVICE:  07/13/2022



ATTENDING PRIMARY CLINICIAN:

Formerly [facility].



ADMITTING PHYSICIAN:

Dr. Miguel Castro.



HISTORY OF PRESENT ILLNESS:

The patient is a 51-year-old male, who initially had significant issues with

COVID infection approximately January 2021.  He had TIA versus stroke-like

symptoms and has had chronic debility since that time.  At home, he had a fall

in the bathroom with increased weakness on the left side.  They are unsure of

the loss of consciousness at that time.  The patient also was found to have

slurred speech as well.  The patient did have a CT scan performed, which did

show some lacunar infarcts of the left basal ganglia.  This was nonspecific and

an MRI was recommended; however, the patient's body habitus prohibited this. 

Further recommendations by tertiary center recommended aspirin and Plavix.  The

patient has had issues with dysphagia and speech pathology has been consulted

and the patient does have continued difficulty with swallowing, dysarthria as

well as inability to swallow secretions.



PAST MEDICAL HISTORY:

Long COVID-19 and diabetes.



PAST SURGICAL HISTORY:

Appendectomy, laparoscopic cholecystectomy, and some form of gastrointestinal

surgery.



ALLERGIES:

No known drug allergies.



MEDICATIONS:

Aspirin 81 mg daily, atorvastatin 40 mg daily, Plavix 75 mg daily, dapagliflozin

10 mg daily, famotidine 20 mg b.i.d., detemir insulin 18 units b.i.d.,

liraglutide 1.8 mg each day at bedtime, and lisinopril 20 mg daily.



SOCIAL HISTORY:

No known history of smoking or alcohol.



FAMILY HISTORY:

Noncontributory.



REVIEW OF SYSTEMS:

A well-nourished male, currently awake and alert and does respond to voice;

however, does have some issues with dysarthria.  He does not report any cough or

shortness of breath.  Difficulties swallowing as well as holding in secretions. 

No nausea, vomiting.  No known diarrhea, no constipation, no red blood per

rectum, no dark tarry stools.  No fever, chills, no recent inadvertent weight

loss.  All other review of systems negative.



PHYSICAL EXAMINATION:

VITAL SIGNS:  Temperature 37.1, blood pressure 153/85, pulse 92, respirations

20, and pulse ox 95% on 40% of Vapotherm.

CHEST:  Scattered rhonchi bilaterally.

HEART:  Regular, no murmurs.

EXTREMITIES:  +1/3 bilateral lower extremity edema, negative Homans sign.

HEENT:  No scleral icterus.

NECK:  No cervical lymphadenopathy.

ABDOMEN:  Soft, nontender, and nondistended.

SKIN:  Warm, dry.



LABORATORY DATA:

WBC 8.8, hemoglobin 13.1, hematocrit 40, and platelets 312.  BUN 10, creatinine

0.61.



ASSESSMENT AND PLAN:

A 51-year-old male with likely left-sided cerebrovascular accident with

dysarthria, dysphagia, and difficulty swallowing secretions.  Due to his

symptomatology, in discussion with the patient and family, they would like to

proceed with a percutaneous endoscopic gastrostomy tube placement, which we will

proceed with scheduling.



Patient was seen by anesthesia and due to high O2 demand feel at this time

wound likely need ET intubation. This was explained to the patient and 

family and they would like to hold off on the procedure for now. 





Job ID: [id number]

DocumentID: [id number]

Dictated Date:  07/13/2022 10:43:33

Transcription Date: 07/13/2022 11:04:23

Dictated By: CHAVEZ BUCIO MD

MTDD

## 2022-07-13 NOTE — PROGRESS NOTE-PRE OPERATIVE
Pre-Operative Progress Note


H&P Reviewed


The H&P was reviewed, patient examined and no changes noted.


Date Seen by Provider:  Jul 13, 2022


Time Seen by Provider:  10:45


Date H&P Reviewed:  Jul 13, 2022


Time H&P Reviewed:  10:45


Pre-Operative Diagnosis:  dysphagia, stroke











CHAVEZ BUCIO MD                Jul 13, 2022 10:44

## 2022-07-13 NOTE — OCCUPATIONAL THER DAILY NOTE
OT Current Status-Daily Note


Subjective


Pt laying in bed with wife present at bedside upon OT arrival, agreeable to tx. 

Pt was nonverbal throughout session, but was able to communication with hand 

gestures and head nods.





Mental Status/Objective


Patient Orientation:  Person, Non-Verbal/Aphasic, Situation


Attachments:  Drains, Nuñez Catheter, IV, Oxygen, Telemetry





ADL-Treatment


Therapy Code Descriptions/Definitions 





Functional Kendall Measure:


0=Not Assessed/NA        4=Minimal Assistance


1=Total Assistance        5=Supervision or Setup


2=Maximal Assistance  6=Modified Kendall


3=Moderate Assistance 7=Complete IndependenceSCALE: Activities may be completed 

with or without assistive devices.





6-Indepedent-patient completes the activity by him/herself with no assistance 

from a helper.


5-Set-up or Clean-up Assistance-helper sets up or cleans up; patient completes 

activity. Deforest assists only prior to or  


    following the activity.


4-Supervision or Touching Assistance-helper provides verbal cues and/or 

touching/steadying and/or contact guard assistance as patient completes ac

tivity. Assistance may be provided   


    throughout the activity or intermittently.


3-Partial/Moderate Assistance-helper does LESS THAN HALF the effort. Deforest 

lifts, holds or supports trunk or limbs, but provides less than half the effort.


2-Substantial/Maximal Assistance-helper does MORE THAN HALF the effort. Deforest 

lifts or holds trunk or limbs and provides more than half the effort.


8-Bdrfnuqfn-phhspg does ALL the effort. Patient does none of the effort to 

complete the activity. Or, the assistance of 2 or more helpers is required for 

the patient to complete the  


    activity.


If activity was not attempted, code reason:


7-Patient Refused.


9-Not Applicable-not attempted and the patient did not perform the activity 

before the current illness, exacerbation or injury.


10-Not Attempted due to Environmental Limitations-(lack of equipment, weather 

restraints, etc.).


88-Not Attempted due to Medical Conditions or Safety Concerns.





Other Treatment


Pt remained in bed throughout duration of tx. He declined any ADLs at this time,

but he was willing to participate in BUE neuromuscular training exercises to 

increase AROM in RUE. He completed 10 reps of the following BUE exercises: fist 

squeezes, bicep curls, wrist flexion/extension, and wrist ulnar/radial 

deviation. He completed the exercises with LUE AROM and RUE PROM. His RUE is 

flaccid with no muscle contractions noted, however, pt reports that he still has

sensation within his RUE. Pt and wife educated on the importance of continued 

AAROM and PROM in RUE to increase neuromuscular communication and to prevent 

swelling and possible contractures, wife verbalized agreement and pt attempted 

to return demonstration by clasping hands together and doing wrist flex/exten. 

Wife educated on positioning in order to provide PROM to pt. Post tx, pt left in

bed with call light in reach and all needs met.





Education


OT Patient Education:  Correct positioning, Energy conservation, Exercise 

program, Instructions to caregiver, Modified ADL techniques, Progress toward 

Goal/Update tx plan, Purpose of tx/functional activities, Rehab process


Teaching Recipient:  Patient, Significant Other


Teaching Methods:  Demonstration, Discussion


Response to Teaching:  Verbalize Understanding, Return Demonstration





OT Long Term Goals


Long Term Goals


Time Frame:  Aug 12, 2022


Eating (QC):  3


Toileting Hygiene (QC):  3


Shower/Bathe Self (QC):  2


Upper Body Dressing (QC):  3


Lower Body Dressing (QC):  2


On/Off Footwear (QC):  2


Additional Goals:  1-Demonstrate ADL Tasks, 2-Verbalize Understanding, 3-

ImproveStrength/Promise


1=Demonstrate adherence to instructed precautions during ADL tasks.


2=Patient will verbalize/demonstrate understanding of assistive 

devices/modifications for ADL.


3=Patient will improve strength/tolerance for activity to enable patient to 

perform ADL's.





OT Education/Plan


Problem List/Assessment


Assessment:  Decreased Activ Tolerance, Decreased UE Strength, Dependent 

Transfers, Impaired Bed Mobility, Impaired Coordination, Impaired Funct Balance,

Impaired I ADL's, Impaired Self-Care Skills, Restricted Funct UE ROM


Pt would benefit from skilled OT services in order to increase functional use of

RUE, improve independence and safety with ADLs and functional mobility, and for 

neuromuscular reeducation in order to maximize LOF for safe return home with 

spouse.





Discharge Recommendations


Plan/Recommendations:  Continue POC





Treatment Plan/Plan of Care


Patient would benefit from OT for education, treatment and training to promote 

independence in ADL's, mobility, safety and/or upper extremity function for 

ADL's.


Plan of Care:  ADL Retraining, Functional Mobility, UE Funct Exercise/Act, UE N

euromus Re-Ed/Coord


Treatment Duration:  Aug 12, 2022


Frequency:  5 times per week


Estimated Hrs Per Day:  .25 hour per day


Rehab Potential:  Poor





Time/GCodes


Start Time:  13:21


Stop Time:  13:39


Total Time Billed (hr/min):  18


Billed Treatment Time


1, AVERY LILLY OT          Jul 13, 2022 14:11

## 2022-07-13 NOTE — DIAGNOSTIC IMAGING REPORT
Indication:  Hypoxia.



Time of Exam: 9:35 AM



Correlation is made with prior chest from 07/11/2022.



Heart is enlarged. There is a cardiac loop recorder overlying the

left chest. There is central vascularity prominence but no overt

failure. No effusion or pneumothorax is seen.



Impression: Cardiomegaly and central congestion.



Dictated by: 



  Dictated on workstation # [id number]

## 2022-07-13 NOTE — PHYSICAL THERAPY DAILY NOTE
PT Daily Note-Current


Subjective


Patient in bed pre tx, agrees to PT, has no complaints of pain.  Patient has had

a BM, will assist nursing with rolling to clean patient.





Appearance


Patient in bed post tx with nurse call, phone, tray, laying on right side for 

pressure relief.





Mental Status


Patient Orientation:  Person, Unable to Assess, Non-Verbal/Aphasic


Attachments:  Oxygen (bipap), Nuñez Catheter, IV





Transfers


SCALE: Activities may be completed with or without assistive devices.





6-Indepedent-patient completes the activity by him/herself with no assistance 

from a helper.


5-Set-up or Clean-up Assistance-helper sets up or cleans up; patient completes 

activity. Gratis assists only prior to or  


    following the activity.


4-Supervision or Touching Assistance-helper provides verbal cues and/or 

touching/steadying and/or contact guard assistance as patient completes activi

ty. Assistance may be provided   


    throughout the activity or intermittently.


3-Partial/Moderate Assistance-helper does LESS THAN HALF the effort. Gratis 

lifts, holds or supports trunk or limbs, but provides less than half the effort.


2-Substantial/Maximal Assistance-helper does MORE THAN HALF the effort. Gratis 

lifts or holds trunk or limbs and provides more than half the effort.


8-Crluxyzks-nrztda does ALL the effort. Patient does none of the effort to 

complete the activity. Or, the assistance of 2 or more helpers is required for 

the patient to complete the  


    activity.


If activity was not attempted, code reason:


7-Patient Refused.


9-Not Applicable-not attempted and the patient did not perform the activity 

before the current illness, exacerbation or injury.


10-Not Attempted due to Environmental Limitations-(lack of equipment, weather 

restraints, etc.).


88-Not Attempted due to Medical Conditions or Safety Concerns.


Roll Left & Right (QC):  1


Sit to Lying (QC):  1


Lying to Sitting/Side of Bed(Q:  1


Patient rolls from side to side with assist of 3 for cleaning and changing pads.

 When done patient sits to the side of the bed with assist of 3, he is able to 

sit for several minutes and perform exercises with LLE, he does not have any 

voluntary movement of the RLE at this time.  Patient lays back down after 

getting fatigued (assist of 3) and is scooted up in bed.





Exercises


Seated Therapy Exercises:  Ankle pumps, Long arc quads, Hip flexion


Seated Reps:  20 (LLE)





Treatments


rolling, cleaning BM, sitting, LE ROM





Assessment


Current Status:  Poor Progress


dependent for mobility





PT Long Term Goals


Long Term Goals


PT Long Term Goals Time Frame:  Jul 18, 2022


Roll Left & Right (QC):  3


Sit to Lying (QC):  3


Lying-Sitting on Side/Bed(QC):  3


Sit to Stand (QC):  3


Chair/Bed-to-Chair Xfer(QC):  2





PT Plan


Problem List


Problem List:  Activity Tolerance, Functional Strength, Safety, Balance, Gait, 

Transfer, Bed Mobility, ROM





Treatment/Plan


Treatment Plan:  Continue Plan of Care


Treatment Plan:  Bed Mobility, Education, Functional Activity Promise, Functional 

Strength, Gait, Safety, Therapeutic Exercise, Transfers


Treatment Duration:  Jul 18, 2022


Frequency:  6 times per week


Estimated Hrs Per Day:  .25 hour per day


Patient and/or Family Agrees t:  Yes





Safety Risks/Education


Patient Education:  Correct Positioning, Safety Issues


Teaching Recipient:  Patient


Teaching Methods:  Demonstration, Discussion


Response to Teaching:  Reinforcement Needed





Time/GCodes


Time In:  1440


Time Out:  1504


Total Billed Treatment Time:  24


Total Billed Treatment


1 visit


FA 24'











ALYSSIA MONTILLA PT                Jul 13, 2022 15:19

## 2022-07-13 NOTE — PROGRESS NOTE - HOSPITALIST
FRANCO PAZ MED STUDENT 7/13/22 1014:


Subjective


HPI/CC On Admission


Date Seen by Provider:  Jul 13, 2022


Time Seen by Provider:  08:00


Patient is a 50yo male, h/o COVID about 1.5y ago and multiple neurologic insults

during hospitalization for Covid at that time (TIA vs strokes).  He has had 

chronic debility since then with several ED visits for weakness and strokelike 

symptoms.  Apparently his sons were at home with him and he had a fall in the 

bathroom with increased weakness to the left side.  Unsure of LOC as sons are 

not here at this time for history.  The patient (with significantly slurred 

speech) is able to tell me that his wife is working in [place] - which is 

consistent with prior visits to our ER.  He does state he has a mild headache.  

Otherwise his speech is so slurred he is very difficult to understand.


Upon my arrival patient exhibited right neglect tongue was sticking out mildly 

swollen without erythema.  The patient is eyes were open and did orient to voice

he was slow to respond and very dysarthric unintelligible speech.  He was able 

to follow simple commands but when asked to use his right hand to touch my 

finger he only moved his left hand and was able to do so with good control of 

the left hand.  His wife came later in the day and reports that he had been 

seeing neurologist Dr. Loja had a rather extensive work-up was started on 

aspirin and Plavix but no apparent large vessel disease was noted.  He had an 

echocardiogram gram and there is been no evidence for atrial fibrillation.  He 

was having no neurologic issues previous to COVID infection in January 2021.  He

had been doing well at home until the night of his admission when his right leg 

apparently gave out he stumbled forward they helped him to the bed and again 

noted that he was dysarthric and his tongue appeared to be swollen.  There were 

no rash issues reported he was confused but there was no loss of consciousness 

and no reported pallor.  He had been feeling well up until that point.


Subjective/Events-last exam


Pt lying in bed comfortably this morning with no concerns. Wife is at bed side 

and has no concerns. Pt had loop recorder placed yesterday with no 

complications. IRF eval is pending insurance approval.





Objective


Exam


Vital Signs





Vital Signs








  Date Time  Temp Pulse Resp B/P (MAP) Pulse Ox O2 Delivery O2 Flow Rate FiO2


 


7/13/22 08:00 37.1 92 20 153/85 (107) 95 Vapotherm 40.00 





       95.00 


 


7/13/22 04:50        100





Capillary Refill : Less Than 3 Seconds


General Appearance:  No Apparent Distress, WD/WN


HEENT:  PERRL/EOMI, Pharynx Normal


Neck:  Full Range of Motion, Normal Inspection


Respiratory:  Rales (at apex bilaterally)


Cardiovascular:  No Murmur


Gastrointestinal:  Normal Bowel Sounds, No Organomegaly


Extremity:  Normal Capillary Refill, Normal Inspection


Neurologic/Psychiatric:  Alert, Oriented x3, Motor Weakness (4+ strength on LLE 

and LUE, 0 strength on RUE and RLE)


Skin:  Normal Color, Warm/Dry


Lymphatic:  No Adenopathy





Results/Procedures


Lab


Laboratory Tests


7/13/22 06:08








Patient resulted labs reviewed.





Assessment/Plan


Assessment and Plan


Assess & Plan/Chief Complaint


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


Acute respiratory distress requiring supplemental oxygen


Angioedema of tongue


HTN





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


   -Carotid ultrasound unremarkable


   -Cardiology consulted, appreciate their recommendations


   -CT head showed atrophy and chronic ischemic microvascular changes with lunar

infarcts in Left basal ganglia


   -CTA showed no large vessel occlusion


   -MRI recommended, but couldn't be done d/t body habitus


   -Speech therapy, Physical therapy and Occupational therapy


   -Inpatient Rehab Facility eval pending insurance approval


   -NPO


   -Consulted general surgery for PEG tube placement, appreciate their 

assistance


Acute respiratory distress requiring supplemental oxygen


   -Vapotherm 40L and 95% O2


   -Will order CXR and ABG


Angioedema of tongue


   -Hold lisinopril


   -Speech therapy advised NPO


HTN


   -Permissive HTN following stroke


   -Improved


Diabetes


   -SSI





Diet-NPO, PEG tube to be placed today


DVT prophylaxis- SCDs





GILDA HERNADEZ DO 7/14/22 0545:


Subjective


Subjective/Events-last exam


Pt is doing about the same


Aspiration risk so PEG tube will be placed by Dr. Olmedo today


Loop recorder already placed


Will need BiPAP due to ABG showing CO2 retention and hypoxia


Review of Systems


General:  Fatigue, Malaise


Pulmonary:  Dyspnea





Objective


Exam


General Appearance:  No Apparent Distress, WD/WN, Chronically ill, Obese


Respiratory:  No Accessory Muscle Use, No Respiratory Distress, Decreased Breath

Sounds, Rales (at apex bilaterally), Wheezing


Cardiovascular:  Regular Rate, Rhythm


Neurologic/Psychiatric:  Alert, Oriented x3, Motor Weakness (4+ strength on LLE 

and LUE, 0 strength on RUE and RLE)





Assessment/Plan


Assessment and Plan


Assess & Plan/Chief Complaint


ABG


BiPAP


PEG





Supervisory-Addendum Brief


Verification & Attestation


Participated in pt care:  history, MDM, physical


Personally performed:  exam, history, MDM, supervision of care


Care discussed with:  Medical Student


Procedures:  n/a


Results interpretation:  Verified all documentation


Verification and Attestation of Medical Student E/M Service





A medical student performed and documented this service in my presence. I 

reviewed and verified all information documented by the medical student and made

modifications to such information, when appropriate. I personally performed the 

physical exam and medical decision making. 





 Gilda Hernadez, Jul 14, 2022,05:44











FRANCO PAZ MED STUDENT    Jul 13, 2022 10:14


GILDA HERNADEZ DO                Jul 14, 2022 05:45

## 2022-07-14 VITALS — SYSTOLIC BLOOD PRESSURE: 163 MMHG | DIASTOLIC BLOOD PRESSURE: 96 MMHG

## 2022-07-14 VITALS — SYSTOLIC BLOOD PRESSURE: 157 MMHG | DIASTOLIC BLOOD PRESSURE: 89 MMHG

## 2022-07-14 VITALS — SYSTOLIC BLOOD PRESSURE: 179 MMHG | DIASTOLIC BLOOD PRESSURE: 100 MMHG

## 2022-07-14 VITALS — SYSTOLIC BLOOD PRESSURE: 149 MMHG | DIASTOLIC BLOOD PRESSURE: 95 MMHG

## 2022-07-14 VITALS — DIASTOLIC BLOOD PRESSURE: 85 MMHG | SYSTOLIC BLOOD PRESSURE: 164 MMHG

## 2022-07-14 VITALS — DIASTOLIC BLOOD PRESSURE: 89 MMHG | SYSTOLIC BLOOD PRESSURE: 156 MMHG

## 2022-07-14 VITALS — SYSTOLIC BLOOD PRESSURE: 184 MMHG | DIASTOLIC BLOOD PRESSURE: 93 MMHG

## 2022-07-14 VITALS — DIASTOLIC BLOOD PRESSURE: 96 MMHG | SYSTOLIC BLOOD PRESSURE: 187 MMHG

## 2022-07-14 VITALS — SYSTOLIC BLOOD PRESSURE: 149 MMHG | DIASTOLIC BLOOD PRESSURE: 79 MMHG

## 2022-07-14 VITALS — DIASTOLIC BLOOD PRESSURE: 95 MMHG | SYSTOLIC BLOOD PRESSURE: 175 MMHG

## 2022-07-14 VITALS — SYSTOLIC BLOOD PRESSURE: 182 MMHG | DIASTOLIC BLOOD PRESSURE: 91 MMHG

## 2022-07-14 VITALS — SYSTOLIC BLOOD PRESSURE: 168 MMHG | DIASTOLIC BLOOD PRESSURE: 91 MMHG

## 2022-07-14 VITALS — SYSTOLIC BLOOD PRESSURE: 181 MMHG | DIASTOLIC BLOOD PRESSURE: 97 MMHG

## 2022-07-14 VITALS — SYSTOLIC BLOOD PRESSURE: 147 MMHG | DIASTOLIC BLOOD PRESSURE: 71 MMHG

## 2022-07-14 VITALS — DIASTOLIC BLOOD PRESSURE: 95 MMHG | SYSTOLIC BLOOD PRESSURE: 194 MMHG

## 2022-07-14 VITALS — SYSTOLIC BLOOD PRESSURE: 148 MMHG | DIASTOLIC BLOOD PRESSURE: 80 MMHG

## 2022-07-14 VITALS — DIASTOLIC BLOOD PRESSURE: 92 MMHG | SYSTOLIC BLOOD PRESSURE: 173 MMHG

## 2022-07-14 VITALS — SYSTOLIC BLOOD PRESSURE: 174 MMHG | DIASTOLIC BLOOD PRESSURE: 91 MMHG

## 2022-07-14 VITALS — DIASTOLIC BLOOD PRESSURE: 89 MMHG | SYSTOLIC BLOOD PRESSURE: 158 MMHG

## 2022-07-14 LAB
ALBUMIN SERPL-MCNC: 3.4 GM/DL (ref 3.2–4.5)
ALP SERPL-CCNC: 72 U/L (ref 40–136)
ALT SERPL-CCNC: 8 U/L (ref 0–55)
ARTERIAL PATENCY WRIST A: (no result)
ARTERIAL PATENCY WRIST A: (no result)
BASE EXCESS STD BLDA CALC-SCNC: -0.7 MMOL/L (ref -2.5–2.5)
BASE EXCESS STD BLDA CALC-SCNC: 0.7 MMOL/L (ref -2.5–2.5)
BASOPHILS # BLD AUTO: 0.1 10^3/UL (ref 0–0.1)
BASOPHILS NFR BLD AUTO: 1 % (ref 0–10)
BDY SITE: (no result)
BDY SITE: (no result)
BILIRUB SERPL-MCNC: 1.5 MG/DL (ref 0.1–1)
BODY TEMPERATURE: 36.6
BODY TEMPERATURE: 36.6
BUN/CREAT SERPL: 21
CALCIUM SERPL-MCNC: 8.6 MG/DL (ref 8.5–10.1)
CHLORIDE SERPL-SCNC: 106 MMOL/L (ref 98–107)
CO2 BLDA CALC-SCNC: 23.9 MMOL/L (ref 21–31)
CO2 BLDA CALC-SCNC: 27 MMOL/L (ref 21–31)
CO2 SERPL-SCNC: 22 MMOL/L (ref 21–32)
CREAT SERPL-MCNC: 0.57 MG/DL (ref 0.6–1.3)
EOSINOPHIL # BLD AUTO: 0.3 10^3/UL (ref 0–0.3)
EOSINOPHIL NFR BLD AUTO: 4 % (ref 0–10)
GFR SERPLBLD BASED ON 1.73 SQ M-ARVRAT: 118 ML/MIN
GLUCOSE SERPL-MCNC: 108 MG/DL (ref 70–105)
HCT VFR BLD CALC: 42 % (ref 40–54)
HGB BLD-MCNC: 13 G/DL (ref 13.3–17.7)
INHALED O2 FLOW RATE: (no result) L/MIN
INHALED O2 FLOW RATE: (no result) L/MIN
LYMPHOCYTES # BLD AUTO: 1.4 10^3/UL (ref 1–4)
LYMPHOCYTES NFR BLD AUTO: 17 % (ref 12–44)
MAGNESIUM SERPL-MCNC: 1.9 MG/DL (ref 1.6–2.4)
MANUAL DIFFERENTIAL PERFORMED BLD QL: NO
MCH RBC QN AUTO: 28 PG (ref 25–34)
MCHC RBC AUTO-ENTMCNC: 31 G/DL (ref 32–36)
MCV RBC AUTO: 90 FL (ref 80–99)
MONOCYTES # BLD AUTO: 0.9 10^3/UL (ref 0–1)
MONOCYTES NFR BLD AUTO: 11 % (ref 0–12)
NEUTROPHILS # BLD AUTO: 5.5 10^3/UL (ref 1.8–7.8)
NEUTROPHILS NFR BLD AUTO: 68 % (ref 42–75)
PCO2 BLDA: 33 MMHG (ref 35–45)
PCO2 BLDA: 46 MMHG (ref 35–45)
PH BLDA: 7.36 [PH] (ref 7.37–7.43)
PH BLDA: 7.45 [PH] (ref 7.37–7.43)
PLATELET # BLD: 269 10^3/UL (ref 130–400)
PMV BLD AUTO: 10.4 FL (ref 9–12.2)
PO2 BLDA: 165 MMHG (ref 79–93)
PO2 BLDA: 98 MMHG (ref 79–93)
POTASSIUM SERPL-SCNC: 3.6 MMOL/L (ref 3.6–5)
PROT SERPL-MCNC: 6.6 GM/DL (ref 6.4–8.2)
SAO2 % BLDA FROM PO2: 98 % (ref 94–100)
SAO2 % BLDA FROM PO2: 99 % (ref 94–100)
SODIUM SERPL-SCNC: 140 MMOL/L (ref 135–145)
VENTILATION MODE VENT: NO
VENTILATION MODE VENT: NO
WBC # BLD AUTO: 8.2 10^3/UL (ref 4.3–11)

## 2022-07-14 PROCEDURE — 5A09357 ASSISTANCE WITH RESPIRATORY VENTILATION, LESS THAN 24 CONSECUTIVE HOURS, CONTINUOUS POSITIVE AIRWAY PRESSURE: ICD-10-PCS | Performed by: INTERNAL MEDICINE

## 2022-07-14 PROCEDURE — 0DH63UZ INSERTION OF FEEDING DEVICE INTO STOMACH, PERCUTANEOUS APPROACH: ICD-10-PCS | Performed by: SURGERY

## 2022-07-14 RX ADMIN — INSULIN ASPART SCH UNIT: 100 INJECTION, SOLUTION INTRAVENOUS; SUBCUTANEOUS at 18:39

## 2022-07-14 RX ADMIN — SODIUM CHLORIDE SCH MLS/HR: 900 INJECTION, SOLUTION INTRAVENOUS at 11:06

## 2022-07-14 RX ADMIN — METOPROLOL TARTRATE SCH MG: 1 INJECTION, SOLUTION INTRAVENOUS at 05:37

## 2022-07-14 RX ADMIN — EMPAGLIFLOZIN SCH MG: 10 TABLET, FILM COATED ORAL at 20:46

## 2022-07-14 RX ADMIN — ENOXAPARIN SODIUM SCH MG: 100 INJECTION SUBCUTANEOUS at 00:13

## 2022-07-14 RX ADMIN — METOPROLOL TARTRATE SCH MG: 1 INJECTION, SOLUTION INTRAVENOUS at 20:46

## 2022-07-14 RX ADMIN — FAMOTIDINE SCH MG: 20 TABLET, FILM COATED ORAL at 20:46

## 2022-07-14 RX ADMIN — ASPIRIN SCH MG: 81 TABLET ORAL at 11:07

## 2022-07-14 RX ADMIN — METOPROLOL TARTRATE SCH MG: 1 INJECTION, SOLUTION INTRAVENOUS at 00:13

## 2022-07-14 RX ADMIN — METOPROLOL TARTRATE SCH MG: 1 INJECTION, SOLUTION INTRAVENOUS at 12:38

## 2022-07-14 RX ADMIN — CLOPIDOGREL BISULFATE SCH MG: 75 TABLET, FILM COATED ORAL at 11:06

## 2022-07-14 RX ADMIN — INSULIN ASPART SCH UNIT: 100 INJECTION, SOLUTION INTRAVENOUS; SUBCUTANEOUS at 22:02

## 2022-07-14 RX ADMIN — METOPROLOL TARTRATE SCH MG: 1 INJECTION, SOLUTION INTRAVENOUS at 18:40

## 2022-07-14 RX ADMIN — INSULIN ASPART SCH UNIT: 100 INJECTION, SOLUTION INTRAVENOUS; SUBCUTANEOUS at 12:34

## 2022-07-14 RX ADMIN — ENOXAPARIN SODIUM SCH MG: 100 INJECTION SUBCUTANEOUS at 23:27

## 2022-07-14 RX ADMIN — INSULIN ASPART SCH UNIT: 100 INJECTION, SOLUTION INTRAVENOUS; SUBCUTANEOUS at 06:14

## 2022-07-14 RX ADMIN — ENOXAPARIN SODIUM SCH MG: 100 INJECTION SUBCUTANEOUS at 18:23

## 2022-07-14 RX ADMIN — FAMOTIDINE SCH MG: 20 TABLET, FILM COATED ORAL at 11:07

## 2022-07-14 RX ADMIN — METOPROLOL TARTRATE SCH MG: 1 INJECTION, SOLUTION INTRAVENOUS at 23:27

## 2022-07-14 NOTE — OCCUPATIONAL THER DAILY NOTE
OT Current Status-Daily Note


Subjective


Pt laying in bed with family present upon OT arrival, agreeable to tx. Pt used 

hand gestures to communicate with clinician.





Mental Status/Objective


Patient Orientation:  Person, Non-Verbal/Aphasic, Situation


Attachments:  Drains, Nuñez Catheter, IV, Oxygen, Telemetry





ADL-Treatment


Therapy Code Descriptions/Definitions 





Functional [place] Measure:


0=Not Assessed/NA        4=Minimal Assistance


1=Total Assistance        5=Supervision or Setup


2=Maximal Assistance  6=Modified [place]


3=Moderate Assistance 7=Complete IndependenceSCALE: Activities may be completed 

with or without assistive devices.





6-Indepedent-patient completes the activity by him/herself with no assistance 

from a helper.


5-Set-up or Clean-up Assistance-helper sets up or cleans up; patient completes 

activity. [place] assists only prior to or  


    following the activity.


4-Supervision or Touching Assistance-helper provides verbal cues and/or luis eduardo

marci/steadying and/or contact guard assistance as patient completes activity. 

Assistance may be provided   


    throughout the activity or intermittently.


3-Partial/Moderate Assistance-helper does LESS THAN HALF the effort. [place] 

lifts, holds or supports trunk or limbs, but provides less than half the effort.


2-Substantial/Maximal Assistance-helper does MORE THAN HALF the effort. [place] 

lifts or holds trunk or limbs and provides more than half the effort.


1-Vkhfjaquy-kdycme does ALL the effort. Patient does none of the effort to 

complete the activity. Or, the assistance of 2 or more helpers is required for 

the patient to complete the  


    activity.


If activity was not attempted, code reason:


7-Patient Refused.


9-Not Applicable-not attempted and the patient did not perform the activity 

before the current illness, exacerbation or injury.


10-Not Attempted due to Environmental Limitations-(lack of equipment, weather 

restraints, etc.).


88-Not Attempted due to Medical Conditions or Safety Concerns.





Other Treatment


Pt required mod-max assist x3 to transfer from supine to seated EOB. Pt required

min-mod assist x2-3 to maintain seated balance EOB, and clinicians provided 

verbal and tactile cues for pt to use BUE for seated balance and to maintain 

head at midline. Pt required total assist to wipe chest, back, and arms while 

seated EOB. Pt attempted to stand, but was only able to stand ~50% of the way x2

dependently, repeated twice. Clinician wiped bottom while pt stood. Mod-max 

assist x3 to return pt to supine and positioned on L side for pressure relief. 

Post tx, pt left in bed with call light in reach and all needs met.





Education


OT Patient Education:  Correct positioning, Energy conservation, Instructions to

caregiver, Modified ADL techniques, Progress toward Goal/Update tx plan, Purpose

of tx/functional activities, Rehab process, Safety issues


Teaching Recipient:  Patient, Family, Significant Other


Teaching Methods:  Discussion


Response to Teaching:  Verbalize Understanding





OT Long Term Goals


Long Term Goals


Time Frame:  Aug 12, 2022


Eating (QC):  3


Toileting Hygiene (QC):  3


Shower/Bathe Self (QC):  2


Upper Body Dressing (QC):  3


Lower Body Dressing (QC):  2


On/Off Footwear (QC):  2


Additional Goals:  1-Demonstrate ADL Tasks, 2-Verbalize Understanding, 3-

ImproveStrength/Promise


1=Demonstrate adherence to instructed precautions during ADL tasks.


2=Patient will verbalize/demonstrate understanding of assistive 

devices/modifications for ADL.


3=Patient will improve strength/tolerance for activity to enable patient to 

perform ADL's.





OT Education/Plan


Problem List/Assessment


Assessment:  Decreased Activ Tolerance, Decreased UE Strength, Dependent 

Transfers, Impaired Bed Mobility, Impaired Coordination, Impaired Funct Balance,

Impaired I ADL's, Impaired Self-Care Skills, Restricted Funct UE ROM


Pt would benefit from skilled OT services in order to increase functional use of

RUE, improve independence and safety with ADLs and functional mobility, and for 

neuromuscular reeducation in order to maximize LOF for safe return home with 

spouse.





Discharge Recommendations


Plan/Recommendations:  Continue POC





Treatment Plan/Plan of Care


Patient would benefit from OT for education, treatment and training to promote 

independence in ADL's, mobility, safety and/or upper extremity function for 

ADL's.


Plan of Care:  ADL Retraining, Functional Mobility, UE Funct Exercise/Act, UE 

Neuromus Re-Ed/Coord


Treatment Duration:  Aug 12, 2022


Frequency:  5 times per week


Estimated Hrs Per Day:  .25 hour per day


Rehab Potential:  Poor





Time/GCodes


Start Time:  11:25


Stop Time:  11:40


Total Time Billed (hr/min):  15


Billed Treatment Time


1, AVERY LILLY OT          Jul 14, 2022 11:53

## 2022-07-14 NOTE — PROGRESS NOTE - CARDIOLOGY
Cardiology SOAP Progress Note


Subjective:


Unable to communicate verbally


Does not indicate cp or palp or shortness of breath





Objective:


I&O/Vital Signs











 7/14/22 7/14/22 7/14/22 7/14/22





 06:00 07:00 08:00 08:00


 


Temp   36.9 


 


Pulse 101 101  98


 


Resp 22   21


 


B/P (MAP)    173/92 (119)


 


Pulse Ox 95   94


 


O2 Delivery    NIV Bilevel


 


O2 Flow Rate 45.00   35.00


 


    





 7/14/22 7/14/22 7/14/22 7/14/22





 09:00 09:30 12:00 13:00


 


Pulse  95 93 88


 


Resp  21 24 


 


B/P (MAP)   164/85 (111) 


 


Pulse Ox  95 95 


 


O2 Delivery NIV Bilevel  NIV Bilevel 


 


O2 Flow Rate  45.00 35.00 


 


FiO2 50   





 7/14/22 7/14/22 7/14/22 7/14/22





 14:00 15:41 15:41 15:45


 


Temp  36.2  


 


Pulse 93   


 


Resp 20 20  


 


B/P (MAP)  163/96 (118)  


 


Pulse Ox 95 98  


 


O2 Delivery  Non Rebreather Non Rebreather Non Rebreather


 


O2 Flow Rate 40.00 15.00 15.00 15.00


 


    





 7/14/22 7/14/22 7/14/22 7/14/22





 15:50 16:00 16:00 16:10


 


Resp 20 20  


 


B/P (MAP) 168/91 (116) 158/89 (112)  


 


Pulse Ox 98 96  


 


O2 Delivery Non Rebreather Non Rebreather Non Rebreather Non Rebreather


 


O2 Flow Rate 15.00 15.00 15.00 15.00





 7/14/22 7/14/22 7/14/22 7/14/22





 16:10 16:15 16:30 16:37


 


Temp 36.2   


 


Pulse  92 94 


 


Resp 20 22 22 


 


B/P (MAP) 156/89 (111) 179/100 (126) 181/97 (125) 


 


Pulse Ox 96 92 92 


 


O2 Delivery Non Rebreather NIV Bilevel NIV Bilevel NIV Bilevel


 


O2 Flow Rate 15.00 45.00 45.00 


 


FiO2    50














 7/14/22





 00:00


 


Intake Total 150 ml


 


Output Total 1275 ml


 


Balance -1125 ml








Constitutional:  well-developed, other (non-verbal - nods head slowly in 

response to questions)


Respiratory:  No accessory muscle use, No respiratory distress; chest expansion 

is symmetric, chest is bilaterally symmetric, lungs clear to auscultation


Cardiovascular:  regular rate-rhythm; No JVD; tachycardia, S1 and S2


Gastrointestional:  No tender; soft, round, audible bowel sounds


Extremities:  no lower extremity edema bilateral


Neurologic/Psychiatric:  other (RUE and RLE flaccid, facial droop right sided; 

LLE and LUE chronically weak 4/5; aphasic)


Skin:  No rash on exposed areas, No ulcerations on exposed areas





Results/Procedures:


Labs


Laboratory Tests


7/13/22 21:06: Glucometer 111H


7/14/22 05:21: 


White Blood Count 8.2, Red Blood Count 4.64, Hemoglobin 13.0L, Hematocrit 42, 

Mean Corpuscular Volume 90, Mean Corpuscular Hemoglobin 28, Mean Corpuscular 

Hemoglobin Concent 31L, Red Cell Distribution Width 13.3, Platelet Count 269, 

Mean Platelet Volume 10.4, Immature Granulocyte % (Auto) 1, Neutrophils (%) 

(Auto) 68, Lymphocytes (%) (Auto) 17, Monocytes (%) (Auto) 11, Eosinophils (%) 

(Auto) 4, Basophils (%) (Auto) 1, Neutrophils # (Auto) 5.5, Lymphocytes # (Auto)

1.4, Monocytes # (Auto) 0.9, Eosinophils # (Auto) 0.3, Basophils # (Auto) 0.1, 

Immature Granulocyte # (Auto) 0.1, Sodium Level 140, Potassium Level 3.6, 

Chloride Level 106, Carbon Dioxide Level 22, Anion Gap 12, Blood Urea Nitrogen 

12, Creatinine 0.57L, Estimat Glomerular Filtration Rate 118, BUN/Creatinine 

Ratio 21, Glucose Level 108H, Calcium Level 8.6, Corrected Calcium 9.1, 

Magnesium Level 1.9, Total Bilirubin 1.5H, Aspartate Amino Transf (AST/SGOT) 11,

Alanine Aminotransferase (ALT/SGPT) 8, Alkaline Phosphatase 72, Total Protein 

6.6, Albumin 3.4


7/14/22 07:15: 


Blood Gas Puncture Site RIGHT RAD, Blood Gas Patient Temperature 36.6, Arterial 

Blood pH 7.45H, Arterial Blood Partial Pressure CO2 33L, Arterial Blood Partial 

Pressure O2 165H, Arterial Blood HCO3 23, Arterial Blood Total CO2 23.9, 

Arterial Blood Oxygen Saturation 99, Arterial Blood Base Excess -0.7, [place] Test

YES-POS, Blood Gas Ventilator Setting NO, Blood Gas Inspired Oxygen 45 % BIPAP


7/14/22 07:30: 


Blood Gas Puncture Site LEFT RAD, Blood Gas Patient Temperature 36.6, Arterial 

Blood pH 7.36L, Arterial Blood Partial Pressure CO2 46H, Arterial Blood Partial 

Pressure O2 98H, Arterial Blood HCO3 26, Arterial Blood Total CO2 27.0, Arterial

Blood Oxygen Saturation 98, Arterial Blood Base Excess 0.7, [place] Test YES-POS, 

Blood Gas Ventilator Setting NO, Blood Gas Inspired Oxygen 45%


7/14/22 12:27: Glucometer 108





Microbiology


7/12/22 MRSA Screen - Final, Complete


          MRSA not isolated





Laboratory Tests


7/13/22 06:08








7/14/22 05:21











A/P:


Assessment:


CVA with right sided hemiparesis, aphasia, dysphagia


- reported h/o several CVA/TIA's following COVID infection in 2021 with left 

sided weakness - presents this time with righ sided 

hemiparesis/aphasia/dysphagia


- The stroke team at  was consulted with no further recommendations other than

continuing aspirin and Plavix and obtaining an MRI


- Management per stroke team


- Follows with Dr. Ramirez of neurology services at [facility]


- Carotid u/s7-11-22:  Very limited exam. The right carotid artery shows no 

hemodynamic disease with minimal plaquing.  Left carotid system and vertebral 

arteries were not adequately evaluated due to patient's pain and position. Prev

ious CT angiography of the neck on 07/09/2022 suggested


the carotid and vertebral arteries to be widely patent without significant 

stenosis


- ILR implanted on 7/12/22 to eval for occult A Fib as the source of his CVA





?Angioedema


- Lisinopril stopped





Fall at home





Sleep apnea


- non-complaint with sleep study





? aspiration


- NPO





HTN





Crohn's dz


- h/o colectomy





DM 2


Plan:





CVA


- management per stroke team - please see recs per [facility] as above


- source undetermined - implanted ILR 7-12-22 to r/o possible a-fib/flutter as 

cause 


BP not well controlled


- increase iv metoprolol to 5 mg q 4 h


Remains NPO d/t concerns of aspiration - Continue IV Lopressor - start Catapress

patch


- advise NG or G-tube placement of administration of nourishment and medications


- management per medical services


Monitor lab closely


Replace electrolytes











VICKY ROBERTS MD FACP [facility]   Jul 14, 2022 17:56

## 2022-07-14 NOTE — PROGRESS NOTE - HOSPITALIST
FRANCO PAZ MED STUDENT 7/14/22 1159:


Subjective


HPI/CC On Admission


Date Seen by Provider:  Jul 14, 2022


Time Seen by Provider:  08:15


Patient is a 52yo male, h/o COVID about 1.5y ago and multiple neurologic insults

during hospitalization for Covid at that time (TIA vs strokes).  He has had 

chronic debility since then with several ED visits for weakness and strokelike 

symptoms.  Apparently his sons were at home with him and he had a fall in the 

bathroom with increased weakness to the left side.  Unsure of LOC as sons are 

not here at this time for history.  The patient (with significantly slurred 

speech) is able to tell me that his wife is working in [place] - which is 

consistent with prior visits to our ER.  He does state he has a mild headache.  

Otherwise his speech is so slurred he is very difficult to understand.


Upon my arrival patient exhibited right neglect tongue was sticking out mildly 

swollen without erythema.  The patient is eyes were open and did orient to voice

he was slow to respond and very dysarthric unintelligible speech.  He was able 

to follow simple commands but when asked to use his right hand to touch my 

finger he only moved his left hand and was able to do so with good control of 

the left hand.  His wife came later in the day and reports that he had been 

seeing neurologist Dr. Loja had a rather extensive work-up was started on 

aspirin and Plavix but no apparent large vessel disease was noted.  He had an 

echocardiogram gram and there is been no evidence for atrial fibrillation.  He 

was having no neurologic issues previous to COVID infection in January 2021.  He

had been doing well at home until the night of his admission when his right leg 

apparently gave out he stumbled forward they helped him to the bed and again 

noted that he was dysarthric and his tongue appeared to be swollen.  There were 

no rash issues reported he was confused but there was no loss of consciousness 

and no reported pallor.  He had been feeling well up until that point.


Subjective/Events-last exam


Pt lying in bed comfortably with family at bedside. Pt status is the same as 

yesterday. Pt was started on bipap yesterday evening. Pt and family have decided

to proceed with PEG tube placement. They are agreeable to short term intubation,

but do not want a trach. Pt and family have no concerns at this time.





Objective


Exam


Vital Signs





Vital Signs








  Date Time  Temp Pulse Resp B/P (MAP) Pulse Ox O2 Delivery O2 Flow Rate FiO2


 


7/14/22 09:00      NIV Bilevel  50


 


7/14/22 08:00  98 21 173/92 (119) 94  35.00 


 


7/14/22 08:00 36.9       





Capillary Refill : Less Than 3 Seconds


General Appearance:  No Apparent Distress, Obese


HEENT:  PERRL/EOMI, Moist Mucous Membranes


Neck:  Normal Inspection, Non Tender


Respiratory:  Chest Non Tender, Rales, Other (aeration improved from yesterday)


Cardiovascular:  Regular Rate, Rhythm, No Murmur


Gastrointestinal:  Non Tender, Abnormal Bowel Sounds (hypoactive bowel sounds)


Extremity:  Normal Capillary Refill, Non Tender


Neurologic/Psychiatric:  Alert, Oriented x3, Aphasia, Motor Weakness (0 strength

in RUE and RLE, 4+ strength in LUE and LLE)


Skin:  Normal Color, Warm/Dry


Lymphatic:  No Adenopathy





Results/Procedures


Lab


Laboratory Tests


7/14/22 05:21








Patient resulted labs reviewed.





Assessment/Plan


Assessment and Plan


Assess & Plan/Chief Complaint


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


Acute respiratory distress requiring supplemental oxygen


Angioedema of tongue


HTN





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


   -Carotid ultrasound unremarkable


   -Cardiology consulted, appreciate their recommendations


   -CT head showed atrophy and chronic ischemic microvascular changes with lunar

infarcts in Left basal ganglia


   -CTA showed no large vessel occlusion


   -MRI recommended, but couldn't be done d/t body habitus


   -Speech therapy, Physical therapy and Occupational therapy


   -Inpatient Rehab Facility eval pending insurance approval


   -NPO


   -Consulted general surgery for PEG tube placement, appreciate their 

assistance


      -Pt and family agreeable to placement of PEG tube with short term 

intubation, but no tracheal intubation


      -Discussed goals of care with family who are agreeable to plan


Acute respiratory distress requiring supplemental oxygen


   -Bipap 45%- oxygen demand increasing


   -ABG 7.36/45/98


   -CXR showed cardiomegaly and central congestion


Angioedema of tongue


   -Hold lisinopril


   -Speech therapy advised NPO


   -Improving


HTN


   -Improving


Diabetes


   -SSI





Diet-NPO, PEG tube to be placed soon


DVT prophylaxis- SCDs and Lovenox





GILDA HERNADEZ DO 7/15/22 0554:


Subjective


Subjective/Events-last exam


Pt is getting more complicated


BiPAP has helped his ABG


PEG tube will require intubation and they are agreeable to that


If he does maintain intubation long term he doesn't want a trach and he will be 

placed on comfort care


Overall doing very well otherwise


Review of Systems


Pulmonary:  Dyspnea





Objective


Exam


General Appearance:  Chronically ill, Mild Distress, Obese


Respiratory:  No Accessory Muscle Use, No Respiratory Distress, Decreased Breath

Sounds


Cardiovascular:  Regular Rate, Rhythm





Assessment/Plan


Assessment and Plan


Assess & Plan/Chief Complaint


PEG


Intubation short term if necessary





Supervisory-Addendum Brief


Verification & Attestation


Participated in pt care:  history, MDM, physical


Personally performed:  exam, history, MDM, supervision of care


Care discussed with:  Medical Student


Procedures:  n/a


Results interpretation:  Verified all documentation


Verification and Attestation of Medical Student E/M Service





A medical student performed and documented this service in my presence. I 

reviewed and verified all information documented by the medical student and made

modifications to such information, when appropriate. I personally performed the 

physical exam and medical decision making. 





 Gilda Hernadez, Jul 15, 2022,05:53











FRANCO PAZ MED STUDENT    Jul 14, 2022 11:59


GILDA HERNADEZ DO                Jul 15, 2022 05:54

## 2022-07-14 NOTE — SPEECH THERAPY PROGRESS NOTE
Therapy Progress Note


The patient remains on increased respiratory support (BiPAP) at this time and is

not appropriate for P.O. trials or participation in speech pathology treatment. 

Speech pathology will continue to monitor the patient's chart for progress and 

plan of care updates.











EZRA IYER               Jul 14, 2022 14:02

## 2022-07-14 NOTE — ANESTHESIA-GENERAL POST-OP
MAC


Patient Condition


Mental Status/LOC:  Same as Preop


Cardiovascular:  Satisfactory


Nausea/Vomiting:  Absent


Respiratory:  Satisfactory


Pain:  Controlled


Complications:  Absent





Post Op Complications


Complications


None





Follow Up Care/Instructions


Patient Instructions


None needed.





Anesthesiology Discharge Order


Discharge Order


Patient is doing well in PACU, no complaints, respiratory status is at baseline 

and having no pain, stable vital signs, no apparent adverse anesthesia problems.

  


No complications reported per nursing.











ABRAN BLANCO DO         Jul 14, 2022 15:59

## 2022-07-14 NOTE — SPEECH THERAPY PROGRESS NOTE
Therapy Progress Note


The patient remains on increased respiratory support (BiPAP) at this time and is

not appropriate for P.O. trials. Speech pathology will continue to monitor the 

patient's chart for progress and plan of care updates.











EZRA IYER               Jul 14, 2022 09:10

## 2022-07-14 NOTE — PROGRESS NOTE-POST OPERATIVE
Post-Operative Progess Note


Surgeon (s)/Assistant (s)


Surgeon


CHAVEZ BUCIO MD


Assistant:  alycia jimenez APRN





Pre-Operative Diagnosis


dysphagia, CVA





Post-Operative Diagnosis





same





Procedure & Operative Findings


Date of Procedure


7/14/22


Procedure Performed/Findings


PEG tube placement


Anesthesia Type


mac with local





Estimated Blood Loss


Estimated blood loss (mL):  minimal





Specimens/Packing


Specimens Removed


none











CHAVEZ BUCIO MD                Jul 14, 2022 15:43

## 2022-07-14 NOTE — PROGRESS NOTE-PRE OPERATIVE
Pre-Operative Progress Note


H&P Reviewed


The H&P was reviewed, patient examined and no changes noted.


Date Seen by Provider:  Jul 14, 2022


Time Seen by Provider:  13:00


Date H&P Reviewed:  Jul 13, 2022


Time H&P Reviewed:  13:00


Pre-Operative Diagnosis:  dysphagia, CVA











CHAVEZ BUCIO MD                Jul 14, 2022 13:11

## 2022-07-14 NOTE — PHYSICAL THERAPY DAILY NOTE
PT Daily Note-Current


Subjective


Patient in bed pre tx, agrees to PT,has no complaints of pain.





Appearance


Patient in bed post tx with nurse call, phone, tray, all needs met.





Mental Status


Patient Orientation:  Person, Unable to Assess, Non-Verbal/Aphasic


Attachments:  SCD's, Oxygen (bipap), Nuñez Catheter, IV





Transfers


SCALE: Activities may be completed with or without assistive devices.





6-Indepedent-patient completes the activity by him/herself with no assistance 

from a helper.


5-Set-up or Clean-up Assistance-helper sets up or cleans up; patient completes 

activity. [place] assists only prior to or  


    following the activity.


4-Supervision or Touching Assistance-helper provides verbal cues and/or 

touching/steadying and/or contact guard assistance as patient completes 

activity. Assistance may be provided   


    throughout the activity or intermittently.


3-Partial/Moderate Assistance-helper does LESS THAN HALF the effort. [place] 

lifts, holds or supports trunk or limbs, but provides less than half the effort.


2-Substantial/Maximal Assistance-helper does MORE THAN HALF the effort. [place] 

lifts or holds trunk or limbs and provides more than half the effort.


8-Aqvfdlfcz-fzdzbb does ALL the effort. Patient does none of the effort to 

complete the activity. Or, the assistance of 2 or more helpers is required for 

the patient to complete the  


    activity.


If activity was not attempted, code reason:


7-Patient Refused.


9-Not Applicable-not attempted and the patient did not perform the activity 

before the current illness, exacerbation or injury.


10-Not Attempted due to Environmental Limitations-(lack of equipment, weather 

restraints, etc.).


88-Not Attempted due to Medical Conditions or Safety Concerns.


Roll Left & Right (QC):  1


Sit to Lying (QC):  1


Lying to Sitting/Side of Bed(Q:  1


Sit to Stand (QC):  1


Assist of 3 to sit patient to the side of the bed, patient stands x2 

dependently, therapist wipes patients bottom during one of the stands, patient 

cannot stand completely, stands about 50% of the way, assist of 3 to lay back 

down, position patient on left side for pressure relief and scoot up in bed.





Treatments


bed mobility, standing





Assessment


Current Status:  Poor Progress


Patient seemed to assist with standing slightly but was hard to tell, sitting 

balance was a little better.





PT Long Term Goals


Long Term Goals


PT Long Term Goals Time Frame:  Jul 18, 2022


Roll Left & Right (QC):  3


Sit to Lying (QC):  3


Lying-Sitting on Side/Bed(QC):  3


Sit to Stand (QC):  3


Chair/Bed-to-Chair Xfer(QC):  2





PT Plan


Problem List


Problem List:  Activity Tolerance, Functional Strength, Safety, Balance, Gait, 

Transfer, Bed Mobility, ROM





Treatment/Plan


Treatment Plan:  Continue Plan of Care


Treatment Plan:  Bed Mobility, Education, Functional Activity Promise, Functional 

Strength, Gait, Safety, Therapeutic Exercise, Transfers


Treatment Duration:  Jul 18, 2022


Frequency:  6 times per week


Estimated Hrs Per Day:  .25 hour per day


Patient and/or Family Agrees t:  Yes





Safety Risks/Education


Patient Education:  Correct Positioning, Safety Issues


Teaching Recipient:  Patient


Teaching Methods:  Demonstration, Discussion


Response to Teaching:  Reinforcement Needed





Time/GCodes


Time In:  1125


Time Out:  1140


Total Billed Treatment Time:  15


Total Billed Treatment


1 visit


FA 15'











KRTEK,ALYSSIA PT                Jul 14, 2022 11:49

## 2022-07-15 VITALS — SYSTOLIC BLOOD PRESSURE: 154 MMHG | DIASTOLIC BLOOD PRESSURE: 86 MMHG

## 2022-07-15 VITALS — DIASTOLIC BLOOD PRESSURE: 95 MMHG | SYSTOLIC BLOOD PRESSURE: 157 MMHG

## 2022-07-15 VITALS — SYSTOLIC BLOOD PRESSURE: 169 MMHG | DIASTOLIC BLOOD PRESSURE: 96 MMHG

## 2022-07-15 VITALS — DIASTOLIC BLOOD PRESSURE: 89 MMHG | SYSTOLIC BLOOD PRESSURE: 136 MMHG

## 2022-07-15 LAB
ALBUMIN SERPL-MCNC: 3.4 GM/DL (ref 3.2–4.5)
ALP SERPL-CCNC: 66 U/L (ref 40–136)
ALT SERPL-CCNC: 9 U/L (ref 0–55)
BASOPHILS # BLD AUTO: 0.1 10^3/UL (ref 0–0.1)
BASOPHILS NFR BLD AUTO: 1 % (ref 0–10)
BILIRUB SERPL-MCNC: 1.2 MG/DL (ref 0.1–1)
BUN/CREAT SERPL: 18
CALCIUM SERPL-MCNC: 8.9 MG/DL (ref 8.5–10.1)
CHLORIDE SERPL-SCNC: 107 MMOL/L (ref 98–107)
CO2 SERPL-SCNC: 21 MMOL/L (ref 21–32)
CREAT SERPL-MCNC: 0.62 MG/DL (ref 0.6–1.3)
EOSINOPHIL # BLD AUTO: 0.3 10^3/UL (ref 0–0.3)
EOSINOPHIL NFR BLD AUTO: 3 % (ref 0–10)
GFR SERPLBLD BASED ON 1.73 SQ M-ARVRAT: 115 ML/MIN
GLUCOSE SERPL-MCNC: 90 MG/DL (ref 70–105)
HCT VFR BLD CALC: 39 % (ref 40–54)
HGB BLD-MCNC: 12.4 G/DL (ref 13.3–17.7)
LYMPHOCYTES # BLD AUTO: 1.7 10^3/UL (ref 1–4)
LYMPHOCYTES NFR BLD AUTO: 17 % (ref 12–44)
MANUAL DIFFERENTIAL PERFORMED BLD QL: NO
MCH RBC QN AUTO: 27 PG (ref 25–34)
MCHC RBC AUTO-ENTMCNC: 32 G/DL (ref 32–36)
MCV RBC AUTO: 86 FL (ref 80–99)
MONOCYTES # BLD AUTO: 1 10^3/UL (ref 0–1)
MONOCYTES NFR BLD AUTO: 11 % (ref 0–12)
NEUTROPHILS # BLD AUTO: 6.6 10^3/UL (ref 1.8–7.8)
NEUTROPHILS NFR BLD AUTO: 68 % (ref 42–75)
PLATELET # BLD: 351 10^3/UL (ref 130–400)
PMV BLD AUTO: 9.8 FL (ref 9–12.2)
POTASSIUM SERPL-SCNC: 3.6 MMOL/L (ref 3.6–5)
PROT SERPL-MCNC: 6.7 GM/DL (ref 6.4–8.2)
SODIUM SERPL-SCNC: 142 MMOL/L (ref 135–145)
WBC # BLD AUTO: 9.7 10^3/UL (ref 4.3–11)

## 2022-07-15 RX ADMIN — CLOPIDOGREL BISULFATE SCH MG: 75 TABLET, FILM COATED ORAL at 08:55

## 2022-07-15 RX ADMIN — NYSTATIN SCH GM: 100000 CREAM TOPICAL at 14:46

## 2022-07-15 RX ADMIN — METOPROLOL TARTRATE SCH MG: 1 INJECTION, SOLUTION INTRAVENOUS at 03:16

## 2022-07-15 RX ADMIN — INSULIN ASPART SCH UNIT: 100 INJECTION, SOLUTION INTRAVENOUS; SUBCUTANEOUS at 20:51

## 2022-07-15 RX ADMIN — METOPROLOL TARTRATE SCH MG: 50 TABLET, FILM COATED ORAL at 20:51

## 2022-07-15 RX ADMIN — EMPAGLIFLOZIN SCH MG: 10 TABLET, FILM COATED ORAL at 20:51

## 2022-07-15 RX ADMIN — MICONAZOLE NITRATE SCH GM: 20 POWDER TOPICAL at 14:46

## 2022-07-15 RX ADMIN — INSULIN ASPART SCH UNIT: 100 INJECTION, SOLUTION INTRAVENOUS; SUBCUTANEOUS at 06:13

## 2022-07-15 RX ADMIN — SODIUM CHLORIDE SCH MLS/HR: 900 INJECTION, SOLUTION INTRAVENOUS at 18:55

## 2022-07-15 RX ADMIN — SODIUM CHLORIDE SCH MLS/HR: 900 INJECTION, SOLUTION INTRAVENOUS at 03:16

## 2022-07-15 RX ADMIN — NYSTATIN SCH GM: 100000 CREAM TOPICAL at 14:47

## 2022-07-15 RX ADMIN — ENOXAPARIN SODIUM SCH MG: 100 INJECTION SUBCUTANEOUS at 14:47

## 2022-07-15 RX ADMIN — NYSTATIN SCH GM: 100000 CREAM TOPICAL at 21:06

## 2022-07-15 RX ADMIN — MICONAZOLE NITRATE SCH GM: 20 POWDER TOPICAL at 21:06

## 2022-07-15 RX ADMIN — INSULIN ASPART SCH UNIT: 100 INJECTION, SOLUTION INTRAVENOUS; SUBCUTANEOUS at 12:34

## 2022-07-15 RX ADMIN — FAMOTIDINE SCH MG: 20 TABLET, FILM COATED ORAL at 08:55

## 2022-07-15 RX ADMIN — METOPROLOL TARTRATE SCH MG: 50 TABLET, FILM COATED ORAL at 14:46

## 2022-07-15 RX ADMIN — FAMOTIDINE SCH MG: 20 TABLET, FILM COATED ORAL at 20:51

## 2022-07-15 RX ADMIN — ENOXAPARIN SODIUM SCH MG: 100 INJECTION SUBCUTANEOUS at 23:25

## 2022-07-15 RX ADMIN — INSULIN ASPART SCH UNIT: 100 INJECTION, SOLUTION INTRAVENOUS; SUBCUTANEOUS at 18:55

## 2022-07-15 RX ADMIN — ASPIRIN SCH MG: 81 TABLET ORAL at 08:55

## 2022-07-15 RX ADMIN — AMLODIPINE BESYLATE SCH MG: 5 TABLET ORAL at 09:00

## 2022-07-15 RX ADMIN — METOPROLOL TARTRATE SCH MG: 1 INJECTION, SOLUTION INTRAVENOUS at 08:55

## 2022-07-15 NOTE — SPEECH THERAPY DAILY NOTE
Speech Daily Progress Note


Subjective


Date Seen by Provider:  Jul 15, 2022


Time Seen by Provider:  10:20


The patient was lying in bed, awake and alert upon entrance to his room by the 

clinician. The patient made immediate eye contact following a verbal greeting 

from the clinician. The patient's wife remains at bedside. The patient was 

agreeable to participation in the skilled speech pathology treatment session. 





The patient continues to use the [place] to clear oral secretions, with visible 

anterior loss of oral secretions present from the left labial side. Resting to

ngue protrusion is present from the oral cavity. The patient additionally 

displays rigorous coughing following "dry" swallow attempts which the clinician 

believes is secondary to aspiration of his own secretions.





The patient is currently receiving VapoTherm. 








Objective


The patient's vocal quality is "wet" at baseline. The patient is provided five 

ice chips and three coated teaspoons of puree.





The patient displayed a delayed, rigorous cough, red face, and watery eyes follo

wing two ice chips. The patient demonstrated an immediate, rigorous cough, red 

face, and watery eyes with one of three puree trials. Increased manipulation of 

the puree consistency was noted with the patient's tongue. The patient 

consistently displayed a cough between bolus trials, as continued aspiration of 

secretions were suspected. The patient's vocal quality was overtly "wet" f

ollowing P.O. trials. P.O. trials were terminated at this time for patient 

safety and due to the overt s/s of suspected aspiration displayed with each 

trial.





The patient continues to communicate wants and needs efficiently through non-

verbal gestures, a communication board (spelling), and yes/no responses. The 

patient is able to vocalize following requests from the clinician and was 

encouraged to continue expressive verbal communication attempts regardless of 

the dysarthria present. The clinician discussed AAC options with the wife, 

agreeing that a table with text to speech apps remain the most appropriate.





The clinician discussed with the patient's wife and the patient the continued 

recommendations of N.P.O., the minimal progress with the oropharyngeal swallow 

function, and possible timeline of rehabilitation. Per patient's wife, "I know 

these signs are concerning to you guys but this is how he always is." Due to 

this information, the clinician suspects prolonged rehabilitation with a guarded

outcome. The patient was provided a swallowing exercise of "effortful 

swallowing" to practice between treatment sessions.





The clinician continues to recommend:


- Strict N.P.O. with total PEG tube nutrition.


- Frequent and excellent oral care to reduce the transfer of oral bacteria to 

the lungs should aspiration of secretions occur.


- Moist oral swabs (with the water removed) for oral comfort.


- The patient should attempt to swallow his own secretions, relying less on the 

[place]. as his own secretions are excellent oropharyngeal swallowing 

rehabilitation.





The patient's RN were present following the session and agreed with the contin

ed recommendations. The clinician believes the oropharyngeal dysphagia is not 

an acute process and will require long-term rehabilitation with limited/guarded 

improvement.





Assessment


Assessment Current Status:  Poor Progress





Treatment Plan


Continue Plan of Care





Speech Short Term Goals


Short Term Goals


Short Term Goals


1. The patient will tolerate the least restrictive consistency of P.O. trials 

for possible advancement of a P.O. diet consistency.





2. The patient will demonstrate intelligibility strategies with 50% accuracy and

maximum clinician verbal and visual cueing.


Time Frame-STG:  Three Weeks.





Speech Long Term Goals


Long Term Goals


1. The patient will tolerate the least restrictive consistency without s/s of 

suspected aspiration.





2. The patient will improve expressive communication for increased safety with 

discharge to the least restricted environment.


Time Frame:  One Week.





Speech-Plan


Treatment Plan


Speech Therapy Treatment Plan:  Continue Plan of Care


Treatment Duration:  Jul 25, 2022


Frequency:  4 times per week (Four to five times per week.)


Estimated Hrs Per Day:  .25 hour per day


Rehab Potential:  Poor





Safety Risks/Education


Teaching Recipient:  Patient, Family


Teaching Methods:  Discussion


Response to Teaching:  Reinforcement Needed


Education Topics Provided:  


Plan of Care, Recommendations





Time


Speech Therapy Time In:  10:20


Speech Therapy Time Out:  10:50


Total Billed Time:  30


Billed Treatment Time


ISAIAS Reid [facility] No LOY, [facility]               Jul 15, 2022 11:52

## 2022-07-15 NOTE — PHYSICAL THERAPY DAILY NOTE
PT Daily Note-Current


Subjective


Patient and spouse agree to exercises bilateral LE





Transfers


SCALE: Activities may be completed with or without assistive devices.





6-Indepedent-patient completes the activity by him/herself with no assistance 

from a helper.


5-Set-up or Clean-up Assistance-helper sets up or cleans up; patient completes 

activity. [place] assists only prior to or  


    following the activity.


4-Supervision or Touching Assistance-helper provides verbal cues and/or 

touching/steadying and/or contact guard assistance as patient completes 

activity. Assistance may be provided   


    throughout the activity or intermittently.


3-Partial/Moderate Assistance-helper does LESS THAN HALF the effort. [place] 

lifts, holds or supports trunk or limbs, but provides less than half the effort.


2-Substantial/Maximal Assistance-helper does MORE THAN HALF the effort. [place] 

lifts or holds trunk or limbs and provides more than half the effort.


8-Xmlhpomfb-yoogqa does ALL the effort. Patient does none of the effort to 

complete the activity. Or, the assistance of 2 or more helpers is required for 

the patient to complete the  


    activity.


If activity was not attempted, code reason:


7-Patient Refused.


9-Not Applicable-not attempted and the patient did not perform the activity 

before the current illness, exacerbation or injury.


10-Not Attempted due to Environmental Limitations-(lack of equipment, weather 

restraints, etc.).


88-Not Attempted due to Medical Conditions or Safety Concerns.





Exercises


Supine Ex:  Ankle pumps, Heel Slides, Straight leg raise, Hip abd/add


Supine Reps:  15 (PROM right LE/AAROM left LE)





Assessment


Patient c/o abdominal pain with exercise due to PEG placement yesterday.  

Increase activity as tolerated by patient.





PT Long Term Goals


Long Term Goals


PT Long Term Goals Time Frame:  Jul 18, 2022


Roll Left & Right (QC):  3


Sit to Lying (QC):  3


Lying-Sitting on Side/Bed(QC):  3


Sit to Stand (QC):  3


Chair/Bed-to-Chair Xfer(QC):  2





PT Plan


Treatment/Plan


Treatment Plan:  Continue Plan of Care


Treatment Plan:  Bed Mobility, Education, Functional Activity Promise, Functional 

Strength, Gait, Safety, Therapeutic Exercise, Transfers


Treatment Duration:  Jul 18, 2022


Frequency:  6 times per week


Estimated Hrs Per Day:  .25 hour per day


Patient and/or Family Agrees t:  Yes





Time/GCodes


Time In:  887


Time Out:  818


Total Billed Treatment Time:  9


Total Billed Treatment


1 visit


EX 9 min











MERRILL LAWLER PT              Jul 15, 2022 09:42

## 2022-07-15 NOTE — OPERATIVE REPORT
DATE OF SERVICE:  07/14/2022



ATTENDING PRIMARY CLINICIAN:

[facility].



ADMITTING PHYSICIAN:

Dr. Miguel Castro.



PREOPERATIVE DIAGNOSES:

Bilateral cerebrovascular accident with dysphagia.



POSTOPERATIVE DIAGNOSES:

Bilateral cerebrovascular accident with dysphagia.



PROCEDURE:

EGD with placement of percutaneous endoscopic gastrostomy tube.



SURGEON:

Chavez Bucio MD.



ANESTHESIA:

Monitored anesthesia care with local.



ESTIMATED BLOOD LOSS:

Minimal.



FINDINGS:

Reflux esophagitis, [place] grade C, moderate gastritis.  No distal

obstructions.



DISPOSITION:

The patient tolerated the procedure well.



INDICATIONS:

The patient is a 51-year-old male who initially had significant issues with

COVID infection approximately 01/2021.  He had TIA versus stroke-like symptoms

and has had chronic debility since that time.  At home, he had a fall in the

bathroom with increased weakness on the left side.  They are unsure of any loss

of consciousness at that time.  He did have slurred speech.  The patient did

have a CT scan performed, which did show a lacunar infarct of the left basal

ganglia.  This was nonspecific and an MRI was recommended; however,

contraindicated due to the patient's body habitus.  Further recommendations by

tertiary center was to empirically treat the patient with aspirin and Plavix. 

The patient has had issues with dysphagia and speech pathology has been

consulted and the patient has had continued difficulty swallowing as well as

dysarthria and inability to swallow secretions.



DESCRIPTION OF PROCEDURE:

The patient was brought to the operating room, laid supine on the table.  After

adequate IV pain and sedative medications and monitored anesthesia care, the

mouthpiece was applied.



The endoscope was placed in the mouth, visualizing the pharynx and

hypopharyngeal region.  Vocal cords, epiglottis and vallecula identified and

appeared to be normal.  The endoscope was then gently intubated into the

esophageal opening and esophagus insufflated.



At the level of the GE junction, a reflux esophagitis, [place] grade C

identified.



The endoscope was then advanced in the stomach, where a moderate gastritis was

noted.  The endoscope was then advanced to the pylorus and the first and second

portion of the duodenum with no distal obstructions identified.



The anterior abdominal wall was palpated, which was visualized through the

anterior wall of the stomach through the endoscope.  This area was then

anesthetized using 1% lidocaine.  A vertical skin incision was made using 11

blade and the needle, trocar and sheath were introduced under direct

visualization through the endoscope.  The guidewire was then inserted through

the trocar and looped through the scope and pulled out the mouth.  The

gastrostomy tube was then placed onto the wire and pulled through and a rubber

bolster was firmly opposing the gastric wall.  The bactericidal iodine was then

placed onto the exit site.  The catheter cut down to size and the external

rubber bolster was placed after drain sponges were placed.  The catheter cut

down to size and the rubber stopper placed on to the gastrostomy tube.  The

endoscope was then withdrawn while taking a second look and suctioning of

residual air with no additional findings.



The patient tolerated the procedure well.  The gastrostomy tube will be accessed

and used at any time.





Job ID: [id number]

DocumentID: [id number]

Dictated Date:  07/14/2022 15:49:14

Transcription Date: 07/15/2022 04:07:52

Dictated By: CHAVEZ BUCIO MD

## 2022-07-15 NOTE — PROGRESS NOTE - CARDIOLOGY
Cardiology SOAP Progress Note


Subjective:


Communicating via letter board


Spouse at the bedside


No c/o at this time


S/P successful PEG tube placement yesterday





Objective:


I&O/Vital Signs











 7/17/22 7/18/22 7/18/22 7/18/22





 22:30 00:00 02:23 03:52


 


Temp  36.0  35.6


 


Pulse  88  90


 


Resp  18  17


 


B/P (MAP)  136/82 (100)  136/79 (98)


 


Pulse Ox 94 95 95 92


 


O2 Delivery Vapotherm Vapotherm Vapotherm Vapotherm


 


O2 Flow Rate 25.00 25.00 25.00 25.00





  45.00  45.00


 


FiO2 45  45 


 


    





 7/18/22 7/18/22  





 06:05 07:52  


 


Temp  37.4  


 


Pulse  115  


 


Resp  20  


 


B/P (MAP)  164/83 (110)  


 


Pulse Ox 91 88  


 


O2 Delivery High Flow N/C High Flow N/C  


 


O2 Flow Rate 5.00 5.00  














 7/18/22





 00:00


 


Intake Total 390 ml


 


Output Total 1750 ml


 


Balance -1360 ml








Constitutional:  well-developed, other (non-verbal - nods head slowly in 

response to questions)


Respiratory:  No accessory muscle use, No respiratory distress; chest expansion 

is symmetric, chest is bilaterally symmetric, other (coarse breath sounds with 

productive cough)


Cardiovascular:  regular rate-rhythm; No JVD; tachycardia, S1 and S2


Gastrointestional:  No tender; soft, round, audible bowel sounds, other (PEG 

tube in place)


Extremities:  no lower extremity edema bilateral


Neurologic/Psychiatric:  other (RUE and RLE flaccid, facial droop right sided; 

LLE and LUE chronically weak 4/5; aphasic)


Skin:  No rash on exposed areas, No ulcerations on exposed areas





Results/Procedures:


Labs


Laboratory Tests


7/17/22 11:50: Glucometer 128H


7/17/22 17:16: Glucometer 124H


7/18/22 00:13: Glucometer 115H


7/18/22 05:13: Glucometer 126H


7/18/22 05:50: 


White Blood Count 10.4, Red Blood Count 4.83, Hemoglobin 13.4, Hematocrit 41, 

Mean Corpuscular Volume 85, Mean Corpuscular Hemoglobin 28, Mean Corpuscular 

Hemoglobin Concent 33, Red Cell Distribution Width 13.2, Platelet Count 406H, 

Mean Platelet Volume 10.0, Immature Granulocyte % (Auto) 0, Neutrophils (%) 

(Auto) 70, Lymphocytes (%) (Auto) 15, Monocytes (%) (Auto) 10, Eosinophils (%) 

(Auto) 4, Basophils (%) (Auto) 1, Neutrophils # (Auto) 7.4, Lymphocytes # (Auto)

1.5, Monocytes # (Auto) 1.0, Eosinophils # (Auto) 0.4H, Basophils # (Auto) 0.1, 

Immature Granulocyte # (Auto) 0.0, Sodium Level 144, Potassium Level 3.4L, 

Chloride Level 104, Carbon Dioxide Level 23, Anion Gap 17H, Blood Urea Nitrogen 

14, Creatinine 0.73, Estimat Glomerular Filtration Rate 109, BUN/Creatinine 

Ratio 19, Glucose Level 119H, Calcium Level 9.4, Corrected Calcium 9.8, Total 

Bilirubin 0.7, Aspartate Amino Transf (AST/SGOT) 14, Alanine Aminotransferase 

(ALT/SGPT) 10, Alkaline Phosphatase 74, Total Protein 7.2, Albumin 3.5





Microbiology


7/12/22 MRSA Screen - Final, Complete


          MRSA not isolated








A/P:


Assessment:


CVA with right sided hemiparesis, aphasia, dysphagia


- reported h/o several CVA/TIA's following COVID infection in 2021 with left 

sided weakness - presents this time with righ sided hemipares

is/aphasia/dysphagia


- The stroke team at  was consulted with no further recommendations other than

continuing aspirin and Plavix and obtaining an MRI


- Management per stroke team


- Follows with Dr. Ramirez of neurology services at [facility]


- Carotid u/s7-11-22:  Very limited exam. The right carotid artery shows no 

hemodynamic disease with minimal plaquing.  Left carotid system and vertebral 

arteries were not adequately evaluated due to patient's pain and position. 

Previous CT angiography of the neck on 07/09/2022 suggested


the carotid and vertebral arteries to be widely patent without significant 

stenosis


- ILR implanted on 7/12/22 to eval for occult A Fib as the source of his CVA





?Angioedema


- Lisinopril stopped





Fall at home





Sleep apnea


- non-complaint with sleep study





? aspiration


- S/P PEG tube placement on 7-14-22 by Dr. Olmedo





HTN





Crohn's dz


- h/o colectomy





DM 2


Plan:





CVA


- management per stroke team - please see recs per [facility] as above


- source undetermined - implanted ILR 7-12-22 to r/o possible a-fib/flutter as 

cause 


S/P PEG tube placement - change IV meds to oral


Monitor lab closely


Replace electrolytes











ROXANNE BRADY           Jul 15, 2022 09:00

## 2022-07-15 NOTE — OCCUPATIONAL THER DAILY NOTE
OT Current Status-Daily Note


Subjective


Pt laying in bed with wife present at bedside upon OT arrival, agreeable to tx. 

Pt was much more interactive during tx today. Pt is back on O2 via NC, so he 

attempted to verbally communicate with clinician more, though he speech is still

slurred, resorted to communication via hand gestures and printable communication

sheet.





Mental Status/Objective


Patient Orientation:  Person, Place, Non-Verbal/Aphasic, Situation


Attachments:  Drains, Nuñez Catheter, IV, Oxygen, PEG Tube, Telemetry





ADL-Treatment


Therapy Code Descriptions/Definitions 





Functional [place] Measure:


0=Not Assessed/NA        4=Minimal Assistance


1=Total Assistance        5=Supervision or Setup


2=Maximal Assistance  6=Modified [place]


3=Moderate Assistance 7=Complete IndependenceSCALE: Activities may be completed 

with or without assistive devices.





6-Indepedent-patient completes the activity by him/herself with no assistance 

from a helper.


5-Set-up or Clean-up Assistance-helper sets up or cleans up; patient completes 

activity. [place] assists only prior to or  


    following the activity.


4-Supervision or Touching Assistance-helper provides verbal cues and/or 

touching/steadying and/or contact guard assistance as patient completes 

activity. Assistance may be provided   


    throughout the activity or intermittently.


3-Partial/Moderate Assistance-helper does LESS THAN HALF the effort. [place] 

lifts, holds or supports trunk or limbs, but provides less than half the effort.


2-Substantial/Maximal Assistance-helper does MORE THAN HALF the effort. [place] 

lifts or holds trunk or limbs and provides more than half the effort.


6-Eshuxnwcl-ypfpsv does ALL the effort. Patient does none of the effort to 

complete the activity. Or, the assistance of 2 or more helpers is required for 

the patient to complete the  


    activity.


If activity was not attempted, code reason:


7-Patient Refused.


9-Not Applicable-not attempted and the patient did not perform the activity 

before the current illness, exacerbation or injury.


10-Not Attempted due to Environmental Limitations-(lack of equipment, weather 

restraints, etc.).


88-Not Attempted due to Medical Conditions or Safety Concerns.





Other Treatment


Pt remained supine in bed throughout duration of tx. Clinician used bath wipes 

to wipe pt's BUE, chest, abdomen, and face prior to beginning of exercises. Pt 

then participated in 10 reps of the following UE exercises with the goal of 

increasing neuromuscular communication in RUE and AROM in LUE: shoulder flexion,

bicep curls, and fist pumps. Pt was able to complete exercises LUE with AROM and

RUE PROM. Wife said that pt is working on self PROM throughout the day when 

therapy is not present. Post tx, pt left in bed with call light in reach and all

needs met.





Education


OT Patient Education:  Correct positioning, Energy conservation, Instructions to

caregiver, Modified ADL techniques, Progress toward Goal/Update tx plan, Purpose

of tx/functional activities, Rehab process, Safety issues


Teaching Recipient:  Patient, Significant Other


Teaching Methods:  Discussion


Response to Teaching:  Verbalize Understanding





OT Long Term Goals


Long Term Goals


Time Frame:  Aug 12, 2022


Eating (QC):  3


Toileting Hygiene (QC):  3


Shower/Bathe Self (QC):  2


Upper Body Dressing (QC):  3


Lower Body Dressing (QC):  2


On/Off Footwear (QC):  2


Additional Goals:  1-Demonstrate ADL Tasks, 2-Verbalize Understanding, 3-

ImproveStrength/Promise


1=Demonstrate adherence to instructed precautions during ADL tasks.


2=Patient will verbalize/demonstrate understanding of assistive 

devices/modifications for ADL.


3=Patient will improve strength/tolerance for activity to enable patient to 

perform ADL's.





OT Education/Plan


Problem List/Assessment


Assessment:  Decreased Activ Tolerance, Decreased UE Strength, Dependent Tr

ansfers, Impaired Bed Mobility, Impaired Coordination, Impaired Funct Balance, 

Impaired I ADL's, Impaired Self-Care Skills, Restricted Funct UE ROM


Pt would benefit from skilled OT services in order to increase functional use of

RUE, improve independence and safety with ADLs and functional mobility, and for 

neuromuscular reeducation in order to maximize LOF for safe return home with 

spouse.





Discharge Recommendations


Plan/Recommendations:  Continue POC





Treatment Plan/Plan of Care


Patient would benefit from OT for education, treatment and training to promote 

independence in ADL's, mobility, safety and/or upper extremity function for 

ADL's.


Plan of Care:  ADL Retraining, Functional Mobility, UE Funct Exercise/Act, UE 

Neuromus Re-Ed/Coord


Treatment Duration:  Aug 12, 2022


Frequency:  5 times per week


Estimated Hrs Per Day:  .25 hour per day


Rehab Potential:  Poor





Time/GCodes


Start Time:  09:56


Stop Time:  10:10


Total Time Billed (hr/min):  14


Billed Treatment Time


1, NM











AVERY MARTINEZ OT          Jul 15, 2022 10:46

## 2022-07-15 NOTE — PROGRESS NOTE
Subjective


Date Seen by a Provider:  Jul 15, 2022


Time Seen by a Provider:  11:00


Subjective/Events-last exam


doing ok. tolerating water and TF's, wound exit site clean/dry.





Objective


Exam





Vital Signs








  Date Time  Temp Pulse Resp B/P (MAP) Pulse Ox O2 Delivery O2 Flow Rate FiO2


 


7/15/22 11:30     96 Vapotherm 35.00 70


 


7/15/22 07:53 36.9 93 16 157/95 (115) 95   


 


7/15/22 07:49     97 Vapotherm 35.00 70


 


7/15/22 07:00  90      


 


7/15/22 04:37      Vapotherm 35.00 





       70.00 


 


7/15/22 04:33      Vapotherm 35.00 





       70.00 


 


7/15/22 04:30     95 Vapotherm 35.00 70


 


7/15/22 03:57 36.3 80 17 136/89 (105) 93 NIV Bilevel  


 


7/15/22 02:20  86 18  98  40.00 


 


7/15/22 01:00  81      


 


7/14/22 23:55  86 17 149/95 (113) 95 NIV Bilevel  


 


7/14/22 22:10  85 25  95  40.00 


 


7/14/22 21:00      NIV Bilevel  45


 


7/14/22 20:00  86 18 184/93 (123) 94 NIV Bilevel 45.00 


 


7/14/22 20:00 36.0       


 


7/14/22 19:00  88      


 


7/14/22 18:37  86 25  94  40.00 


 


7/14/22 18:30  91 24 175/95 (121) 95 NIV Bilevel 45.00 


 


7/14/22 18:00  88 17 187/96 (126) 94 NIV Bilevel 45.00 


 


7/14/22 17:30  87 17 194/95 (128) 93 NIV Bilevel 45.00 


 


7/14/22 17:00  88 17 174/91 (118) 94 NIV Bilevel 45.00 


 


7/14/22 16:45  90 17 182/91 (121) 94 NIV Bilevel 45.00 


 


7/14/22 16:37      NIV Bilevel  50


 


7/14/22 16:30  94 22 181/97 (125) 92 NIV Bilevel 45.00 


 


7/14/22 16:15  92 22 179/100 (126) 92 NIV Bilevel 45.00 


 


7/14/22 16:10 36.2  20 156/89 (111) 96 Non Rebreather 15.00 


 


7/14/22 16:10      Non Rebreather 15.00 


 


7/14/22 16:00      Non Rebreather 15.00 


 


7/14/22 16:00   20 158/89 (112) 96 Non Rebreather 15.00 


 


7/14/22 15:50   20 168/91 (116) 98 Non Rebreather 15.00 


 


7/14/22 15:45      Non Rebreather 15.00 


 


7/14/22 15:41      Non Rebreather 15.00 


 


7/14/22 15:41 36.2  20 163/96 (118) 98 Non Rebreather 15.00 


 


7/14/22 14:00  93 20  95  40.00 


 


7/14/22 13:00  88      


 


7/14/22 12:00  93 24 164/85 (111) 95 NIV Bilevel 35.00 














I & O 


 


 7/15/22





 07:00


 


Intake Total 240 ml


 


Output Total 2250 ml


 


Balance -2010 ml





Capillary Refill : Less Than 3 SecondsLess Than 3 Seconds


General Appearance:  No Apparent Distress


HEENT:  PERRL/EOMI


Neck:  Full Range of Motion


Respiratory:  Decreased Breath Sounds, Rhonci


Cardiovascular:  Regular Rate, Rhythm


Gastrointestinal:  normal bowel sounds, non tender, soft


Extremity:  Normal Capillary Refill


Neurologic/Psychiatric:  Alert, Oriented x3


Skin:  Normal Color


Lymphatic:  No Adenopathy





Results


Lab


Laboratory Tests


7/14/22 12:27: Glucometer 108


7/14/22 18:32: Glucometer 121H


7/15/22 05:25: 


White Blood Count 9.7, Red Blood Count 4.53, Hemoglobin 12.4L, Hematocrit 39L, 

Mean Corpuscular Volume 86, Mean Corpuscular Hemoglobin 27, Mean Corpuscular H

emoglobin Concent 32, Red Cell Distribution Width 13.4, Platelet Count 351, Mean

Platelet Volume 9.8, Immature Granulocyte % (Auto) 1, Neutrophils (%) (Auto) 68,

Lymphocytes (%) (Auto) 17, Monocytes (%) (Auto) 11, Eosinophils (%) (Auto) 3, 

Basophils (%) (Auto) 1, Neutrophils # (Auto) 6.6, Lymphocytes # (Auto) 1.7, 

Monocytes # (Auto) 1.0, Eosinophils # (Auto) 0.3, Basophils # (Auto) 0.1, 

Immature Granulocyte # (Auto) 0.1, Sodium Level 142, Potassium Level 3.6, 

Chloride Level 107, Carbon Dioxide Level 21, Anion Gap 14, Blood Urea Nitrogen 

11, Creatinine 0.62, Estimat Glomerular Filtration Rate 115, BUN/Creatinine 

Ratio 18, Glucose Level 90, Calcium Level 8.9, Corrected Calcium 9.4, Total 

Bilirubin 1.2H, Aspartate Amino Transf (AST/SGOT) 11, Alanine Aminotransferase 

(ALT/SGPT) 9, Alkaline Phosphatase 66, Total Protein 6.7, Albumin 3.4





Microbiology


7/12/22 MRSA Screen - Final, Complete


          MRSA not isolated





Assessment/Plan


Assessment/Plan


Assess & Plan/Chief Complaint


s/p PEG.


ok to use. 


drain sponge daily and PRN.











CHAVEZ BUCIO MD                Jul 15, 2022 11:49

## 2022-07-15 NOTE — PROGRESS NOTE - HOSPITALIST
FRANCO PAZ A MED STUDENT 7/15/22 0952:


Subjective


HPI/CC On Admission


Patient is a 50yo male, h/o COVID about 1.5y ago and multiple neurologic insults

during hospitalization for Covid at that time (TIA vs strokes).  He has had 

chronic debility since then with several ED visits for weakness and strokelike 

symptoms.  Apparently his sons were at home with him and he had a fall in the 

bathroom with increased weakness to the left side.  Unsure of LOC as sons are 

not here at this time for history.  The patient (with significantly slurred 

speech) is able to tell me that his wife is working in [place] - which is 

consistent with prior visits to our ER.  He does state he has a mild headache.  

Otherwise his speech is so slurred he is very difficult to understand.


Upon my arrival patient exhibited right neglect tongue was sticking out mildly 

swollen without erythema.  The patient is eyes were open and did orient to voice

he was slow to respond and very dysarthric unintelligible speech.  He was able 

to follow simple commands but when asked to use his right hand to touch my 

finger he only moved his left hand and was able to do so with good control of 

the left hand.  His wife came later in the day and reports that he had been 

seeing neurologist Dr. Ljoa had a rather extensive work-up was started on 

aspirin and Plavix but no apparent large vessel disease was noted.  He had an 

echocardiogram gram and there is been no evidence for atrial fibrillation.  He 

was having no neurologic issues previous to COVID infection in January 2021.  He

had been doing well at home until the night of his admission when his right leg 

apparently gave out he stumbled forward they helped him to the bed and again 

noted that he was dysarthric and his tongue appeared to be swollen.  There were 

no rash issues reported he was confused but there was no loss of consciousness 

and no reported pallor.  He had been feeling well up until that point.


Subjective/Events-last exam


Pt lying in bed comfortably this morning with wife at bedside. Pt had PEG tube 

placement and is currently on vapotherm. Pt and his wife has no concerns today.





Objective


Exam


Vital Signs





Vital Signs








  Date Time  Temp Pulse Resp B/P (MAP) Pulse Ox O2 Delivery O2 Flow Rate FiO2


 


7/15/22 07:53 36.9 93 16 157/95 (115) 95   


 


7/15/22 07:49      Vapotherm 35.00 70





Capillary Refill : Less Than 3 SecondsLess Than 3 Seconds


General Appearance:  No Apparent Distress, Obese


HEENT:  PERRL/EOMI, Pharynx Normal


Neck:  Full Range of Motion, Normal Inspection


Respiratory:  Chest Non Tender, Other (diffuse course breath sounds bilaterally 

with diminished breath sounds in R base)


Gastrointestinal:  Normal Bowel Sounds, Non Tender


Extremity:  Normal Capillary Refill, Non Tender, No Pedal Edema


Neurologic/Psychiatric:  Alert, Oriented x3, Motor Weakness (0/5 strength in RUE

and RLE, 4/5 strength in LUE and LLE)


Skin:  Normal Color, Warm/Dry


Lymphatic:  No Adenopathy





Results/Procedures


Lab


Laboratory Tests


7/15/22 05:25








Patient resulted labs reviewed.





Assessment/Plan


Assessment and Plan


Assess & Plan/Chief Complaint


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


Acute respiratory distress requiring supplemental oxygen


Angioedema of tongue


HTN





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


   -Carotid ultrasound unremarkable


   -Cardiology consulted, appreciate their recommendations


   -CT head showed atrophy and chronic ischemic microvascular changes with lunar

infarcts in Left basal ganglia


   -CTA showed no large vessel occlusion


   -MRI recommended, but couldn't be done d/t body habitus


   -Speech therapy, Physical therapy and Occupational therapy


   -PEG tube placed


Acute respiratory distress requiring supplemental oxygen


   -Vapotherm 35L at 70% O2


Angioedema of tongue


   -Hold lisinopril


   -Speech therapy advised NPO


   -Improving


HTN


   -Improving


Diabetes


   -SSI





Goals: Decrease oxygen requirement and stabilize pt for IRF





Diet-NPO, PEG tube


DVT prophylaxis- SCDs and Lovenox





GILDA HERNADEZ DO 7/15/22 2129:


Subjective


HPI/CC On Admission


Date Seen by Provider:  Jul 15, 2022


Time Seen by Provider:  09:00


Subjective/Events-last exam


Pt remains on Vapotherm 35/70


PEG tube was placed and functioning great


Will remain on cardiac step down due to Vapotherm





Review of Systems


General:  Fatigue


Pulmonary:  Dyspnea





Objective


Exam


General Appearance:  Anxious, Chronically ill, Obese


Respiratory:  No Accessory Muscle Use, No Respiratory Distress, Decreased Breath

Sounds


Cardiovascular:  Regular Rate, Rhythm


Neurologic/Psychiatric:  Alert, Oriented x3, Depressed Affect, Motor Weakness 

(0/5 strength in RUE and RLE, 4/5 strength in LUE and LLE)





Assessment/Plan


Assessment and Plan


Assess & Plan/Chief Complaint


TF to continue


Monitor lungs


High risk for intubation





Supervisory-Addendum Brief


Verification & Attestation


Participated in pt care:  history, MDM, physical


Personally performed:  exam, history, MDM, supervision of care


Care discussed with:  Medical Student


Procedures:  n/a


Results interpretation:  Verified all documentation


Verification and Attestation of Medical Student E/M Service





A medical student performed and documented this service in my presence. I 

reviewed and verified all information documented by the medical student and made

modifications to such information, when appropriate. I personally performed the 

physical exam and medical decision making. 





 Gilda Hernadez, Jul 15, 2022,21:28











FRANCO PAZ MED STUDENT    Jul 15, 2022 09:52


GILDA HERNADEZ DO                Jul 15, 2022 21:29

## 2022-07-16 VITALS — DIASTOLIC BLOOD PRESSURE: 96 MMHG | SYSTOLIC BLOOD PRESSURE: 170 MMHG

## 2022-07-16 VITALS — SYSTOLIC BLOOD PRESSURE: 151 MMHG | DIASTOLIC BLOOD PRESSURE: 84 MMHG

## 2022-07-16 VITALS — DIASTOLIC BLOOD PRESSURE: 92 MMHG | SYSTOLIC BLOOD PRESSURE: 174 MMHG

## 2022-07-16 VITALS — DIASTOLIC BLOOD PRESSURE: 92 MMHG | SYSTOLIC BLOOD PRESSURE: 168 MMHG

## 2022-07-16 VITALS — SYSTOLIC BLOOD PRESSURE: 160 MMHG | DIASTOLIC BLOOD PRESSURE: 93 MMHG

## 2022-07-16 VITALS — SYSTOLIC BLOOD PRESSURE: 171 MMHG | DIASTOLIC BLOOD PRESSURE: 88 MMHG

## 2022-07-16 VITALS — SYSTOLIC BLOOD PRESSURE: 182 MMHG | DIASTOLIC BLOOD PRESSURE: 102 MMHG

## 2022-07-16 VITALS — DIASTOLIC BLOOD PRESSURE: 89 MMHG | SYSTOLIC BLOOD PRESSURE: 175 MMHG

## 2022-07-16 VITALS — DIASTOLIC BLOOD PRESSURE: 93 MMHG | SYSTOLIC BLOOD PRESSURE: 172 MMHG

## 2022-07-16 VITALS — DIASTOLIC BLOOD PRESSURE: 91 MMHG | SYSTOLIC BLOOD PRESSURE: 159 MMHG

## 2022-07-16 VITALS — SYSTOLIC BLOOD PRESSURE: 164 MMHG | DIASTOLIC BLOOD PRESSURE: 91 MMHG

## 2022-07-16 LAB
ALBUMIN SERPL-MCNC: 3.5 GM/DL (ref 3.2–4.5)
ALP SERPL-CCNC: 67 U/L (ref 40–136)
ALT SERPL-CCNC: 9 U/L (ref 0–55)
BASOPHILS # BLD AUTO: 0.1 10^3/UL (ref 0–0.1)
BASOPHILS NFR BLD AUTO: 1 % (ref 0–10)
BILIRUB SERPL-MCNC: 1.1 MG/DL (ref 0.1–1)
BUN/CREAT SERPL: 17
CALCIUM SERPL-MCNC: 9 MG/DL (ref 8.5–10.1)
CHLORIDE SERPL-SCNC: 107 MMOL/L (ref 98–107)
CO2 SERPL-SCNC: 18 MMOL/L (ref 21–32)
CREAT SERPL-MCNC: 0.7 MG/DL (ref 0.6–1.3)
EOSINOPHIL # BLD AUTO: 0.3 10^3/UL (ref 0–0.3)
EOSINOPHIL NFR BLD AUTO: 3 % (ref 0–10)
GFR SERPLBLD BASED ON 1.73 SQ M-ARVRAT: 111 ML/MIN
GLUCOSE SERPL-MCNC: 89 MG/DL (ref 70–105)
HCT VFR BLD CALC: 40 % (ref 40–54)
HGB BLD-MCNC: 12.7 G/DL (ref 13.3–17.7)
LYMPHOCYTES # BLD AUTO: 1.5 10^3/UL (ref 1–4)
LYMPHOCYTES NFR BLD AUTO: 14 % (ref 12–44)
MANUAL DIFFERENTIAL PERFORMED BLD QL: NO
MCH RBC QN AUTO: 28 PG (ref 25–34)
MCHC RBC AUTO-ENTMCNC: 32 G/DL (ref 32–36)
MCV RBC AUTO: 87 FL (ref 80–99)
MONOCYTES # BLD AUTO: 1 10^3/UL (ref 0–1)
MONOCYTES NFR BLD AUTO: 10 % (ref 0–12)
NEUTROPHILS # BLD AUTO: 7.7 10^3/UL (ref 1.8–7.8)
NEUTROPHILS NFR BLD AUTO: 72 % (ref 42–75)
PLATELET # BLD: 342 10^3/UL (ref 130–400)
PMV BLD AUTO: 9.8 FL (ref 9–12.2)
POTASSIUM SERPL-SCNC: 3.7 MMOL/L (ref 3.6–5)
PROT SERPL-MCNC: 6.8 GM/DL (ref 6.4–8.2)
SODIUM SERPL-SCNC: 140 MMOL/L (ref 135–145)
WBC # BLD AUTO: 10.7 10^3/UL (ref 4.3–11)

## 2022-07-16 RX ADMIN — AMLODIPINE BESYLATE SCH MG: 5 TABLET ORAL at 10:14

## 2022-07-16 RX ADMIN — CLOPIDOGREL BISULFATE SCH MG: 75 TABLET, FILM COATED ORAL at 10:14

## 2022-07-16 RX ADMIN — MICONAZOLE NITRATE SCH GM: 20 POWDER TOPICAL at 10:15

## 2022-07-16 RX ADMIN — SODIUM CHLORIDE SCH MLS/HR: 900 INJECTION, SOLUTION INTRAVENOUS at 06:58

## 2022-07-16 RX ADMIN — EMPAGLIFLOZIN SCH MG: 10 TABLET, FILM COATED ORAL at 20:16

## 2022-07-16 RX ADMIN — INSULIN ASPART SCH UNIT: 100 INJECTION, SOLUTION INTRAVENOUS; SUBCUTANEOUS at 06:02

## 2022-07-16 RX ADMIN — METOPROLOL TARTRATE SCH MG: 50 TABLET, FILM COATED ORAL at 10:14

## 2022-07-16 RX ADMIN — ASPIRIN SCH MG: 81 TABLET ORAL at 10:14

## 2022-07-16 RX ADMIN — FAMOTIDINE SCH MG: 20 TABLET, FILM COATED ORAL at 20:16

## 2022-07-16 RX ADMIN — ENOXAPARIN SODIUM SCH MG: 100 INJECTION SUBCUTANEOUS at 12:17

## 2022-07-16 RX ADMIN — MICONAZOLE NITRATE SCH GM: 20 POWDER TOPICAL at 20:17

## 2022-07-16 RX ADMIN — NYSTATIN SCH GM: 100000 CREAM TOPICAL at 10:15

## 2022-07-16 RX ADMIN — INSULIN ASPART SCH UNIT: 100 INJECTION, SOLUTION INTRAVENOUS; SUBCUTANEOUS at 12:00

## 2022-07-16 RX ADMIN — NYSTATIN SCH GM: 100000 CREAM TOPICAL at 20:17

## 2022-07-16 RX ADMIN — FAMOTIDINE SCH MG: 20 TABLET, FILM COATED ORAL at 10:14

## 2022-07-16 RX ADMIN — METOPROLOL TARTRATE SCH MG: 50 TABLET, FILM COATED ORAL at 20:16

## 2022-07-16 RX ADMIN — INSULIN ASPART SCH UNIT: 100 INJECTION, SOLUTION INTRAVENOUS; SUBCUTANEOUS at 17:44

## 2022-07-16 RX ADMIN — NYSTATIN SCH GM: 100000 CREAM TOPICAL at 13:45

## 2022-07-16 NOTE — PROGRESS NOTE - HOSPITALIST
Subjective


HPI/CC On Admission


Date Seen by Provider:  Jul 16, 2022


Time Seen by Provider:  12:00


Patient is a 50yo male, h/o COVID about 1.5y ago and multiple neurologic insults

during hospitalization for Covid at that time (TIA vs strokes).  He has had 

chronic debility since then with several ED visits for weakness and strokelike s

ymptoms.  Apparently his sons were at home with him and he had a fall in the 

bathroom with increased weakness to the left side.  Unsure of LOC as sons are 

not here at this time for history.  The patient (with significantly slurred 

speech) is able to tell me that his wife is working in [place] - which is 

consistent with prior visits to our ER.  He does state he has a mild headache.  

Otherwise his speech is so slurred he is very difficult to understand.


Upon my arrival patient exhibited right neglect tongue was sticking out mildly 

swollen without erythema.  The patient is eyes were open and did orient to voice

he was slow to respond and very dysarthric unintelligible speech.  He was able 

to follow simple commands but when asked to use his right hand to touch my 

finger he only moved his left hand and was able to do so with good control of 

the left hand.  His wife came later in the day and reports that he had been 

seeing neurologist Dr. Loja had a rather extensive work-up was started on a

spirin and Plavix but no apparent large vessel disease was noted.  He had an 

echocardiogram gram and there is been no evidence for atrial fibrillation.  He 

was having no neurologic issues previous to COVID infection in January 2021.  He

had been doing well at home until the night of his admission when his right leg 

apparently gave out he stumbled forward they helped him to the bed and again 

noted that he was dysarthric and his tongue appeared to be swollen.  There were 

no rash issues reported he was confused but there was no loss of consciousness 

and no reported pallor.  He had been feeling well up until that point.


Subjective/Events-last exam


Patient about the same


BiPAP seems to have pushed secretions in so will just provide Vapotherm all 

night instead of BiPAP tonight


Discussed with wife his DPOA in-depth regarding his status and prognosis and 

made him DNR


Hospice may be closer than we thought





Review of Systems


General:  Fatigue


Pulmonary:  Dyspnea


Neurological:  Weakness





Objective


Exam


Vital Signs





Vital Signs








  Date Time  Temp Pulse Resp B/P (MAP) Pulse Ox O2 Delivery O2 Flow Rate FiO2


 


7/16/22 20:32 36.0 97 26 182/102 (128) 94 Vapotherm 30.00 





       50.00 


 


7/16/22 19:20        50





Capillary Refill : Less Than 3 SecondsLess Than 3 Seconds


General Appearance:  WD/WN, Chronically ill, Mild Distress, Obese


Respiratory:  Accessory Muscle Use, Decreased Breath Sounds


Cardiovascular:  Regular Rate, Rhythm


Neurologic/Psychiatric:  Alert, Oriented x3, Aphasia, Depressed Affect, Motor 

Weakness





Results/Procedures


Lab


Laboratory Tests


7/16/22 05:33








Patient resulted labs reviewed.





Assessment/Plan


Assessment and Plan


Assess & Plan/Chief Complaint


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


Acute respiratory distress requiring supplemental oxygen via Vapotherm


Angioedema of tongue


HTN


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


   -Carotid ultrasound unremarkable


   -Cardiology consulted, appreciate their recommendations


   -CT head showed atrophy and chronic ischemic microvascular changes with lunar

infarcts in Left basal ganglia


   -CTA showed no large vessel occlusion


   -MRI recommended, but couldn't be done d/t body habitus


   -Speech therapy, Physical therapy and Occupational therapy


   -PEG tube placed


Acute respiratory distress requiring supplemental oxygen


   -Vapotherm 35L at 70% O2


Angioedema of tongue


   -Hold lisinopril


   -Speech therapy advised NPO


   -Improving


HTN


   -Improving


Diabetes


   -SSI





Goals: Decrease oxygen requirement and stabilize but may need NHP versus hospice





Diet-NPO, PEG tube


DVT prophylaxis- SCDs and Lovenox











MARCO A HERNADEZ DO                Jul 16, 2022 12:42

## 2022-07-17 VITALS — DIASTOLIC BLOOD PRESSURE: 90 MMHG | SYSTOLIC BLOOD PRESSURE: 160 MMHG

## 2022-07-17 VITALS — SYSTOLIC BLOOD PRESSURE: 178 MMHG | DIASTOLIC BLOOD PRESSURE: 89 MMHG

## 2022-07-17 VITALS — SYSTOLIC BLOOD PRESSURE: 113 MMHG | DIASTOLIC BLOOD PRESSURE: 61 MMHG

## 2022-07-17 VITALS — SYSTOLIC BLOOD PRESSURE: 153 MMHG | DIASTOLIC BLOOD PRESSURE: 98 MMHG

## 2022-07-17 VITALS — SYSTOLIC BLOOD PRESSURE: 141 MMHG | DIASTOLIC BLOOD PRESSURE: 72 MMHG

## 2022-07-17 VITALS — SYSTOLIC BLOOD PRESSURE: 155 MMHG | DIASTOLIC BLOOD PRESSURE: 83 MMHG

## 2022-07-17 VITALS — SYSTOLIC BLOOD PRESSURE: 166 MMHG | DIASTOLIC BLOOD PRESSURE: 77 MMHG

## 2022-07-17 LAB
ALBUMIN SERPL-MCNC: 3.4 GM/DL (ref 3.2–4.5)
ALP SERPL-CCNC: 67 U/L (ref 40–136)
ALT SERPL-CCNC: 8 U/L (ref 0–55)
BASOPHILS # BLD AUTO: 0.1 10^3/UL (ref 0–0.1)
BASOPHILS NFR BLD AUTO: 1 % (ref 0–10)
BILIRUB SERPL-MCNC: 0.8 MG/DL (ref 0.1–1)
BUN/CREAT SERPL: 16
CALCIUM SERPL-MCNC: 9 MG/DL (ref 8.5–10.1)
CHLORIDE SERPL-SCNC: 106 MMOL/L (ref 98–107)
CO2 SERPL-SCNC: 19 MMOL/L (ref 21–32)
CREAT SERPL-MCNC: 0.68 MG/DL (ref 0.6–1.3)
EOSINOPHIL # BLD AUTO: 0.4 10^3/UL (ref 0–0.3)
EOSINOPHIL NFR BLD AUTO: 4 % (ref 0–10)
GFR SERPLBLD BASED ON 1.73 SQ M-ARVRAT: 112 ML/MIN
GLUCOSE SERPL-MCNC: 95 MG/DL (ref 70–105)
HCT VFR BLD CALC: 41 % (ref 40–54)
HGB BLD-MCNC: 13.2 G/DL (ref 13.3–17.7)
LYMPHOCYTES # BLD AUTO: 1.2 10^3/UL (ref 1–4)
LYMPHOCYTES NFR BLD AUTO: 13 % (ref 12–44)
MANUAL DIFFERENTIAL PERFORMED BLD QL: NO
MCH RBC QN AUTO: 28 PG (ref 25–34)
MCHC RBC AUTO-ENTMCNC: 32 G/DL (ref 32–36)
MCV RBC AUTO: 86 FL (ref 80–99)
MONOCYTES # BLD AUTO: 1 10^3/UL (ref 0–1)
MONOCYTES NFR BLD AUTO: 11 % (ref 0–12)
NEUTROPHILS # BLD AUTO: 6.7 10^3/UL (ref 1.8–7.8)
NEUTROPHILS NFR BLD AUTO: 72 % (ref 42–75)
PLATELET # BLD: 360 10^3/UL (ref 130–400)
PMV BLD AUTO: 10.1 FL (ref 9–12.2)
POTASSIUM SERPL-SCNC: 3.4 MMOL/L (ref 3.6–5)
PROT SERPL-MCNC: 6.9 GM/DL (ref 6.4–8.2)
SODIUM SERPL-SCNC: 141 MMOL/L (ref 135–145)
WBC # BLD AUTO: 9.4 10^3/UL (ref 4.3–11)

## 2022-07-17 RX ADMIN — MICONAZOLE NITRATE SCH GM: 20 POWDER TOPICAL at 22:35

## 2022-07-17 RX ADMIN — CLOPIDOGREL BISULFATE SCH MG: 75 TABLET, FILM COATED ORAL at 09:39

## 2022-07-17 RX ADMIN — FAMOTIDINE SCH MG: 20 TABLET, FILM COATED ORAL at 22:34

## 2022-07-17 RX ADMIN — MICONAZOLE NITRATE SCH GM: 20 POWDER TOPICAL at 09:57

## 2022-07-17 RX ADMIN — HYDROCODONE BITARTRATE AND ACETAMINOPHEN PRN EA: 5; 325 TABLET ORAL at 09:39

## 2022-07-17 RX ADMIN — INSULIN ASPART SCH UNIT: 100 INJECTION, SOLUTION INTRAVENOUS; SUBCUTANEOUS at 06:09

## 2022-07-17 RX ADMIN — EMPAGLIFLOZIN SCH MG: 10 TABLET, FILM COATED ORAL at 22:34

## 2022-07-17 RX ADMIN — FAMOTIDINE SCH MG: 20 TABLET, FILM COATED ORAL at 09:40

## 2022-07-17 RX ADMIN — HYDROCODONE BITARTRATE AND ACETAMINOPHEN PRN EA: 5; 325 TABLET ORAL at 00:53

## 2022-07-17 RX ADMIN — NYSTATIN SCH GM: 100000 CREAM TOPICAL at 09:57

## 2022-07-17 RX ADMIN — INSULIN ASPART SCH UNIT: 100 INJECTION, SOLUTION INTRAVENOUS; SUBCUTANEOUS at 12:00

## 2022-07-17 RX ADMIN — NYSTATIN SCH GM: 100000 CREAM TOPICAL at 13:06

## 2022-07-17 RX ADMIN — METOPROLOL TARTRATE SCH MG: 50 TABLET, FILM COATED ORAL at 09:39

## 2022-07-17 RX ADMIN — ASPIRIN SCH MG: 81 TABLET ORAL at 09:39

## 2022-07-17 RX ADMIN — METOPROLOL TARTRATE SCH MG: 50 TABLET, FILM COATED ORAL at 22:35

## 2022-07-17 RX ADMIN — ENOXAPARIN SODIUM SCH MG: 100 INJECTION SUBCUTANEOUS at 00:29

## 2022-07-17 RX ADMIN — INSULIN ASPART SCH UNIT: 100 INJECTION, SOLUTION INTRAVENOUS; SUBCUTANEOUS at 00:29

## 2022-07-17 RX ADMIN — INSULIN ASPART SCH UNIT: 100 INJECTION, SOLUTION INTRAVENOUS; SUBCUTANEOUS at 17:24

## 2022-07-17 RX ADMIN — AMLODIPINE BESYLATE SCH MG: 5 TABLET ORAL at 09:38

## 2022-07-17 RX ADMIN — NYSTATIN SCH GM: 100000 CREAM TOPICAL at 22:35

## 2022-07-17 RX ADMIN — ENOXAPARIN SODIUM SCH MG: 100 INJECTION SUBCUTANEOUS at 12:30

## 2022-07-17 NOTE — PROGRESS NOTE - CARDIOLOGY
Cardiology SOAP Progress Note


Subjective:


Communication difficult due to aphasia


Able to communicate through gesture


Does not indicated cp or palp or syncope or shortness of breath





Objective:


I&O/Vital Signs











 7/17/22 7/17/22 7/17/22 7/17/22





 03:53 03:55 04:30 04:31


 


Temp    35.9


 


Pulse    85


 


Resp    20


 


B/P (MAP)    113/61 (78)


 


Pulse Ox 95   93


 


O2 Delivery Vapotherm Vapotherm Vapotherm Vapotherm


 


O2 Flow Rate 30.00 20.00 30.00 20.00





    40.00


 


FiO2 50 40 50 


 


    





 7/17/22 7/17/22 7/17/22 7/17/22





 07:00 07:53 08:00 08:00


 


Temp   37.3 


 


Pulse 105  96 


 


Resp   20 


 


B/P (MAP)   160/90 (113) 


 


Pulse Ox  92 95 


 


O2 Delivery  Vapotherm  Vapotherm


 


O2 Flow Rate  20.00  20.00


 


FiO2  40  40


 


    





 7/17/22 7/17/22 7/17/22 7/17/22





 10:30 11:29 12:00 13:01


 


Temp  36.0  


 


Pulse  96  


 


Resp  16  


 


B/P (MAP)  153/98 (116)  


 


Pulse Ox 93 92  95


 


O2 Delivery Vapotherm  Vapotherm Vapotherm


 


O2 Flow Rate 20.00  20.00 25.00


 


FiO2 40  40 45


 


    





 7/17/22   





 13:15   


 


Temp 36.0   


 


Pulse 100   


 


Resp 18   


 


B/P (MAP) 178/89 (118)   


 


Pulse Ox 93   


 


O2 Delivery Vapotherm   


 


O2 Flow Rate 25.00   





 45.00   














 7/17/22





 00:00


 


Intake Total 1050 ml


 


Output Total 1800 ml


 


Balance -750 ml








Constitutional:  well-developed, other (non-verbal - nods head slowly in 

response to questions)


Respiratory:  No accessory muscle use, No respiratory distress; chest expansion 

is symmetric, chest is bilaterally symmetric, other (coarse breath sounds with 

productive cough)


Cardiovascular:  regular rate-rhythm; No JVD; tachycardia, S1 and S2


Gastrointestional:  No tender; soft, round, audible bowel sounds, other (PEG 

tube in place)


Extremities:  no lower extremity edema bilateral


Neurologic/Psychiatric:  other (RUE and RLE flaccid, facial droop right sided; 

LLE and LUE chronically weak 4/5; aphasic)


Skin:  No rash on exposed areas, No ulcerations on exposed areas





Results/Procedures:


Labs


Laboratory Tests


7/16/22 17:43: Glucometer 88


7/17/22 00:26: Glucometer 95


7/17/22 06:00: Glucometer 97


7/17/22 06:54: 


White Blood Count 9.4, Red Blood Count 4.78, Hemoglobin 13.2L, Hematocrit 41, 

Mean Corpuscular Volume 86, Mean Corpuscular Hemoglobin 28, Mean Corpuscular 

Hemoglobin Concent 32, Red Cell Distribution Width 13.2, Platelet Count 360, 

Mean Platelet Volume 10.1, Immature Granulocyte % (Auto) 0, Neutrophils (%) 

(Auto) 72, Lymphocytes (%) (Auto) 13, Monocytes (%) (Auto) 11, Eosinophils (%) 

(Auto) 4, Basophils (%) (Auto) 1, Neutrophils # (Auto) 6.7, Lymphocytes # (Auto)

1.2, Monocytes # (Auto) 1.0, Eosinophils # (Auto) 0.4H, Basophils # (Auto) 0.1, 

Immature Granulocyte # (Auto) 0.0, Sodium Level 141, Potassium Level 3.4L, 

Chloride Level 106, Carbon Dioxide Level 19L, Anion Gap 16H, Blood Urea Nitrogen

11, Creatinine 0.68, Estimat Glomerular Filtration Rate 112, BUN/Creatinine 

Ratio 16, Glucose Level 95, Calcium Level 9.0, Corrected Calcium 9.5, Total Ahsan

irubin 0.8, Aspartate Amino Transf (AST/SGOT) 11, Alanine Aminotransferase 

(ALT/SGPT) 8, Alkaline Phosphatase 67, Total Protein 6.9, Albumin 3.4


7/17/22 11:50: Glucometer 128H





Microbiology


7/12/22 MRSA Screen - Final, Complete


          MRSA not isolated








A/P:


Assessment:





CVA with right sided hemiparesis, aphasia, dysphagia


- reported h/o several CVA/TIA's following COVID infection in 2021 with left 

sided weakness - presents this time with righ sided 

hemiparesis/aphasia/dysphagia


- The stroke team at  was consulted with no further recommendations other than

continuing aspirin and Plavix and obtaining an MRI


- Management per stroke team


- Follows with Dr. Ramirez of neurology services at [facility]


- Carotid u/s7-11-22:  Very limited exam. The right carotid artery shows no 

hemodynamic disease with minimal plaquing.  Left carotid system and vertebral 

arteries were not adequately evaluated due to patient's pain and position. 

Previous CT angiography of the neck on 07/09/2022 suggested


the carotid and vertebral arteries to be widely patent without significant 

stenosis


- ILR implanted on 7/12/22 to eval for occult A Fib as the source of his CVA





?Angioedema


- Lisinopril stopped





Fall at home





Sleep apnea


- non-complaint with sleep study





? aspiration


- S/P PEG tube placement on 7-14-22 by Dr. Olmedo





HTN


- currently controlled





Crohn's dz


- h/o colectomy





DM 2


Plan:





CVA


- management per stroke team - please see recs per [facility] as above


- source undetermined - implanted ILR 7-12-22 to r/o possible a-fib/flutter as 

cause. No significant arrhythmia seen so far 


S/P PEG tube placement - change IV meds to oral


Monitor lab from time to time and replenish electrolytes











VICKY ROBERTS MD FACP [facility] CCD[facility]   Jul 17, 2022 15:01

## 2022-07-17 NOTE — PROGRESS NOTE - HOSPITALIST
Subjective


HPI/CC On Admission


Date Seen by Provider:  Jul 17, 2022


Time Seen by Provider:  11:00


Patient is a 50yo male, h/o COVID about 1.5y ago and multiple neurologic insults

during hospitalization for Covid at that time (TIA vs strokes).  He has had 

chronic debility since then with several ED visits for weakness and strokelike s

ymptoms.  Apparently his sons were at home with him and he had a fall in the 

bathroom with increased weakness to the left side.  Unsure of LOC as sons are 

not here at this time for history.  The patient (with significantly slurred 

speech) is able to tell me that his wife is working in [place] - which is 

consistent with prior visits to our ER.  He does state he has a mild headache.  

Otherwise his speech is so slurred he is very difficult to understand.


Upon my arrival patient exhibited right neglect tongue was sticking out mildly 

swollen without erythema.  The patient is eyes were open and did orient to voice

he was slow to respond and very dysarthric unintelligible speech.  He was able 

to follow simple commands but when asked to use his right hand to touch my 

finger he only moved his left hand and was able to do so with good control of 

the left hand.  His wife came later in the day and reports that he had been 

seeing neurologist Dr. Loja had a rather extensive work-up was started on a

spirin and Plavix but no apparent large vessel disease was noted.  He had an 

echocardiogram gram and there is been no evidence for atrial fibrillation.  He 

was having no neurologic issues previous to COVID infection in January 2021.  He

had been doing well at home until the night of his admission when his right leg 

apparently gave out he stumbled forward they helped him to the bed and again 

noted that he was dysarthric and his tongue appeared to be swollen.  There were 

no rash issues reported he was confused but there was no loss of consciousness 

and no reported pallor.  He had been feeling well up until that point.


Subjective/Events-last exam


Doing about the same


Moving to 4th floor


Poor prognosis


No recovery noted


Cant move and can't express himself


DNR





Review of Systems


Neurological:  Weakness, Change in speech





Objective


Exam


Vital Signs





Vital Signs








  Date Time  Temp Pulse Resp B/P (MAP) Pulse Ox O2 Delivery O2 Flow Rate FiO2


 


7/17/22 16:06 36.0     Vapotherm 25.00 





       45.00 


 


7/17/22 15:33  92 20  95   


 


7/17/22 13:30        40





Capillary Refill : Less Than 3 SecondsLess Than 3 Seconds


General Appearance:  No Apparent Distress, WD/WN, Chronically ill, Obese


Respiratory:  No Accessory Muscle Use, No Respiratory Distress, Decreased Breath

Sounds


Cardiovascular:  Regular Rate, Rhythm


Neurologic/Psychiatric:  Alert, Aphasia, Depressed Affect, Motor Weakness





Results/Procedures


Lab


Laboratory Tests


7/17/22 06:54








Patient resulted labs reviewed.





Assessment/Plan


Assessment and Plan


Assess & Plan/Chief Complaint


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


Acute respiratory distress requiring supplemental oxygen via Vapotherm


Angioedema of tongue


HTN


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


   -Carotid ultrasound unremarkable


   -Cardiology consulted, appreciate their recommendations


   -CT head showed atrophy and chronic ischemic microvascular changes with lunar

infarcts in Left basal ganglia


   -CTA showed no large vessel occlusion


   -MRI recommended, but couldn't be done d/t body habitus


   -Speech therapy, Physical therapy and Occupational therapy


   -PEG tube placed


Acute respiratory distress requiring supplemental oxygen


   -Vapotherm 20L at 40% O2


Angioedema of tongue


   -Hold lisinopril


   -Speech therapy advised NPO


   -Improving


HTN


   -Improving


Diabetes


   -SSI





Goals: Decrease oxygen requirement and stabilize but may need NHP versus hospice





Diet-NPO, PEG tube


DVT prophylaxis- SCDs and Lovenox





Move to 4th floor











MARCO A HERNADEZ DO                Jul 17, 2022 06:42

## 2022-07-18 VITALS — DIASTOLIC BLOOD PRESSURE: 82 MMHG | SYSTOLIC BLOOD PRESSURE: 136 MMHG

## 2022-07-18 VITALS — SYSTOLIC BLOOD PRESSURE: 178 MMHG | DIASTOLIC BLOOD PRESSURE: 82 MMHG

## 2022-07-18 VITALS — DIASTOLIC BLOOD PRESSURE: 84 MMHG | SYSTOLIC BLOOD PRESSURE: 180 MMHG

## 2022-07-18 VITALS — SYSTOLIC BLOOD PRESSURE: 136 MMHG | DIASTOLIC BLOOD PRESSURE: 79 MMHG

## 2022-07-18 VITALS — DIASTOLIC BLOOD PRESSURE: 81 MMHG | SYSTOLIC BLOOD PRESSURE: 134 MMHG

## 2022-07-18 VITALS — SYSTOLIC BLOOD PRESSURE: 164 MMHG | DIASTOLIC BLOOD PRESSURE: 83 MMHG

## 2022-07-18 VITALS — SYSTOLIC BLOOD PRESSURE: 176 MMHG | DIASTOLIC BLOOD PRESSURE: 81 MMHG

## 2022-07-18 VITALS — SYSTOLIC BLOOD PRESSURE: 156 MMHG | DIASTOLIC BLOOD PRESSURE: 90 MMHG

## 2022-07-18 LAB
ALBUMIN SERPL-MCNC: 3.5 GM/DL (ref 3.2–4.5)
ALP SERPL-CCNC: 74 U/L (ref 40–136)
ALT SERPL-CCNC: 10 U/L (ref 0–55)
BASOPHILS # BLD AUTO: 0.1 10^3/UL (ref 0–0.1)
BASOPHILS NFR BLD AUTO: 1 % (ref 0–10)
BILIRUB SERPL-MCNC: 0.7 MG/DL (ref 0.1–1)
BUN/CREAT SERPL: 19
CALCIUM SERPL-MCNC: 9.4 MG/DL (ref 8.5–10.1)
CHLORIDE SERPL-SCNC: 104 MMOL/L (ref 98–107)
CO2 SERPL-SCNC: 23 MMOL/L (ref 21–32)
CREAT SERPL-MCNC: 0.73 MG/DL (ref 0.6–1.3)
EOSINOPHIL # BLD AUTO: 0.4 10^3/UL (ref 0–0.3)
EOSINOPHIL NFR BLD AUTO: 4 % (ref 0–10)
GFR SERPLBLD BASED ON 1.73 SQ M-ARVRAT: 109 ML/MIN
GLUCOSE SERPL-MCNC: 119 MG/DL (ref 70–105)
HCT VFR BLD CALC: 41 % (ref 40–54)
HGB BLD-MCNC: 13.4 G/DL (ref 13.3–17.7)
LYMPHOCYTES # BLD AUTO: 1.5 10^3/UL (ref 1–4)
LYMPHOCYTES NFR BLD AUTO: 15 % (ref 12–44)
MANUAL DIFFERENTIAL PERFORMED BLD QL: NO
MCH RBC QN AUTO: 28 PG (ref 25–34)
MCHC RBC AUTO-ENTMCNC: 33 G/DL (ref 32–36)
MCV RBC AUTO: 85 FL (ref 80–99)
MONOCYTES # BLD AUTO: 1 10^3/UL (ref 0–1)
MONOCYTES NFR BLD AUTO: 10 % (ref 0–12)
NEUTROPHILS # BLD AUTO: 7.4 10^3/UL (ref 1.8–7.8)
NEUTROPHILS NFR BLD AUTO: 70 % (ref 42–75)
PLATELET # BLD: 406 10^3/UL (ref 130–400)
PMV BLD AUTO: 10 FL (ref 9–12.2)
POTASSIUM SERPL-SCNC: 3.4 MMOL/L (ref 3.6–5)
PROT SERPL-MCNC: 7.2 GM/DL (ref 6.4–8.2)
SODIUM SERPL-SCNC: 144 MMOL/L (ref 135–145)
WBC # BLD AUTO: 10.4 10^3/UL (ref 4.3–11)

## 2022-07-18 RX ADMIN — METOPROLOL TARTRATE SCH MG: 50 TABLET, FILM COATED ORAL at 19:37

## 2022-07-18 RX ADMIN — INSULIN ASPART SCH UNIT: 100 INJECTION, SOLUTION INTRAVENOUS; SUBCUTANEOUS at 00:21

## 2022-07-18 RX ADMIN — NYSTATIN SCH GM: 100000 CREAM TOPICAL at 12:20

## 2022-07-18 RX ADMIN — FAMOTIDINE SCH MG: 20 TABLET, FILM COATED ORAL at 19:37

## 2022-07-18 RX ADMIN — INSULIN ASPART SCH UNIT: 100 INJECTION, SOLUTION INTRAVENOUS; SUBCUTANEOUS at 23:14

## 2022-07-18 RX ADMIN — INSULIN ASPART SCH UNIT: 100 INJECTION, SOLUTION INTRAVENOUS; SUBCUTANEOUS at 12:22

## 2022-07-18 RX ADMIN — ACETAMINOPHEN PRN MG: 325 TABLET ORAL at 19:37

## 2022-07-18 RX ADMIN — EMPAGLIFLOZIN SCH MG: 10 TABLET, FILM COATED ORAL at 19:37

## 2022-07-18 RX ADMIN — FAMOTIDINE SCH MG: 20 TABLET, FILM COATED ORAL at 09:14

## 2022-07-18 RX ADMIN — MICONAZOLE NITRATE SCH APPLIC: 20 POWDER TOPICAL at 19:38

## 2022-07-18 RX ADMIN — AMLODIPINE BESYLATE SCH MG: 5 TABLET ORAL at 09:15

## 2022-07-18 RX ADMIN — MICONAZOLE NITRATE SCH GM: 20 POWDER TOPICAL at 09:16

## 2022-07-18 RX ADMIN — INSULIN ASPART SCH UNIT: 100 INJECTION, SOLUTION INTRAVENOUS; SUBCUTANEOUS at 05:15

## 2022-07-18 RX ADMIN — CLOPIDOGREL BISULFATE SCH MG: 75 TABLET, FILM COATED ORAL at 09:22

## 2022-07-18 RX ADMIN — ASPIRIN SCH MG: 81 TABLET ORAL at 09:14

## 2022-07-18 RX ADMIN — NYSTATIN SCH GM: 100000 CREAM TOPICAL at 09:17

## 2022-07-18 RX ADMIN — NYSTATIN SCH GM: 100000 CREAM TOPICAL at 19:38

## 2022-07-18 RX ADMIN — INSULIN ASPART SCH UNIT: 100 INJECTION, SOLUTION INTRAVENOUS; SUBCUTANEOUS at 17:10

## 2022-07-18 RX ADMIN — ENOXAPARIN SODIUM SCH MG: 100 INJECTION SUBCUTANEOUS at 00:26

## 2022-07-18 RX ADMIN — ENOXAPARIN SODIUM SCH MG: 100 INJECTION SUBCUTANEOUS at 12:19

## 2022-07-18 RX ADMIN — METOPROLOL TARTRATE SCH MG: 50 TABLET, FILM COATED ORAL at 09:15

## 2022-07-18 NOTE — PHYSICAL THERAPY DAILY NOTE
PT Daily Note-Current


Subjective


Patient in bed pre tx, agrees to PT, has unrated pain in left shoulder per wife.





Appearance


Patient in bed post tx with nurse call, phone, tray, arms supported on pillows, 

heel float bilaterally on pillow





Mental Status


Patient Orientation:  Person, Unable to Assess, Non-Verbal/Aphasic


Attachments:  SCD's, Oxygen, PEG Tube, IV





Transfers


SCALE: Activities may be completed with or without assistive devices.





6-Indepedent-patient completes the activity by him/herself with no assistance 

from a helper.


5-Set-up or Clean-up Assistance-helper sets up or cleans up; patient completes 

activity. [place] assists only prior to or  


    following the activity.


4-Supervision or Touching Assistance-helper provides verbal cues and/or 

touching/steadying and/or contact guard assistance as patient completes 

activity. Assistance may be provided   


    throughout the activity or intermittently.


3-Partial/Moderate Assistance-helper does LESS THAN HALF the effort. [place] 

lifts, holds or supports trunk or limbs, but provides less than half the effort.


2-Substantial/Maximal Assistance-helper does MORE THAN HALF the effort. [place] 

lifts or holds trunk or limbs and provides more than half the effort.


8-Bluvllxav-ejmkyv does ALL the effort. Patient does none of the effort to 

complete the activity. Or, the assistance of 2 or more helpers is required for 

the patient to complete the  


    activity.


If activity was not attempted, code reason:


7-Patient Refused.


9-Not Applicable-not attempted and the patient did not perform the activity 

before the current illness, exacerbation or injury.


10-Not Attempted due to Environmental Limitations-(lack of equipment, weather 

restraints, etc.).


88-Not Attempted due to Medical Conditions or Safety Concerns.


Roll Left & Right (QC):  1


Sit to Lying (QC):  1


Lying to Sitting/Side of Bed(Q:  1


Sit to Stand (QC):  1


Patient required assist of 3 for supine to sit, needs assist to maintain sitting

balance, worked on sitting balance and head positioning, his head still leans to

the left side.  Attempted to stand x2, dependent, was able to clear patient's 

bottom from bed for a few inches is all, a new pad and drawsheet were able to be

put beneath him.  Patient layed back down with assist of 3 and positioned 

comfortably.





Treatments


bed mobility, sitting, attempted standing





Assessment


Current Status:  Poor Progress


no change in mobility





PT Long Term Goals


Long Term Goals


PT Long Term Goals Time Frame:  Jul 18, 2022


Roll Left & Right (QC):  3


Sit to Lying (QC):  3


Lying-Sitting on Side/Bed(QC):  3


Sit to Stand (QC):  3


Chair/Bed-to-Chair Xfer(QC):  2





PT Plan


Problem List


Problem List:  Activity Tolerance, Functional Strength, Safety, Balance, Gait, 

Transfer, Bed Mobility, ROM





Treatment/Plan


Treatment Plan:  Continue Plan of Care


Treatment Plan:  Bed Mobility, Education, Functional Activity Promise, Functional 

Strength, Gait, Safety, Therapeutic Exercise, Transfers


Treatment Duration:  Jul 18, 2022


Frequency:  6 times per week


Estimated Hrs Per Day:  .25 hour per day


Patient and/or Family Agrees t:  Yes





Safety Risks/Education


Patient Education:  Correct Positioning, Safety Issues


Teaching Recipient:  Patient


Teaching Methods:  Demonstration, Discussion


Response to Teaching:  Reinforcement Needed





Time/GCodes


Time In:  0905


Time Out:  0920


Total Billed Treatment Time:  15


Total Billed Treatment


1 visit


FA 15'











KRTEK,ALYSSIA PT                Jul 18, 2022 09:26

## 2022-07-18 NOTE — OCCUPATIONAL THER DAILY NOTE
OT Current Status-Daily Note


Subjective


Pt alert, lying in bed.  Pt is answering questions with thumbs up (yes) thumbs 

down (no) consistently.  Wife and son are present in room.  Wife states that 

pt's R shldr is hurting, pt agrees with thumbs up.  After session, pt positioned

for comfort.





Mental Status/Objective


Patient Orientation:  Person, Place, Non-Verbal/Aphasic, Time, Situation


Attachments:  [place] Catheter, IV, Oxygen, PEG Tube, SCD's





ADL-Treatment


Pt able to hold on suction with L hand to place in mouth when excess saliva 

needs to be evacuated from mouth.  Dependent for ADLs.


Therapy Code Descriptions/Definitions 





Functional [place] Measure:


0=Not Assessed/NA        4=Minimal Assistance


1=Total Assistance        5=Supervision or Setup


2=Maximal Assistance  6=Modified [place]


3=Moderate Assistance 7=Complete IndependenceSCALE: Activities may be completed 

with or without assistive devices.





6-Indepedent-patient completes the activity by him/herself with no assistance 

from a helper.


5-Set-up or Clean-up Assistance-helper sets up or cleans up; patient completes 

activity. [place] assists only prior to or  


    following the activity.


4-Supervision or Touching Assistance-helper provides verbal cues and/or 

touching/steadying and/or contact guard assistance as patient completes 

activity. Assistance may be provided   


    throughout the activity or intermittently.


3-Partial/Moderate Assistance-helper does LESS THAN HALF the effort. [place] li

fts, holds or supports trunk or limbs, but provides less than half the effort.


2-Substantial/Maximal Assistance-helper does MORE THAN HALF the effort. [place] 

lifts or holds trunk or limbs and provides more than half the effort.


9-Tghxlcvfo-zdaoye does ALL the effort. Patient does none of the effort to 

complete the activity. Or, the assistance of 2 or more helpers is required for 

the patient to complete the  


    activity.


If activity was not attempted, code reason:


7-Patient Refused.


9-Not Applicable-not attempted and the patient did not perform the activity 

before the current illness, exacerbation or injury.


10-Not Attempted due to Environmental Limitations-(lack of equipment, weather 

restraints, etc.).


88-Not Attempted due to Medical Conditions or Safety Concerns.





Other Treatment


Assist x3 for supine to sit, assist to maintain sitting balance, worked on 

sitting balance and head positioning, head leans to left side.  Massage and 

light stretch to R side of neck to allow positioning of head closer to midline. 

Assist x2 to stand, was able to clear patient's bottom from bed for a few inches

is all, a new pad and drawsheet were able to be put beneath him.  Patient layed 

back down with assist of 3 and positioned comfortably.  Nrsg and family in room 

after session.  Call light within reach.  All needs met.





OT Long Term Goals


Long Term Goals


Time Frame:  Aug 12, 2022


Eating (QC):  3


Toileting Hygiene (QC):  3


Shower/Bathe Self (QC):  2


Upper Body Dressing (QC):  3


Lower Body Dressing (QC):  2


On/Off Footwear (QC):  2


Additional Goals:  1-Demonstrate ADL Tasks, 2-Verbalize Understanding, 3-

ImproveStrength/Promise


1=Demonstrate adherence to instructed precautions during ADL tasks.


2=Patient will verbalize/demonstrate understanding of assistive 

devices/modifications for ADL.


3=Patient will improve strength/tolerance for activity to enable patient to 

perform ADL's.





OT Education/Plan


Problem List/Assessment


Assessment:  Decreased Activ Tolerance, Decreased UE Strength, Dependent 

Transfers, Impaired Bed Mobility, Impaired Coordination, Impaired Funct Balance,

Impaired I ADL's, Impaired Self-Care Skills, Restricted Funct UE ROM, Visual-

Perceptual Deficit


Pt would benefit from skilled OT services in order to increase functional use of

RUE, improve independence and safety with ADLs and functional mobility, and for 

neuromuscular reeducation in order to maximize LOF for safe return home with 

spouse.





Discharge Recommendations


Plan/Recommendations:  Continue POC





Treatment Plan/Plan of Care


Patient would benefit from OT for education, treatment and training to promote 

independence in ADL's, mobility, safety and/or upper extremity function for 

ADL's.


Plan of Care:  ADL Retraining, Functional Mobility, UE Funct Exercise/Act, UE 

Neuromus Re-Ed/Coord


Treatment Duration:  Aug 12, 2022


Frequency:  5 times per week


Estimated Hrs Per Day:  .25 hour per day


Rehab Potential:  Poor





Time/GCodes


Start Time:  09:05


Stop Time:  09:20


Total Time Billed (hr/min):  15


Billed Treatment Time


1 visit-FA 1 (15 min)











NACHO SIMMS               Jul 18, 2022 09:49

## 2022-07-18 NOTE — PROGRESS NOTE - CARDIOLOGY
Cardiology SOAP Progress Note


Subjective:


Family at the bedside


He nods no regarding questions about CP or SOB





Objective:


I&O/Vital Signs











 7/18/22 7/18/22 7/19/22 7/19/22





 23:37 23:37 03:43 07:48


 


Temp 36.1  36.4 36.1


 


Pulse 88  104 109


 


Resp 16  18 24


 


B/P (MAP) 134/81 (98)  155/91 (112) 157/93 (114)


 


Pulse Ox 96 96 97 95


 


O2 Delivery High Flow N/C Nasal Cannula High Flow N/C High Flow N/C


 


O2 Flow Rate 8.00 8.00 8.00 8.00














 7/19/22





 00:00


 


Intake Total 1260 ml


 


Output Total 1475 ml


 


Balance -215 ml








Constitutional:  well-developed, other (non-verbal - nods head slowly in 

response to questions)


Respiratory:  No accessory muscle use, No respiratory distress; chest expansion 

is symmetric, chest is bilaterally symmetric, other (coarse breath sounds with 

productive cough)


Cardiovascular:  regular rate-rhythm; No JVD; tachycardia, S1 and S2


Gastrointestional:  No tender; soft, round, audible bowel sounds, other (PEG 

tube in place)


Extremities:  no lower extremity edema bilateral


Neurologic/Psychiatric:  other (RUE and RLE flaccid, facial droop right sided; 

LLE and LUE chronically weak 4/5; aphasic)


Skin:  No rash on exposed areas, No ulcerations on exposed areas





Results/Procedures:


Labs


Laboratory Tests


7/18/22 11:08: Glucometer 129H


7/18/22 23:09: Glucometer 112H


7/19/22 05:13: Glucometer 104


7/19/22 06:13: 


White Blood Count 9.7, Red Blood Count 4.86, Hemoglobin 13.2L, Hematocrit 41, 

Mean Corpuscular Volume 85, Mean Corpuscular Hemoglobin 27, Mean Corpuscular Hem

oglobin Concent 32, Red Cell Distribution Width 13.3, Platelet Count 392, Mean 

Platelet Volume 9.9, Immature Granulocyte % (Auto) 0, Neutrophils (%) (Auto) 69,

Lymphocytes (%) (Auto) 15, Monocytes (%) (Auto) 10, Eosinophils (%) (Auto) 4, 

Basophils (%) (Auto) 1, Neutrophils # (Auto) 6.7, Lymphocytes # (Auto) 1.5, 

Monocytes # (Auto) 1.0, Eosinophils # (Auto) 0.4H, Basophils # (Auto) 0.1, 

Immature Granulocyte # (Auto) 0.0, Sodium Level 146H, Potassium Level 3.4L, 

Chloride Level 107, Carbon Dioxide Level 24, Anion Gap 15H, Blood Urea Nitrogen 

16, Creatinine 0.66, Estimat Glomerular Filtration Rate 113, BUN/Creatinine 

Ratio 24, Glucose Level 114H, Calcium Level 9.2, Corrected Calcium 9.7, Total 

Bilirubin 0.6, Aspartate Amino Transf (AST/SGOT) 11, Alanine Aminotransferase 

(ALT/SGPT) 10, Alkaline Phosphatase 67, Total Protein 7.0, Albumin 3.4





Microbiology


7/12/22 MRSA Screen - Final, Complete


          MRSA not isolated








A/P:


Assessment:





CVA with right sided hemiparesis, aphasia, dysphagia


- reported h/o several CVA/TIA's following COVID infection in 2021 with left 

sided weakness - presents this time with righ sided 

hemiparesis/aphasia/dysphagia


- The stroke team at  was consulted with no further recommendations other than

continuing aspirin and Plavix and obtaining an MRI


- Management per stroke team


- Follows with Dr. Ramirez of neurology services at [facility]


- Carotid u/s7-11-22:  Very limited exam. The right carotid artery shows no 

hemodynamic disease with minimal plaquing.  Left carotid system and vertebral 

arteries were not adequately evaluated due to patient's pain and position. 

Previous CT angiography of the neck on 07/09/2022 suggested


the carotid and vertebral arteries to be widely patent without significant 

stenosis


- ILR implanted on 7/12/22 to eval for occult A Fib as the source of his CVA





?Angioedema


- Lisinopril stopped





Fall at home





Sleep apnea


- non-complaint with sleep study





? aspiration


- S/P PEG tube placement on 7-14-22 by Dr. Olmedo





HTN


- currently controlled





Crohn's dz


- h/o colectomy





DM 2


Plan:





CVA


- management per stroke team - please see recs per [facility] as above


- source undetermined - implanted ILR 7-12-22 to r/o possible a-fib/flutter as 

cause. No significant arrhythmia seen so far 


S/P PEG tube placement - continue current medications


Monitor lab from time to time and replenish electrolytes











ROXANNE BRADY           Jul 18, 2022 10:17

## 2022-07-18 NOTE — PROGRESS NOTE - CARDIOLOGY
Cardiology SOAP Progress Note


Subjective:


He does not indicate cp or palp or shortness of breath


Communication difficult because of aphasia





Objective:


I&O/Vital Signs











 7/18/22 7/18/22 7/18/22 7/18/22





 07:52 08:00 10:38 11:06


 


Temp 37.4   36.2


 


Pulse 115   95


 


Resp 20   20


 


B/P (MAP) 164/83 (110)   156/90 (112)


 


Pulse Ox 88  95 96


 


O2 Delivery High Flow N/C Nasal Cannula High Flow N/C High Flow N/C


 


O2 Flow Rate 5.00 8.00 8.00 8.00


 


    





 7/18/22   





 16:04   


 


Temp 35.9   


 


Pulse 103   


 


Resp 20   


 


B/P (MAP) 176/81 (112)   


 


Pulse Ox 96   


 


O2 Delivery High Flow N/C   


 


O2 Flow Rate 8.00   














 7/18/22





 00:00


 


Intake Total 390 ml


 


Output Total 1750 ml


 


Balance -1360 ml








Constitutional:  well-developed, other (non-verbal - nods head slowly in 

response to questions)


Respiratory:  No accessory muscle use, No respiratory distress; chest expansion 

is symmetric, chest is bilaterally symmetric, other (coarse breath sounds with 

productive cough)


Cardiovascular:  regular rate-rhythm; No JVD; tachycardia, S1 and S2


Gastrointestional:  No tender; soft, round, audible bowel sounds, other (PEG 

tube in place)


Extremities:  no lower extremity edema bilateral


Neurologic/Psychiatric:  other (RUE and RLE flaccid, facial droop right sided; 

LLE and LUE chronically weak 4/5; aphasic)


Skin:  No rash on exposed areas, No ulcerations on exposed areas





Results/Procedures:


Labs


Laboratory Tests


7/18/22 00:13: Glucometer 115H


7/18/22 05:13: Glucometer 126H


7/18/22 05:50: 


White Blood Count 10.4, Red Blood Count 4.83, Hemoglobin 13.4, Hematocrit 41, 

Mean Corpuscular Volume 85, Mean Corpuscular Hemoglobin 28, Mean Corpuscular Hem

oglobin Concent 33, Red Cell Distribution Width 13.2, Platelet Count 406H, Mean 

Platelet Volume 10.0, Immature Granulocyte % (Auto) 0, Neutrophils (%) (Auto) 

70, Lymphocytes (%) (Auto) 15, Monocytes (%) (Auto) 10, Eosinophils (%) (Auto) 

4, Basophils (%) (Auto) 1, Neutrophils # (Auto) 7.4, Lymphocytes # (Auto) 1.5, 

Monocytes # (Auto) 1.0, Eosinophils # (Auto) 0.4H, Basophils # (Auto) 0.1, 

Immature Granulocyte # (Auto) 0.0, Sodium Level 144, Potassium Level 3.4L, 

Chloride Level 104, Carbon Dioxide Level 23, Anion Gap 17H, Blood Urea Nitrogen 

14, Creatinine 0.73, Estimat Glomerular Filtration Rate 109, BUN/Creatinine 

Ratio 19, Glucose Level 119H, Calcium Level 9.4, Corrected Calcium 9.8, Total 

Bilirubin 0.7, Aspartate Amino Transf (AST/SGOT) 14, Alanine Aminotransferase 

(ALT/SGPT) 10, Alkaline Phosphatase 74, Total Protein 7.2, Albumin 3.5


7/18/22 11:08: Glucometer 129H





Microbiology


7/12/22 MRSA Screen - Final, Complete


          MRSA not isolated





Laboratory Tests


7/17/22 06:54








7/18/22 05:50











A/P:


Assessment:





CVA with right sided hemiparesis, aphasia, dysphagia


- reported h/o several CVA/TIA's following COVID infection in 2021 with left 

sided weakness - presents this time with righ sided 

hemiparesis/aphasia/dysphagia


- The stroke team at  was consulted with no further recommendations other than

continuing aspirin and Plavix and obtaining an MRI


- Management per stroke team


- Follows with Dr. Ramirez of neurology services at [facility]


- Carotid u/s7-11-22:  Very limited exam. The right carotid artery shows no 

hemodynamic disease with minimal plaquing.  Left carotid system and vertebral 

arteries were not adequately evaluated due to patient's pain and position. 

Previous CT angiography of the neck on 07/09/2022 suggested


the carotid and vertebral arteries to be widely patent without significant 

stenosis


- ILR implanted on 7/12/22 to eval for occult A Fib as the source of his CVA





?Angioedema


- Lisinopril stopped





Fall at home





Sleep apnea


- non-complaint with sleep study





? aspiration


- S/P PEG tube placement on 7-14-22 by Dr. Olmedo





HTN


- currently controlled





Crohn's dz


- h/o colectomy





DM 2


Plan:





CVA


- management per stroke team - please see recs per [facility] as above


- source undetermined - implanted ILR 7-12-22 to r/o possible a-fib/flutter as 

cause. No significant arrhythmia seen so far 


S/P PEG tube placement - continue current medications


Monitor lab from time to time and replenish electrolytes











VICKY ROBERTS MD FACP [facility]   Jul 18, 2022 18:47

## 2022-07-18 NOTE — PROGRESS NOTE
Subjective


Subjective/Events-last exam


Patient awake and alert, Able to use thumbs up and down and use hand gestures 

with left hand, otherwise aphasic. Denies any pain. PEG tube in place.





Spoke with family regarding goals of care and when he is ready for discharge 

they would like to d/c home with HH. They have a large family and feel they will

be able to provide good care at home. They will need hospital bed.


Review of Systems


Pulmonary:  Dyspnea


Cardiovascular:  No: Chest Pain, Palpitations


Gastrointestinal:  No: Nausea, Vomiting


Neurological:  Weakness, Incoordination, Change in speech





Objective


Exam


Last Set of Vital Signs





Vital Signs








  Date Time  Temp Pulse Resp B/P (MAP) Pulse Ox O2 Delivery O2 Flow Rate FiO2


 


22 11:06 36.2 95 20 156/90 (112) 96 High Flow N/C 8.00 


 


22 02:23        45





Capillary Refill : Less Than 3 SecondsLess Than 3 Seconds


I&O











Intake and Output 


 


 22





 00:00


 


Intake Total 690 ml


 


Output Total 2475 ml


 


Balance -1785 ml


 


 


 


Intake Oral 0 ml


 


Tube Feeding 515 ml


 


Other 175 ml


 


Output Urine Total 2475 ml








General:  Alert, No Acute Distress


Lungs:  Other (course breath sounds with basilar wheezing and rhonchi, he is 

able to clear some secreations and uses suction with left hand)


Heart:  Regular Rate, No Murmurs


Abdomen:  Soft, No Tenderness


Extremities:  Other (2+ pitting edema)


Neuro:  Other (Right sided hemiparesis)


Psych/Mental Status:  Other (depressed mood)





Results/Procedures


Lab


Laboratory Tests


22 17:16: Glucometer 124H


22 00:13: Glucometer 115H


22 05:13: Glucometer 126H


22 05:50: 


White Blood Count 10.4, Red Blood Count 4.83, Hemoglobin 13.4, Hematocrit 41, 

Mean Corpuscular Volume 85, Mean Corpuscular Hemoglobin 28, Mean Corpuscular 

Hemoglobin Concent 33, Red Cell Distribution Width 13.2, Platelet Count 406H, 

Mean Platelet Volume 10.0, Immature Granulocyte % (Auto) 0, Neutrophils (%) 

(Auto) 70, Lymphocytes (%) (Auto) 15, Monocytes (%) (Auto) 10, Eosinophils (%) 

(Auto) 4, Basophils (%) (Auto) 1, Neutrophils # (Auto) 7.4, Lymphocytes # (Auto)

1.5, Monocytes # (Auto) 1.0, Eosinophils # (Auto) 0.4H, Basophils # (Auto) 0.1, 

Immature Granulocyte # (Auto) 0.0, Sodium Level 144, Potassium Level 3.4L, Ch

loride Level 104, Carbon Dioxide Level 23, Anion Gap 17H, Blood Urea Nitrogen 

14, Creatinine 0.73, Estimat Glomerular Filtration Rate 109, BUN/Creatinine 

Ratio 19, Glucose Level 119H, Calcium Level 9.4, Corrected Calcium 9.8, Total 

Bilirubin 0.7, Aspartate Amino Transf (AST/SGOT) 14, Alanine Aminotransferase 

(ALT/SGPT) 10, Alkaline Phosphatase 74, Total Protein 7.2, Albumin 3.5


22 11:08: Glucometer 129H





Microbiology


22 MRSA Screen - Final, Complete


          MRSA not isolated


Radiology


[place], [place]





NAME:   MURRAY PICKETT


MED REC#:   [id number]


ACCOUNT#:   [id number]


PT STATUS:   REG ER


:   1970


PHYSICIAN:   JOHN OJEDA


ADMIT DATE:   22/ER


***Draft***


Date of Exam:22





CT HEAD WO-R/O STROKE








PROCEDURE: CT head w/o r/o stroke.





TECHNIQUE: Multiple contiguous axial images were obtained through


the brain without the use of intravenous contrast. Auto Exposure


Controls were utilized during the CT exam to meet ALARA standards


for radiation dose reduction. 





INDICATION: Stroke.





COMPARISON: Prior examination from 2022.





FINDINGS: The ventricles and sulci are within normal limits.


There are a few lacunar infarcts. There is no hydrocephalus.


There is no midline shift. There is no mass, hemorrhage or


extra-axial fluid collection. The calvarium is intact. Sinuses


and mastoid air cells are clear.





IMPRESSION: Atrophy and some mild chronic microvascular ischemic


disease with a few lacunar infarcts, particularly in the left


basal ganglia and to a lesser degree right basal ganglia. No


other acute intracranial abnormality. If there is high clinical


concern for an acute CVA further evaluation with MRI should be


considered. 





  Dictated on workstation # [id number]








Dict:   22


Trans:   22


[facility] [id number]





Interpreted by:     JEFF ARMSTRONG MD


Electronically signed by:NAME:   MURRAY PICKETT


MED REC#:   [id number]


ACCOUNT#:   [id number]


PT STATUS:   ADM IN


:   1970


PHYSICIAN:   MARCO A HERNADEZ DO


ADMIT DATE:   07/10/22/[facility]


                                   ***Draft***


Date of Exam:22





US CAROTID RUBÉN COMPLETE [id number]








PROCEDURE: 


US carotid duplex bilateral.





TECHNIQUE: 


Multiple Real-time grayscale images were obtained over the


carotid arteries in various projections bilaterally. Additional


spectral analysis and color Doppler duplex images were also


obtained.





INDICATION: 


Followup post-CVA.





FINDINGS: 


The left carotid system and vertebral system could not be


adequately interrogated due to patient position and pain.





Real-time imaging shows minimal atherosclerotic plaquing within


the right carotid bulb and internal and external carotid


arteries. Waveforms and peak velocities are normal with normal


carotid ratios.





IMPRESSION:


1. Very limited exam. The right carotid artery shows no


hemodynamic disease with minimal plaquing.


2. Left carotid system and vertebral arteries were not adequately


evaluated due to patient's pain and position.


3. Previous CT angiography of the neck on 2022 suggested


the carotid and vertebral arteries to be widely patent without


significant stenosis.





Parameters based on the consensus panel Gray-Scale and Doppler


ultrasound criteria published 


2003, Radiology, Volume 229. 





DOPPLER (peak systolic velocity M/S 


    


                Right    Left





CCA              1.12     1.33





ICA Proximal      .50     NOT SEEN





ICA Mid           .63     NOT SEEN


 


ICA Distal        .76     NOT SEEN





RATIO            .68     N/A





ECA              .93     NOT SEEN





VERT              NOT SEEN     NOT SEEN








  Dictated on workstation # [id number]








Dict:   22 1034


Trans:   22 1047


 [id number]





Interpreted by:     CUAUHTEMOC GOMEZ MD


Electronically signed by:





Assessment/Plan


Assessment/Plan





(1) Acute CVA (cerebrovascular accident)


Status:  Acute


Assessment & Plan:  : Extensive workup of cause of multiple TIAs/CVAs with 

unknown cause, cardiac workup also negative,  stroke team consulted upon 

arrival and patient continued on DAPT, severe dyphagia with PEG tube placed this

admission, PT/OT/speech





(2) Right hemiparesis


Status:  Acute


(3) Dysphagia due to recent cerebrovascular accident


Status:  Acute


(4) Aphasia due to acute cerebrovascular accident (CVA)


Status:  Acute


(5) Acute and chronic respiratory failure with hypoxia


Status:  Acute


Assessment & Plan:  : requiring HF oxygen, will titrate as tolerated, MAT 

protocol





(6) HTN (hypertension)


Status:  Chronic


Assessment & Plan:  : been normotensive, will restart home meds thru PEG t

ube


Qualifiers:  


   Qualified Codes:  I10 - Essential (primary) hypertension


(7) Diabetes mellitus


Status:  Chronic


Assessment & Plan:  : SSI


Qualifiers:  


   Qualified Codes:  E11.9 - Type 2 diabetes mellitus without complications


(8) S/P percutaneous endoscopic gastrostomy (PEG) tube placement


(9) DVT prophylaxis


Status:  Acute


Assessment & Plan:  - SCDs, lovenox





(10) Discharge planning issues


Assessment & Plan:  : Planning for d/c home with HH and family caregivers, 

will need hospital bed, new oxygen start, suction














TASNEEM MOELLER MD               2022 13:59

## 2022-07-19 VITALS — DIASTOLIC BLOOD PRESSURE: 80 MMHG | SYSTOLIC BLOOD PRESSURE: 136 MMHG

## 2022-07-19 VITALS — DIASTOLIC BLOOD PRESSURE: 91 MMHG | SYSTOLIC BLOOD PRESSURE: 155 MMHG

## 2022-07-19 VITALS — SYSTOLIC BLOOD PRESSURE: 165 MMHG | DIASTOLIC BLOOD PRESSURE: 90 MMHG

## 2022-07-19 VITALS — SYSTOLIC BLOOD PRESSURE: 152 MMHG | DIASTOLIC BLOOD PRESSURE: 85 MMHG

## 2022-07-19 VITALS — DIASTOLIC BLOOD PRESSURE: 93 MMHG | SYSTOLIC BLOOD PRESSURE: 157 MMHG

## 2022-07-19 VITALS — SYSTOLIC BLOOD PRESSURE: 165 MMHG | DIASTOLIC BLOOD PRESSURE: 88 MMHG

## 2022-07-19 LAB
ALBUMIN SERPL-MCNC: 3.4 GM/DL (ref 3.2–4.5)
ALP SERPL-CCNC: 67 U/L (ref 40–136)
ALT SERPL-CCNC: 10 U/L (ref 0–55)
BASOPHILS # BLD AUTO: 0.1 10^3/UL (ref 0–0.1)
BASOPHILS NFR BLD AUTO: 1 % (ref 0–10)
BILIRUB SERPL-MCNC: 0.6 MG/DL (ref 0.1–1)
BUN/CREAT SERPL: 24
CALCIUM SERPL-MCNC: 9.2 MG/DL (ref 8.5–10.1)
CHLORIDE SERPL-SCNC: 107 MMOL/L (ref 98–107)
CO2 SERPL-SCNC: 24 MMOL/L (ref 21–32)
CREAT SERPL-MCNC: 0.66 MG/DL (ref 0.6–1.3)
EOSINOPHIL # BLD AUTO: 0.4 10^3/UL (ref 0–0.3)
EOSINOPHIL NFR BLD AUTO: 4 % (ref 0–10)
GFR SERPLBLD BASED ON 1.73 SQ M-ARVRAT: 113 ML/MIN
GLUCOSE SERPL-MCNC: 114 MG/DL (ref 70–105)
HCT VFR BLD CALC: 41 % (ref 40–54)
HGB BLD-MCNC: 13.2 G/DL (ref 13.3–17.7)
LYMPHOCYTES # BLD AUTO: 1.5 10^3/UL (ref 1–4)
LYMPHOCYTES NFR BLD AUTO: 15 % (ref 12–44)
MANUAL DIFFERENTIAL PERFORMED BLD QL: NO
MCH RBC QN AUTO: 27 PG (ref 25–34)
MCHC RBC AUTO-ENTMCNC: 32 G/DL (ref 32–36)
MCV RBC AUTO: 85 FL (ref 80–99)
MONOCYTES # BLD AUTO: 1 10^3/UL (ref 0–1)
MONOCYTES NFR BLD AUTO: 10 % (ref 0–12)
NEUTROPHILS # BLD AUTO: 6.7 10^3/UL (ref 1.8–7.8)
NEUTROPHILS NFR BLD AUTO: 69 % (ref 42–75)
PLATELET # BLD: 392 10^3/UL (ref 130–400)
PMV BLD AUTO: 9.9 FL (ref 9–12.2)
POTASSIUM SERPL-SCNC: 3.4 MMOL/L (ref 3.6–5)
PROT SERPL-MCNC: 7 GM/DL (ref 6.4–8.2)
SODIUM SERPL-SCNC: 146 MMOL/L (ref 135–145)
WBC # BLD AUTO: 9.7 10^3/UL (ref 4.3–11)

## 2022-07-19 RX ADMIN — INSULIN ASPART SCH UNIT: 100 INJECTION, SOLUTION INTRAVENOUS; SUBCUTANEOUS at 23:52

## 2022-07-19 RX ADMIN — FAMOTIDINE SCH MG: 20 TABLET, FILM COATED ORAL at 19:50

## 2022-07-19 RX ADMIN — MICONAZOLE NITRATE SCH APPLIC: 20 POWDER TOPICAL at 09:00

## 2022-07-19 RX ADMIN — NYSTATIN SCH GM: 100000 CREAM TOPICAL at 09:01

## 2022-07-19 RX ADMIN — INSULIN ASPART SCH UNIT: 100 INJECTION, SOLUTION INTRAVENOUS; SUBCUTANEOUS at 12:21

## 2022-07-19 RX ADMIN — ENOXAPARIN SODIUM SCH MG: 100 INJECTION SUBCUTANEOUS at 11:18

## 2022-07-19 RX ADMIN — NYSTATIN SCH GM: 100000 CREAM TOPICAL at 13:09

## 2022-07-19 RX ADMIN — ASPIRIN SCH MG: 81 TABLET ORAL at 09:03

## 2022-07-19 RX ADMIN — MICONAZOLE NITRATE SCH APPLIC: 20 POWDER TOPICAL at 19:50

## 2022-07-19 RX ADMIN — METOPROLOL TARTRATE SCH MG: 50 TABLET, FILM COATED ORAL at 09:00

## 2022-07-19 RX ADMIN — ENOXAPARIN SODIUM SCH MG: 100 INJECTION SUBCUTANEOUS at 23:56

## 2022-07-19 RX ADMIN — ENOXAPARIN SODIUM SCH MG: 100 INJECTION SUBCUTANEOUS at 02:26

## 2022-07-19 RX ADMIN — METOPROLOL TARTRATE SCH MG: 50 TABLET, FILM COATED ORAL at 19:50

## 2022-07-19 RX ADMIN — INSULIN ASPART SCH UNIT: 100 INJECTION, SOLUTION INTRAVENOUS; SUBCUTANEOUS at 05:45

## 2022-07-19 RX ADMIN — NYSTATIN SCH GM: 100000 CREAM TOPICAL at 19:50

## 2022-07-19 RX ADMIN — CLOPIDOGREL BISULFATE SCH MG: 75 TABLET, FILM COATED ORAL at 09:00

## 2022-07-19 RX ADMIN — INSULIN ASPART SCH UNIT: 100 INJECTION, SOLUTION INTRAVENOUS; SUBCUTANEOUS at 17:21

## 2022-07-19 RX ADMIN — AMLODIPINE BESYLATE SCH MG: 5 TABLET ORAL at 09:00

## 2022-07-19 RX ADMIN — FAMOTIDINE SCH MG: 20 TABLET, FILM COATED ORAL at 09:00

## 2022-07-19 RX ADMIN — EMPAGLIFLOZIN SCH MG: 10 TABLET, FILM COATED ORAL at 19:50

## 2022-07-19 NOTE — PROGRESS NOTE - CARDIOLOGY
Cardiology SOAP Progress Note


Objective:


I&O/Vital Signs











 7/19/22 7/20/22 7/20/22 7/20/22





 23:43 00:14 03:14 04:30


 


Temp 35.6  36.5 


 


Pulse 92  100 


 


Resp 22  22 


 


B/P (MAP) 152/85 (107)   147/76 (99)


 


Pulse Ox 96 96 96 


 


O2 Delivery High Flow N/C High Flow N/C High Flow N/C 


 


O2 Flow Rate 6.00 6.00 6.00 


 


    





 7/20/22 7/20/22 7/20/22 





 07:33 07:39 08:00 


 


Temp  36.0  


 


Pulse  108  


 


Resp  23  


 


B/P (MAP)  160/98 (118)  


 


Pulse Ox 93 95 95 


 


O2 Delivery High Flow N/C High Flow N/C Nasal Cannula 


 


O2 Flow Rate 6.00 6.00 6.00 














 7/20/22





 00:00


 


Intake Total 1470 ml


 


Output Total 1125 ml


 


Balance 345 ml








Constitutional:  well-developed, other (non-verbal - nods head slowly in 

response to questions)


Respiratory:  No accessory muscle use, No respiratory distress; chest expansion 

is symmetric, chest is bilaterally symmetric, other (coarse breath sounds with 

productive cough)


Cardiovascular:  regular rate-rhythm; No JVD; tachycardia, S1 and S2


Gastrointestional:  No tender; soft, round, audible bowel sounds, other (PEG 

tube in place)


Extremities:  no lower extremity edema bilateral


Neurologic/Psychiatric:  other (RUE and RLE flaccid, facial droop right sided; 

LLE and LUE chronically weak 4/5; aphasic)


Skin:  No rash on exposed areas, No ulcerations on exposed areas





Results/Procedures:


Labs


Laboratory Tests


7/19/22 11:08: Glucometer 124H


7/19/22 17:12: Glucometer 149H


7/19/22 23:48: Glucometer 137H


7/20/22 05:19: Glucometer 125H


7/20/22 07:00: 


White Blood Count 9.9, Red Blood Count 4.92, Hemoglobin 13.4, Hematocrit 42, 

Mean Corpuscular Volume 85, Mean Corpuscular Hemoglobin 27, Mean Corpuscular 

Hemoglobin Concent 32, Red Cell Distribution Width 13.4, Platelet Count 426H, 

Mean Platelet Volume 10.1, Immature Granulocyte % (Auto) 0, Neutrophils (%) (Aut

o) 70, Lymphocytes (%) (Auto) 14, Monocytes (%) (Auto) 11, Eosinophils (%) 

(Auto) 4, Basophils (%) (Auto) 1, Neutrophils # (Auto) 6.9, Lymphocytes # (Auto)

1.4, Monocytes # (Auto) 1.1H, Eosinophils # (Auto) 0.4H, Basophils # (Auto) 0.1,

Immature Granulocyte # (Auto) 0.0, Sodium Level 146H, Potassium Level 3.5L, 

Chloride Level 105, Carbon Dioxide Level 27, Anion Gap 14, Blood Urea Nitrogen 

18, Creatinine 0.64, Estimat Glomerular Filtration Rate 114, BUN/Creatinine 

Ratio 28, Glucose Level 139H, Calcium Level 9.2, Corrected Calcium 9.7, Total 

Bilirubin 0.5, Aspartate Amino Transf (AST/SGOT) 13, Alanine Aminotransferase 

(ALT/SGPT) 8, Alkaline Phosphatase 69, Total Protein 7.2, Albumin 3.4





Microbiology


7/12/22 MRSA Screen - Final, Complete


          MRSA not isolated








A/P:


Assessment:





CVA with right sided hemiparesis, aphasia, dysphagia


- reported h/o several CVA/TIA's following COVID infection in 2021 with left 

sided weakness - presents this time with righ sided hemiparesis/aphasi

a/dysphagia


- The stroke team at  was consulted with no further recommendations other than

continuing aspirin and Plavix and obtaining an MRI


- Management per stroke team


- Follows with Dr. Ramirez of neurology services at [facility]


- Carotid u/s7-11-22:  Very limited exam. The right carotid artery shows no 

hemodynamic disease with minimal plaquing.  Left carotid system and vertebral ar

teries were not adequately evaluated due to patient's pain and position. 

Previous CT angiography of the neck on 07/09/2022 suggested


the carotid and vertebral arteries to be widely patent without significant 

stenosis


- ILR implanted on 7/12/22 to eval for occult A Fib as the source of his CVA





?Angioedema


- Lisinopril stopped





Fall at home





Sleep apnea


- non-complaint with sleep study





? aspiration


- S/P PEG tube placement on 7-14-22 by Dr. Olmedo





HTN


- currently controlled





Crohn's dz


- h/o colectomy





DM 2


Plan:





CVA


- management per stroke team - please see recs per [facility] as above


- source undetermined - implanted ILR 7-12-22 to r/o possible a-fib/flutter as 

cause. No significant arrhythmia seen so far 


S/P PEG tube placement 


Hypertensive and tachycardic


- increase BB


Monitor lab from time to time and replenish electrolytes


Discharge planning in place - probable home with home health











ROXANNE BRADY           Jul 19, 2022 10:45

## 2022-07-19 NOTE — ST DYSPHAGIA EVALUATION
Speech Evaluation-General


Medical Diagnosis


CVA


Onset Date:  Jul 10, 2022





Therapy Diagnosis


Therapy Diagnosis:  severe oropharyngeal dysphagia





Precautions


Precautions:  Aspiration


Precautions/Isolations:  Aspiration





Referral


Referring Physician:  Gilda Esquivel


Reason for Referral:  Evaluation/Treatment





Medical History


Pertinent Medical History:  CVA


CVA


Current History


CVA


Reviewed History:  Yes





Social History


Current Living Status:  Spouse





Speech PLF/Current-Dysphagia


Prior Level of Function


NPO since receiving care at [facility]. Spouse reported he consumed 

soft, bite sized solids and thin liquids at home prior to hospitalization





Subjective


Pt was sitting upright in bed and was cooperative and friendly. Pt was willing 

to participate in PO trials as indicated by a "thumbs up" nonverbal gesture. 

Pt's wife was present at bedside and reported Pt's history due to Pt's 

expressive aphasia. Pt used nonverbal gestures, AAC, and single words with 

effortful voicing during the evaluation.





Cognitive Status


Patient Orientation:  Non-Verbal/Aphasic





Oral Motor Skills


Dentition:  Edentalous


Ability to Follow Directions:  Fair


Pt reportedly has dentures, but refuses to wear them. Pt demonstrated minimal o

ral secretions and drooling during today's visit. Wife reported secretions are 

typically excessive. Pt occasionally used oral cavity suction


Oral Expression Ability:  Severe Impairment





Voice


Voice Phonatory-Based Quality:  Weak


Voice Pitch:  Normal


Voice Loudness:  Moderately Soft/Quiet





Face


Facial Symmetry:  Asymmetrical





Oral-Facial Assessment


Oral-Facial Dentition:  Normal


Labial Seal Description:  Reduced ROM, Weak, Poor Coordination


Smile:  Reduced ROM, Poor Coordination


Puff Cheeks:  Reduced Strength


Lingual Protrusion:  Abnormal


lingual protrusion/open mouth posture


Lingual ROM:  Abnormal


did not demonstrate lingual/oral manipulation of ice chips or puree


Lingual Strength:  Abnormal


Volitional Dry Swallow:  No


Voluntary Cough:  No


Can Clear Throat Volitionally:  No


Productive Cough:  Yes


Productive Throat Clear:  Yes





Dysphagia Evaluation


Consistencies Presented:  Thin Liquid, Pureed


thin liquid were ice chips only. 1/2 tsp puree.


Oral Phase:  Unable to Form Bolus, Reduced Oral Transit


Pharyngeal Phase:  Multiple Swallow Attempts, Clears Throat


max verbal prompts to elicit swallow with and without bolus present. 

demonstrated oral holding. laryngeal elevation present to palpation


Funct. Velo/Pharyngeal Symptom:  Clears Throat, Cough After Swallow, Wet Voice


Dietary Recommendations:  NPO


Liquid Recommendations:  NPO


1) NPO


2) Continued oral care to decrease oral bacteria to lungs should aspiration 

occur


3) Continue effortful swallow strategy 


4) Schedule MBSS. Spouse interested in a MBSS to determine aspiration and 

consistencies tolerated.


S/s of suspected aspiration with own saliva and PO trials (ice chips and 1/2 tsp

puree) demonstrated by immediate cough, red face, watery eyes, and wet vocal 

quality.


Dysphagia Evaluation Summary


Pt presents with severe oropharyngeal dysphagia characterized by reduced facial,

oral, and lingual strength and coordination. Pt demonstrates delayed onset of 

swallow and poor airway protection with PO trials and own secretions. NPO diet 

recommended at this time with continued skilled ST to determine possible 

advancement of PO diet consistencies


Barriers to Learning


cognition, expressive communication, fatigue





Speech Short Term Goals


Short Term Goals


Short Term Goals


1. The patient will tolerate the least restrictive consistency of P.O. trials 

for possible advancement of a P.O. diet consistency.





2. The patient will demonstrate intelligibility strategies with 50% accuracy and

maximum clinician verbal and visual cueing.


Time Frame-STG:  Three Weeks.





Speech Long Term Goals


Long Term Goals


1. The patient will tolerate the least restrictive consistency without s/s of 

suspected aspiration.





2. The patient will improve expressive communication for increased safety with 

discharge to the least restricted environment.


Time Frame:  One Week.





Speech-Plan


Patient/Family Goals


Patient/Family Goals:  


diet advancement including thin liquids and soft, bite-sized solids





Treatment Plan


Speech Therapy Treatment Plan:  Continue Plan of Care


Pt demonstrates severe oropharyngeal dysphagia and NPO diet is recommended at 

this time. Skilled ST recommended to target diet advancement and safe swallow 

strategies/precautions


Treatment Duration:  Aug 1, 2022


Frequency:  4 times per week (Four to five times per week.)


Estimated Hrs Per Day:  .25 hour per day


Rehab Potential:  Poor


Barriers to Learning:  


cognition, fatigue, expressive communication


Pt/Family Agrees to Plan:  Yes





Safety Risks/Education


Teaching Recipient:  Patient, Significant Other


Teaching Methods:  Discussion


Response to Teaching:  Verbalize Understanding


Education Topics Provided:  


safe swallow strategies (e.g. effortful swallow) and evaluation results





Time


Speech Therapy Time In:  13:00


Speech Therapy Time Out:  13:30


Total Billed Time:  30


Billed Treatment Time


KAREEN Reid


PASCUAL Hernandez                Jul 19, 2022 14:36

## 2022-07-19 NOTE — PHYSICAL THERAPY DAILY NOTE
PT Daily Note-Current


Subjective


Patient and spouse agree to PT/OT cotreat due to patient's complete dependence 

for all mobility.





Transfers


SCALE: Activities may be completed with or without assistive devices.





6-Indepedent-patient completes the activity by him/herself with no assistance 

from a helper.


5-Set-up or Clean-up Assistance-helper sets up or cleans up; patient completes 

activity. [place] assists only prior to or  


    following the activity.


4-Supervision or Touching Assistance-helper provides verbal cues and/or 

touching/steadying and/or contact guard assistance as patient completes 

activity. Assistance may be provided   


    throughout the activity or intermittently.


3-Partial/Moderate Assistance-helper does LESS THAN HALF the effort. [place] 

lifts, holds or supports trunk or limbs, but provides less than half the effort.


2-Substantial/Maximal Assistance-helper does MORE THAN HALF the effort. [place] 

lifts or holds trunk or limbs and provides more than half the effort.


2-Xfcqoatss-jebbdx does ALL the effort. Patient does none of the effort to 

complete the activity. Or, the assistance of 2 or more helpers is required for 

the patient to complete the  


    activity.


If activity was not attempted, code reason:


7-Patient Refused.


9-Not Applicable-not attempted and the patient did not perform the activity 

before the current illness, exacerbation or injury.


10-Not Attempted due to Environmental Limitations-(lack of equipment, weather 

restraints, etc.).


88-Not Attempted due to Medical Conditions or Safety Concerns.


Roll Left & Right (QC):  1 (x 3)


rolling several times to assist with bathing and changing the bed.





Assessment


Patient very alert and responsive with all treatment.  Patient continues to be 

dependent of several people for bed mobility and bathing.





PT Long Term Goals


Long Term Goals


PT Long Term Goals Time Frame:  Jul 18, 2022


Roll Left & Right (QC):  3


Sit to Lying (QC):  3


Lying-Sitting on Side/Bed(QC):  3


Sit to Stand (QC):  3


Chair/Bed-to-Chair Xfer(QC):  2





PT Plan


Treatment/Plan


Treatment Plan:  Continue Plan of Care


Treatment Plan:  Bed Mobility, Education, Functional Activity Promise, Functional 

Strength, Gait, Safety, Therapeutic Exercise, Transfers


Treatment Duration:  Jul 18, 2022


Frequency:  6 times per week


Estimated Hrs Per Day:  .25 hour per day


Patient and/or Family Agrees t:  Yes





Time/GCodes


Time In:  1033


Time Out:  1056


Total Billed Treatment Time:  23


Total Billed Treatment


1 visit


FA x 2 23 min











MERRILL LAWLER PT              Jul 19, 2022 11:03

## 2022-07-19 NOTE — OCCUPATIONAL THER DAILY NOTE
OT Current Status-Daily Note


Subjective


Pt alert, lying in bed.  Wife present in room.  Pt a lot more interactive today,

smiling and attempting to joke with staff.





Mental Status/Objective


Patient Orientation:  Person, Place, Non-Verbal/Aphasic, Time, Situation


Attachments:  Nuñez Catheter, IV, Oxygen, Telemetry (loop recorder)





ADL-Treatment


Dependent for bathing, bed mobility.  No movement noted in R UE.  Completed 

bathing with assist x4.  Assist x3-4 to scoot pt up in bed.  After session, pt 

lying in bed with call light/phone in reach.  All needs met in room.


Therapy Code Descriptions/Definitions 





Functional [place] Measure:


0=Not Assessed/NA        4=Minimal Assistance


1=Total Assistance        5=Supervision or Setup


2=Maximal Assistance  6=Modified [place]


3=Moderate Assistance 7=Complete IndependenceSCALE: Activities may be completed 

with or without assistive devices.





6-Indepedent-patient completes the activity by him/herself with no assistance 

from a helper.


5-Set-up or Clean-up Assistance-helper sets up or cleans up; patient completes 

activity. [place] assists only prior to or  


    following the activity.


4-Supervision or Touching Assistance-helper provides verbal cues and/or touc

navdeep/steadying and/or contact guard assistance as patient completes activity. 

Assistance may be provided   


    throughout the activity or intermittently.


3-Partial/Moderate Assistance-helper does LESS THAN HALF the effort. [place] 

lifts, holds or supports trunk or limbs, but provides less than half the effort.


2-Substantial/Maximal Assistance-helper does MORE THAN HALF the effort. [place] 

lifts or holds trunk or limbs and provides more than half the effort.


3-Vtzqanvpn-iqsloi does ALL the effort. Patient does none of the effort to 

complete the activity. Or, the assistance of 2 or more helpers is required for 

the patient to complete the  


    activity.


If activity was not attempted, code reason:


7-Patient Refused.


9-Not Applicable-not attempted and the patient did not perform the activity 

before the current illness, exacerbation or injury.


10-Not Attempted due to Environmental Limitations-(lack of equipment, weather 

restraints, etc.).


88-Not Attempted due to Medical Conditions or Safety Concerns.


Shower/Bathe Self (QC):  1





OT Long Term Goals


Long Term Goals


Time Frame:  Aug 12, 2022


Eating (QC):  3


Toileting Hygiene (QC):  3


Shower/Bathe Self (QC):  2


Upper Body Dressing (QC):  3


Lower Body Dressing (QC):  2


On/Off Footwear (QC):  2


Additional Goals:  1-Demonstrate ADL Tasks, 2-Verbalize Understanding, 3-

ImproveStrength/Promise


1=Demonstrate adherence to instructed precautions during ADL tasks.


2=Patient will verbalize/demonstrate understanding of assistive 

devices/modifications for ADL.


3=Patient will improve strength/tolerance for activity to enable patient to 

perform ADL's.





OT Education/Plan


Problem List/Assessment


Assessment:  Decreased Activ Tolerance, Decreased UE Strength, Dependent 

Transfers, Impaired Bed Mobility, Impaired Self-Care Skills, Restricted Funct UE

ROM


Pt would benefit from skilled OT services in order to increase functional use of

RUE, improve independence and safety with ADLs and functional mobility, and for 

neuromuscular reeducation in order to maximize LOF for safe return home with 

spouse.





Discharge Recommendations


Plan/Recommendations:  Continue POC





Treatment Plan/Plan of Care


Patient would benefit from OT for education, treatment and training to promote 

independence in ADL's, mobility, safety and/or upper extremity function for 

ADL's.


Plan of Care:  ADL Retraining, Functional Mobility, UE Funct Exercise/Act, UE 

Neuromus Re-Ed/Coord


Treatment Duration:  Aug 12, 2022


Frequency:  5 times per week


Estimated Hrs Per Day:  .25 hour per day


Rehab Potential:  Poor





Time/GCodes


Start Time:  10:33


Stop Time:  10:56


Total Time Billed (hr/min):  23


Billed Treatment Time


1 visit-FA 2 (23 min)











NACHO SIMMS               Jul 19, 2022 11:18

## 2022-07-19 NOTE — PROGRESS NOTE
Subjective


Subjective/Events-last exam


Patient gives a thumb up this AM to state that he is feeling better. Doing oral 

suction care himself.





Order for home hospital be today. Patient is unable to reposition self and will 

need frequent repositioning every 2-4 hrs. He is very high risk for pressure 

sores due to his severe physical debility and hemiparesis. He will need the HOB 

at least 30 degrees due to difficulty clearing secretions and difficulty 

breathing. He is unable to be positioned with pillows and needs electric 

hospital bed.


Review of Systems


Denies any pain





Objective


Exam


Last Set of Vital Signs





Vital Signs








  Date Time  Temp Pulse Resp B/P (MAP) Pulse Ox O2 Delivery O2 Flow Rate FiO2


 


22 20:34      Nasal Cannula 6.00 


 


22 19:29 35.9 105 20 165/90 (115) 97   


 


22 11:52        98





Capillary Refill : Less Than 3 SecondsLess Than 3 Seconds


I&O











Intake and Output 


 


 22





 23:59


 


Intake Total 1735 ml


 


Output Total 2175 ml


 


Balance -440 ml


 


 


 


Intake Oral 0 ml


 


Tube Feeding 1080 ml


 


Other 655 ml


 


Output Urine Total 2175 ml








General:  Alert, No Acute Distress


Lungs:  Other (Normal work of breathing, basilar wheezing)


Heart:  Regular Rate, No Murmurs


Abdomen:  Normal Bowel Sounds, Soft, No Tenderness, No Masses


Extremities:  Other (2+ pitting edema )





Results/Procedures


Lab


Laboratory Tests


22 23:09: Glucometer 112H


22 05:13: Glucometer 104


22 06:13: 


White Blood Count 9.7, Red Blood Count 4.86, Hemoglobin 13.2L, Hematocrit 41, 

Mean Corpuscular Volume 85, Mean Corpuscular Hemoglobin 27, Mean Corpuscular Hem

oglobin Concent 32, Red Cell Distribution Width 13.3, Platelet Count 392, Mean 

Platelet Volume 9.9, Immature Granulocyte % (Auto) 0, Neutrophils (%) (Auto) 69,

Lymphocytes (%) (Auto) 15, Monocytes (%) (Auto) 10, Eosinophils (%) (Auto) 4, 

Basophils (%) (Auto) 1, Neutrophils # (Auto) 6.7, Lymphocytes # (Auto) 1.5, 

Monocytes # (Auto) 1.0, Eosinophils # (Auto) 0.4H, Basophils # (Auto) 0.1, 

Immature Granulocyte # (Auto) 0.0, Sodium Level 146H, Potassium Level 3.4L, 

Chloride Level 107, Carbon Dioxide Level 24, Anion Gap 15H, Blood Urea Nitrogen 

16, Creatinine 0.66, Estimat Glomerular Filtration Rate 113, BUN/Creatinine 

Ratio 24, Glucose Level 114H, Calcium Level 9.2, Corrected Calcium 9.7, Total 

Bilirubin 0.6, Aspartate Amino Transf (AST/SGOT) 11, Alanine Aminotransferase 

(ALT/SGPT) 10, Alkaline Phosphatase 67, Total Protein 7.0, Albumin 3.4


22 11:08: Glucometer 124H


22 17:12: Glucometer 149H





Microbiology


22 MRSA Screen - Final, Complete


          MRSA not isolated


Radiology


[place], [place]





NAME:   MURRAY PICKETT


MED REC#:   [id number]


ACCOUNT#:   [id number]


PT STATUS:   REG ER


:   1970


PHYSICIAN:   JOHN OJEDA


ADMIT DATE:   22/ER


***Draft***


Date of Exam:22





CT HEAD WO-R/O STROKE








PROCEDURE: CT head w/o r/o stroke.





TECHNIQUE: Multiple contiguous axial images were obtained through


the brain without the use of intravenous contrast. Auto Exposure


Controls were utilized during the CT exam to meet ALARA standards


for radiation dose reduction. 





INDICATION: Stroke.





COMPARISON: Prior examination from 2022.





FINDINGS: The ventricles and sulci are within normal limits.


There are a few lacunar infarcts. There is no hydrocephalus.


There is no midline shift. There is no mass, hemorrhage or


extra-axial fluid collection. The calvarium is intact. Sinuses


and mastoid air cells are clear.





IMPRESSION: Atrophy and some mild chronic microvascular ischemic


disease with a few lacunar infarcts, particularly in the left


basal ganglia and to a lesser degree right basal ganglia. No


other acute intracranial abnormality. If there is high clinical


concern for an acute CVA further evaluation with MRI should be


considered. 





  Dictated on workstation # [id number]








Dict:   22


Trans:   22


PJE [id number]





Interpreted by:     JEFF ARMSTRONG MD


Electronically signed by:NAME:   MURRAY PICKETT


MED REC#:   [id number]


ACCOUNT#:   [id number]


PT STATUS:   ADM IN


:   1970


PHYSICIAN:   MARCO A HERNADEZ DO


ADMIT DATE:   07/10/22/MARCUS


                                   ***Draft***


Date of Exam:22





US CAROTID RUBÉN COMPLETE [id number]








PROCEDURE: 


US carotid duplex bilateral.





TECHNIQUE: 


Multiple Real-time grayscale images were obtained over the


carotid arteries in various projections bilaterally. Additional


spectral analysis and color Doppler duplex images were also


obtained.





INDICATION: 


Followup post-CVA.





FINDINGS: 


The left carotid system and vertebral system could not be


adequately interrogated due to patient position and pain.





Real-time imaging shows minimal atherosclerotic plaquing within


the right carotid bulb and internal and external carotid


arteries. Waveforms and peak velocities are normal with normal


carotid ratios.





IMPRESSION:


1. Very limited exam. The right carotid artery shows no


hemodynamic disease with minimal plaquing.


2. Left carotid system and vertebral arteries were not adequately


evaluated due to patient's pain and position.


3. Previous CT angiography of the neck on 2022 suggested


the carotid and vertebral arteries to be widely patent without


significant stenosis.





Parameters based on the consensus panel Gray-Scale and Doppler


ultrasound criteria published 


2003, Radiology, Volume 229. 





DOPPLER (peak systolic velocity M/S 


    


                Right    Left





CCA              1.12     1.33





ICA Proximal      .50     NOT SEEN





ICA Mid           .63     NOT SEEN


 


ICA Distal        .76     NOT SEEN





RATIO            .68     N/A





ECA              .93     NOT SEEN





VERT              NOT SEEN     NOT SEEN








  Dictated on workstation # [id number]








Dict:   22 1034


Trans:   22 1047


 [id number]





Interpreted by:     CUAUHTEMOC GOMEZ MD


Electronically signed by:





Assessment/Plan


Assessment/Plan





(1) Acute CVA (cerebrovascular accident)


Status:  Acute


Assessment & Plan:  : Extensive workup of cause of multiple TIAs/CVAs with 

unknown cause, cardiac workup also negative,  stroke team consulted upon 

arrival and patient continued on DAPT, severe dyphagia with PEG tube placed this

admission, PT/OT/speech


: order for hosp bed, home suction in preperation for home with 





(2) Right hemiparesis


Status:  Acute


(3) Dysphagia due to recent cerebrovascular accident


Status:  Acute


(4) Aphasia due to acute cerebrovascular accident (CVA)


Status:  Acute


(5) Acute and chronic respiratory failure with hypoxia


Status:  Acute


Assessment & Plan:  : requiring HF oxygen, will titrate as tolerated, MAT 

protocol





(6) HTN (hypertension)


Status:  Chronic


Assessment & Plan:  : been normotensive, will restart home meds thru PEG 

tube


Qualifiers:  


   Qualified Codes:  I10 - Essential (primary) hypertension


(7) Diabetes mellitus


Status:  Chronic


Assessment & Plan:  : SSI


Qualifiers:  


   Qualified Codes:  E11.9 - Type 2 diabetes mellitus without complications


(8) S/P percutaneous endoscopic gastrostomy (PEG) tube placement


Assessment & Plan:  : Bolus feeding Glucerna 5 cans daily with 90 ml free 

water before and after each meal





(9) DVT prophylaxis


Status:  Acute


Assessment & Plan:  - SCDs, lovenox





(10) Discharge planning issues


Assessment & Plan:  : Planning for d/c home with HH and family caregivers, 

will need hospital bed, new oxygen start, suction














TASNEEM MOELLER MD               2022 22:02

## 2022-07-20 VITALS — DIASTOLIC BLOOD PRESSURE: 76 MMHG | SYSTOLIC BLOOD PRESSURE: 147 MMHG

## 2022-07-20 VITALS — DIASTOLIC BLOOD PRESSURE: 98 MMHG | SYSTOLIC BLOOD PRESSURE: 160 MMHG

## 2022-07-20 VITALS — SYSTOLIC BLOOD PRESSURE: 134 MMHG | DIASTOLIC BLOOD PRESSURE: 75 MMHG

## 2022-07-20 VITALS — SYSTOLIC BLOOD PRESSURE: 149 MMHG | DIASTOLIC BLOOD PRESSURE: 79 MMHG

## 2022-07-20 VITALS — DIASTOLIC BLOOD PRESSURE: 93 MMHG | SYSTOLIC BLOOD PRESSURE: 158 MMHG

## 2022-07-20 LAB
ALBUMIN SERPL-MCNC: 3.4 GM/DL (ref 3.2–4.5)
ALP SERPL-CCNC: 69 U/L (ref 40–136)
ALT SERPL-CCNC: 8 U/L (ref 0–55)
BASOPHILS # BLD AUTO: 0.1 10^3/UL (ref 0–0.1)
BASOPHILS NFR BLD AUTO: 1 % (ref 0–10)
BILIRUB SERPL-MCNC: 0.5 MG/DL (ref 0.1–1)
BUN/CREAT SERPL: 28
CALCIUM SERPL-MCNC: 9.2 MG/DL (ref 8.5–10.1)
CHLORIDE SERPL-SCNC: 105 MMOL/L (ref 98–107)
CO2 SERPL-SCNC: 27 MMOL/L (ref 21–32)
CREAT SERPL-MCNC: 0.64 MG/DL (ref 0.6–1.3)
EOSINOPHIL # BLD AUTO: 0.4 10^3/UL (ref 0–0.3)
EOSINOPHIL NFR BLD AUTO: 4 % (ref 0–10)
GFR SERPLBLD BASED ON 1.73 SQ M-ARVRAT: 114 ML/MIN
GLUCOSE SERPL-MCNC: 139 MG/DL (ref 70–105)
HCT VFR BLD CALC: 42 % (ref 40–54)
HGB BLD-MCNC: 13.4 G/DL (ref 13.3–17.7)
LYMPHOCYTES # BLD AUTO: 1.4 10^3/UL (ref 1–4)
LYMPHOCYTES NFR BLD AUTO: 14 % (ref 12–44)
MANUAL DIFFERENTIAL PERFORMED BLD QL: NO
MCH RBC QN AUTO: 27 PG (ref 25–34)
MCHC RBC AUTO-ENTMCNC: 32 G/DL (ref 32–36)
MCV RBC AUTO: 85 FL (ref 80–99)
MONOCYTES # BLD AUTO: 1.1 10^3/UL (ref 0–1)
MONOCYTES NFR BLD AUTO: 11 % (ref 0–12)
NEUTROPHILS # BLD AUTO: 6.9 10^3/UL (ref 1.8–7.8)
NEUTROPHILS NFR BLD AUTO: 70 % (ref 42–75)
PLATELET # BLD: 426 10^3/UL (ref 130–400)
PMV BLD AUTO: 10.1 FL (ref 9–12.2)
POTASSIUM SERPL-SCNC: 3.5 MMOL/L (ref 3.6–5)
PROT SERPL-MCNC: 7.2 GM/DL (ref 6.4–8.2)
SODIUM SERPL-SCNC: 146 MMOL/L (ref 135–145)
WBC # BLD AUTO: 9.9 10^3/UL (ref 4.3–11)

## 2022-07-20 RX ADMIN — MICONAZOLE NITRATE SCH APPLIC: 20 POWDER TOPICAL at 08:31

## 2022-07-20 RX ADMIN — INSULIN ASPART SCH UNIT: 100 INJECTION, SOLUTION INTRAVENOUS; SUBCUTANEOUS at 23:33

## 2022-07-20 RX ADMIN — EMPAGLIFLOZIN SCH MG: 10 TABLET, FILM COATED ORAL at 21:54

## 2022-07-20 RX ADMIN — CLOPIDOGREL BISULFATE SCH MG: 75 TABLET, FILM COATED ORAL at 08:30

## 2022-07-20 RX ADMIN — METOPROLOL TARTRATE SCH MG: 50 TABLET, FILM COATED ORAL at 08:30

## 2022-07-20 RX ADMIN — METOPROLOL TARTRATE SCH MG: 50 TABLET, FILM COATED ORAL at 21:54

## 2022-07-20 RX ADMIN — MICONAZOLE NITRATE SCH APPLIC: 20 POWDER TOPICAL at 21:54

## 2022-07-20 RX ADMIN — FAMOTIDINE SCH MG: 20 TABLET, FILM COATED ORAL at 08:31

## 2022-07-20 RX ADMIN — NYSTATIN SCH GM: 100000 CREAM TOPICAL at 12:48

## 2022-07-20 RX ADMIN — INSULIN ASPART SCH UNIT: 100 INJECTION, SOLUTION INTRAVENOUS; SUBCUTANEOUS at 11:50

## 2022-07-20 RX ADMIN — FAMOTIDINE SCH MG: 20 TABLET, FILM COATED ORAL at 21:54

## 2022-07-20 RX ADMIN — AMLODIPINE BESYLATE SCH MG: 5 TABLET ORAL at 08:30

## 2022-07-20 RX ADMIN — ENOXAPARIN SODIUM SCH MG: 100 INJECTION SUBCUTANEOUS at 11:23

## 2022-07-20 RX ADMIN — INSULIN ASPART SCH UNIT: 100 INJECTION, SOLUTION INTRAVENOUS; SUBCUTANEOUS at 05:22

## 2022-07-20 RX ADMIN — INSULIN ASPART SCH UNIT: 100 INJECTION, SOLUTION INTRAVENOUS; SUBCUTANEOUS at 17:34

## 2022-07-20 RX ADMIN — ASPIRIN SCH MG: 81 TABLET ORAL at 08:29

## 2022-07-20 RX ADMIN — NYSTATIN SCH GM: 100000 CREAM TOPICAL at 08:31

## 2022-07-20 RX ADMIN — NYSTATIN SCH GM: 100000 CREAM TOPICAL at 21:55

## 2022-07-20 NOTE — OCCUPATIONAL THER DAILY NOTE
OT Current Status-Daily Note


Subjective


Pt lying in bed, alert though tired.  Pt agrees to therapy with a thumbs up.  

Wife present in room.





Mental Status/Objective


Patient Orientation:  Person, Place, Non-Verbal/Aphasic, Time, Situation


Attachments:  Nuñez Catheter, IV, PEG Tube





ADL-Treatment


Therapy Code Descriptions/Definitions 





Functional [place] Measure:


0=Not Assessed/NA        4=Minimal Assistance


1=Total Assistance        5=Supervision or Setup


2=Maximal Assistance  6=Modified [place]


3=Moderate Assistance 7=Complete IndependenceSCALE: Activities may be completed 

with or without assistive devices.





6-Indepedent-patient completes the activity by him/herself with no assistance 

from a helper.


5-Set-up or Clean-up Assistance-helper sets up or cleans up; patient completes 

activity. [place] assists only prior to or  


    following the activity.


4-Supervision or Touching Assistance-helper provides verbal cues and/or 

touching/steadying and/or contact guard assistance as patient completes 

activity. Assistance may be provided   


    throughout the activity or intermittently.


3-Partial/Moderate Assistance-helper does LESS THAN HALF the effort. [place] 

lifts, holds or supports trunk or limbs, but provides less than half the effort.


2-Substantial/Maximal Assistance-helper does MORE THAN HALF the effort. [place] 

lifts or holds trunk or limbs and provides more than half the effort.


9-Yxkoizvrm-hmmqbs does ALL the effort. Patient does none of the effort to com

plete the activity. Or, the assistance of 2 or more helpers is required for the 

patient to complete the  


    activity.


If activity was not attempted, code reason:


7-Patient Refused.


9-Not Applicable-not attempted and the patient did not perform the activity 

before the current illness, exacerbation or injury.


10-Not Attempted due to Environmental Limitations-(lack of equipment, weather 

restraints, etc.).


88-Not Attempted due to Medical Conditions or Safety Concerns.





Other Treatment


Pt able to follow simple directions though initiate of movements on L side is 

slow, R UE increased tone/stiffness.  Assist x3 for rolling and supine <--> EOB 

and to scoot up in bed with foot of bed elevated.  Max to mod A for EOB while pt

holds onto bed to stay upright.  Facilitation to bring head and upper torso to 

midline.  Pt sat EOB for 6 min.  While in sitting, assist to shampoo hair and 

wash back.  Pt positioned for comfort and HOB raised to above 30 degrees.  After

session, pt lying in bed with call light in reach and wife present in room.  All

needs met.





OT Long Term Goals


Long Term Goals


Time Frame:  Aug 12, 2022


Eating (QC):  3


Toileting Hygiene (QC):  3


Shower/Bathe Self (QC):  2


Upper Body Dressing (QC):  3


Lower Body Dressing (QC):  2


On/Off Footwear (QC):  2


Additional Goals:  1-Demonstrate ADL Tasks, 2-Verbalize Understanding, 3-

ImproveStrength/Promise


1=Demonstrate adherence to instructed precautions during ADL tasks.


2=Patient will verbalize/demonstrate understanding of assistive 

devices/modifications for ADL.


3=Patient will improve strength/tolerance for activity to enable patient to 

perform ADL's.





OT Education/Plan


Problem List/Assessment


Assessment:  Decreased Activ Tolerance, Decreased UE Strength, Dependent 

Transfers, Impaired Bed Mobility, Impaired Coordination, Impaired Funct Balance,

Impaired I ADL's, Impaired Self-Care Skills, Restricted Funct UE ROM, Visual-

Perceptual Deficit


Pt would benefit from skilled OT services in order to increase functional use of

RUE, improve independence and safety with ADLs and functional mobility, and for 

neuromuscular reeducation in order to maximize LOF for safe return home with 

spouse.





Discharge Recommendations


Plan/Recommendations:  Continue POC





Treatment Plan/Plan of Care


Patient would benefit from OT for education, treatment and training to promote 

independence in ADL's, mobility, safety and/or upper extremity function for 

ADL's.


Plan of Care:  ADL Retraining, Functional Mobility, UE Funct Exercise/Act, UE 

Neuromus Re-Ed/Coord


Treatment Duration:  Aug 12, 2022


Frequency:  5 times per week


Estimated Hrs Per Day:  .25 hour per day


Rehab Potential:  Poor





Time/GCodes


Start Time:  10:35


Stop Time:  10:58


Total Time Billed (hr/min):  23


Billed Treatment Time


1 visit-FA 2 (23 min)











NACHO SIMMS               Jul 20, 2022 12:02

## 2022-07-20 NOTE — PROGRESS NOTE - CARDIOLOGY
Cardiology SOAP Progress Note


Subjective:


In bed


Spouse at the bedside


No c/o CP





Objective:


I&O/Vital Signs











 7/21/22 7/21/22 7/21/22





 00:00 04:00 08:05


 


Temp 36.4 36.6 36.7


 


Pulse 88 100 109


 


Resp 18 18 22


 


B/P (MAP) 138/81 (100) 134/79 (97) 164/85 (111)


 


Pulse Ox 97 94 93


 


O2 Delivery High Flow N/C High Flow N/C High Flow N/C


 


O2 Flow Rate 6.00 6.00 6.00














 7/21/22





 00:00


 


Intake Total 1340 ml


 


Output Total 1100 ml


 


Balance 240 ml








Constitutional:  well-developed, other (non-verbal - nods head slowly in 

response to questions)


Respiratory:  No accessory muscle use, No respiratory distress; chest expansion 

is symmetric, chest is bilaterally symmetric, other (coarse breath sounds with 

productive cough)


Cardiovascular:  regular rate-rhythm; No JVD; tachycardia, S1 and S2


Gastrointestional:  No tender; soft, round, audible bowel sounds, other (PEG 

tube in place)


Extremities:  no lower extremity edema bilateral


Neurologic/Psychiatric:  other (RUE and RLE flaccid, facial droop right sided; 

LLE and LUE chronically weak 4/5; aphasic)


Skin:  No rash on exposed areas, No ulcerations on exposed areas





Results/Procedures:


Labs


Laboratory Tests


7/20/22 12:14: Glucometer 142H


7/20/22 17:31: Glucometer 146H


7/20/22 23:29: Glucometer 143H


7/21/22 05:17: Glucometer 145H


7/21/22 07:42: 


White Blood Count 11.6H, Red Blood Count 4.89, Hemoglobin 13.5, Hematocrit 42, 

Mean Corpuscular Volume 87, Mean Corpuscular Hemoglobin 28, Mean Corpuscular 

Hemoglobin Concent 32, Red Cell Distribution Width 13.4, Platelet Count 419H, 

Mean Platelet Volume 10.1, Immature Granulocyte % (Auto) 1, Neutrophils (%) 

(Auto) 69, Lymphocytes (%) (Auto) 15, Monocytes (%) (Auto) 11, Eosinophils (%) 

(Auto) 4, Basophils (%) (Auto) 1, Neutrophils # (Auto) 8.0H, Lymphocytes # 

(Auto) 1.8, Monocytes # (Auto) 1.3H, Eosinophils # (Auto) 0.4H, Basophils # 

(Auto) 0.1, Immature Granulocyte # (Auto) 0.1, Sodium Level 148H, Potassium 

Level 3.8, Chloride Level 104, Carbon Dioxide Level 27, Anion Gap 17H, Blood 

Urea Nitrogen 21H, Creatinine 0.73, Estimat Glomerular Filtration Rate 109, 

BUN/Creatinine Ratio 29, Glucose Level 141H, Calcium Level 9.2, Corrected 

Calcium 9.7, Total Bilirubin 0.5, Aspartate Amino Transf (AST/SGOT) 13, Alanine 

Aminotransferase (ALT/SGPT) 11, Alkaline Phosphatase 71, Total Protein 7.2, 

Albumin 3.4





Microbiology


7/12/22 MRSA Screen - Final, Complete


          MRSA not isolated








A/P:


Assessment:





CVA with right sided hemiparesis, aphasia, dysphagia


- reported h/o several CVA/TIA's following COVID infection in 2021 with left 

sided weakness - presents this time with righ sided hemip

aresis/aphasia/dysphagia


- The stroke team at  was consulted with no further recommendations other than

continuing aspirin and Plavix and obtaining an MRI


- Management per stroke team


- Follows with Dr. Ramirez of neurology services at [facility]


- Carotid u/s7-11-22:  Very limited exam. The right carotid artery shows no 

hemodynamic disease with minimal plaquing.  Left carotid system and vertebral 

arteries were not adequately evaluated due to patient's pain and position. 

Previous CT angiography of the neck on 07/09/2022 suggested


the carotid and vertebral arteries to be widely patent without significant 

stenosis


- ILR implanted on 7/12/22 to eval for occult A Fib as the source of his CVA





?Angioedema


- Lisinopril stopped





Fall at home





Sleep apnea


- non-complaint with sleep study





? aspiration


- S/P PEG tube placement on 7-14-22 by Dr. Olmedo





HTN


- currently controlled





Crohn's dz


- h/o colectomy





DM 2


Plan:





CVA


- management per stroke team - please see recs per [facility] as above


- source undetermined - implanted ILR 7-12-22 to r/o possible a-fib/flutter as 

cause. No significant arrhythmia seen so far 


S/P PEG tube placement 


BP still not well controlled


- increase Norvasc


Monitor lab from time to time and replenish electrolytes


Discharge planning in place - probable home with home health vs [facility]


Dr. Hilliard will be covering cardiology care











ROXANNE BRADY           Jul 20, 2022 10:51

## 2022-07-20 NOTE — PHYSICAL THERAPY DAILY NOTE
PT Daily Note-Current


Subjective


Patient and spouse agree to PT/OT cotreat due to patient's complete dependence 

for all mobility.





Mental Status


Attachments:  Oxygen, Nuñez Catheter


PT responsive and alert throughout treatment





Transfers


SCALE: Activities may be completed with or without assistive devices.





6-Indepedent-patient completes the activity by him/herself with no assistance 

from a helper.


5-Set-up or Clean-up Assistance-helper sets up or cleans up; patient completes 

activity. [place] assists only prior to or  


    following the activity.


4-Supervision or Touching Assistance-helper provides verbal cues and/or 

touching/steadying and/or contact guard assistance as patient completes 

activity. Assistance may be provided   


    throughout the activity or intermittently.


3-Partial/Moderate Assistance-helper does LESS THAN HALF the effort. [place] 

lifts, holds or supports trunk or limbs, but provides less than half the effort.


2-Substantial/Maximal Assistance-helper does MORE THAN HALF the effort. [place] 

lifts or holds trunk or limbs and provides more than half the effort.


0-Nonueithn-bsmick does ALL the effort. Patient does none of the effort to 

complete the activity. Or, the assistance of 2 or more helpers is required for 

the patient to complete the  


    activity.


If activity was not attempted, code reason:


7-Patient Refused.


9-Not Applicable-not attempted and the patient did not perform the activity 

before the current illness, exacerbation or injury.


10-Not Attempted due to Environmental Limitations-(lack of equipment, weather 

restraints, etc.).


88-Not Attempted due to Medical Conditions or Safety Concerns.


Roll Left & Right (QC):  1 (x3)


Lying to Sitting/Side of Bed(Q:  1 (x3)





Exercises


Supine Ex:  Ankle pumps, Hip abd/add


Supine Reps:  10 (dependent)





Treatments


Pt dependent x 3 to perform bed mobility. Pt then sits EOB w/ dep assist x 3 for

6 min.





Assessment


Current Status:  Fair Progress


Patient still dependent of several people for bed mobility and bathing.





PT Long Term Goals


Long Term Goals


PT Long Term Goals Time Frame:  Jul 18, 2022


Roll Left & Right (QC):  3


Sit to Lying (QC):  3


Lying-Sitting on Side/Bed(QC):  3


Sit to Stand (QC):  3


Chair/Bed-to-Chair Xfer(QC):  2





PT Plan


Problem List


Problem List:  Activity Tolerance, Functional Strength, Bed Mobility





Treatment/Plan


Treatment Plan:  Continue Plan of Care


Treatment Plan:  Bed Mobility, Education, Functional Activity Promsie, Functional 

Strength, Gait, Safety, Therapeutic Exercise, Transfers


Treatment Duration:  Jul 18, 2022


Frequency:  6 times per week


Estimated Hrs Per Day:  .25 hour per day


Patient and/or Family Agrees t:  Yes





Safety Risks/Education


Patient Education:  Correct Positioning


Teaching Recipient:  Patient, Family


Teaching Methods:  Discussion


Response to Teaching:  Return Demonstration





Time/GCodes


Time In:  1035


Time Out:  1058


Total Billed Treatment Time:  23


Total Billed Treatment


1, FA x 2











JOSE L MCCORMICK PTA                Jul 20, 2022 11:47

## 2022-07-20 NOTE — PROGRESS NOTE
Subjective


Subjective/Events-last exam


Patient denies any pain. Gives a thumbs up today. Tolerating tube feedings.


Review of Systems


Denies any pain





Objective


Exam


Last Set of Vital Signs





Vital Signs








  Date Time  Temp Pulse Resp B/P (MAP) Pulse Ox O2 Delivery O2 Flow Rate FiO2


 


22 15:27 36.5 101 20 158/93 (114) 95 High Flow N/C 6.00 


 


22 11:52        98





Capillary Refill : Less Than 3 SecondsLess Than 3 Seconds


I&O











Intake and Output 


 


 22





 00:00


 


Intake Total 1980 ml


 


Output Total 1725 ml


 


Balance 255 ml


 


 


 


Intake Oral 0 ml


 


Tube Feeding 1160 ml


 


Other 820 ml


 


Output Urine Total 1725 ml


 


# Bowel Movements 1








General:  Alert, No Acute Distress


Lungs:  Clear to Auscultation, Normal Air Movement


Heart:  Regular Rate, No Murmurs


Abdomen:  Soft, No Tenderness


Extremities:  Other (1+ pitting edema)





Results/Procedures


Lab


Laboratory Tests


22 17:12: Glucometer 149H


22 23:48: Glucometer 137H


22 05:19: Glucometer 125H


22 07:00: 


White Blood Count 9.9, Red Blood Count 4.92, Hemoglobin 13.4, Hematocrit 42, 

Mean Corpuscular Volume 85, Mean Corpuscular Hemoglobin 27, Mean Corpuscular 

Hemoglobin Concent 32, Red Cell Distribution Width 13.4, Platelet Count 426H, 

Mean Platelet Volume 10.1, Immature Granulocyte % (Auto) 0, Neutrophils (%) 

(Auto) 70, Lymphocytes (%) (Auto) 14, Monocytes (%) (Auto) 11, Eosinophils (%) 

(Auto) 4, Basophils (%) (Auto) 1, Neutrophils # (Auto) 6.9, Lymphocytes # (Auto)

1.4, Monocytes # (Auto) 1.1H, Eosinophils # (Auto) 0.4H, Basophils # (Auto) 0.1,

Immature Granulocyte # (Auto) 0.0, Sodium Level 146H, Potassium Level 3.5L, 

Chloride Level 105, Carbon Dioxide Level 27, Anion Gap 14, Blood Urea Nitrogen 

18, Creatinine 0.64, Estimat Glomerular Filtration Rate 114, BUN/Creatinine 

Ratio 28, Glucose Level 139H, Calcium Level 9.2, Corrected Calcium 9.7, Total 

Bilirubin 0.5, Aspartate Amino Transf (AST/SGOT) 13, Alanine Aminotransferase 

(ALT/SGPT) 8, Alkaline Phosphatase 69, Total Protein 7.2, Albumin 3.4


22 12:14: Glucometer 142H





Microbiology


22 MRSA Screen - Final, Complete


          MRSA not isolated


Radiology


[place], [place]





NAME:   MURRAY PICKETT


MED REC#:   [id number]


ACCOUNT#:   [id number]


PT STATUS:   REG ER


:   1970


PHYSICIAN:   JOHN OJEDA


ADMIT DATE:   22/ER


***Draft***


Date of Exam:22





CT HEAD WO-R/O STROKE








PROCEDURE: CT head w/o r/o stroke.





TECHNIQUE: Multiple contiguous axial images were obtained through


the brain without the use of intravenous contrast. Auto Exposure


Controls were utilized during the CT exam to meet ALARA standards


for radiation dose reduction. 





INDICATION: Stroke.





COMPARISON: Prior examination from 2022.





FINDINGS: The ventricles and sulci are within normal limits.


There are a few lacunar infarcts. There is no hydrocephalus.


There is no midline shift. There is no mass, hemorrhage or


extra-axial fluid collection. The calvarium is intact. Sinuses


and mastoid air cells are clear.





IMPRESSION: Atrophy and some mild chronic microvascular ischemic


disease with a few lacunar infarcts, particularly in the left


basal ganglia and to a lesser degree right basal ganglia. No


other acute intracranial abnormality. If there is high clinical


concern for an acute CVA further evaluation with MRI should be


considered. 





  Dictated on workstation # [id number]








Dict:   22


Trans:   22


[facility] [id number]





Interpreted by:     JEFF ARMSTRONG MD


Electronically signed by:NAME:   MURRAY PICKETT


MED REC#:   [id number]


ACCOUNT#:   [id number]


PT STATUS:   ADM IN


:   1970


PHYSICIAN:   MARCO A HERNADEZ DO


ADMIT DATE:   07/10/22/[facility]


                                   ***Draft***


Date of Exam:22





US CAROTID RUBÉN COMPLETE [id number]








PROCEDURE: 


US carotid duplex bilateral.





TECHNIQUE: 


Multiple Real-time grayscale images were obtained over the


carotid arteries in various projections bilaterally. Additional


spectral analysis and color Doppler duplex images were also


obtained.





INDICATION: 


Followup post-CVA.





FINDINGS: 


The left carotid system and vertebral system could not be


adequately interrogated due to patient position and pain.





Real-time imaging shows minimal atherosclerotic plaquing within


the right carotid bulb and internal and external carotid


arteries. Waveforms and peak velocities are normal with normal


carotid ratios.





IMPRESSION:


1. Very limited exam. The right carotid artery shows no


hemodynamic disease with minimal plaquing.


2. Left carotid system and vertebral arteries were not adequately


evaluated due to patient's pain and position.


3. Previous CT angiography of the neck on 2022 suggested


the carotid and vertebral arteries to be widely patent without


significant stenosis.





Parameters based on the consensus panel Gray-Scale and Doppler


ultrasound criteria published 


2003, Radiology, Volume 229. 





DOPPLER (peak systolic velocity M/S 


    


                Right    Left





CCA              1.12     1.33





ICA Proximal      .50     NOT SEEN





ICA Mid           .63     NOT SEEN


 


ICA Distal        .76     NOT SEEN





RATIO            .68     N/A





ECA              .93     NOT SEEN





VERT              NOT SEEN     NOT SEEN








  Dictated on workstation # [id number]








Dict:   22 1034


Trans:   22 1047


 [id number]





Interpreted by:     CUAUHTEMOC GOMEZ MD


Electronically signed by:





Assessment/Plan


Assessment/Plan





(1) Acute CVA (cerebrovascular accident)


Status:  Acute


Assessment & Plan:  : Extensive workup of cause of multiple TIAs/CVAs with 

unknown cause, cardiac workup also negative,  stroke team consulted upon 

arrival and patient continued on DAPT, severe dyphagia with PEG tube placed this

admission, PT/OT/speech


: order for hosp bed, home suction in preperation for home with 


: Waiting on [facility], they are checking with insurance regarding SNF





(2) Right hemiparesis


Status:  Acute


(3) Dysphagia due to recent cerebrovascular accident


Status:  Acute


Assessment & Plan:  - OT





(4) Aphasia due to acute cerebrovascular accident (CVA)


Status:  Acute


(5) Acute and chronic respiratory failure with hypoxia


Status:  Acute


Assessment & Plan:  : requiring HF oxygen, will titrate as tolerated, MAT 

protocol





(6) HTN (hypertension)


Status:  Chronic


Assessment & Plan:  : been normotensive, will restart home meds thru PEG 

tube


Qualifiers:  


   Qualified Codes:  I10 - Essential (primary) hypertension


(7) Diabetes mellitus


Status:  Chronic


Assessment & Plan:  : SSI


Qualifiers:  


   Qualified Codes:  E11.9 - Type 2 diabetes mellitus without complications


(8) S/P percutaneous endoscopic gastrostomy (PEG) tube placement


Assessment & Plan:  : Bolus feeding Glucerna 5 cans daily with 90 ml free 

water before and after each meal





(9) DVT prophylaxis


Status:  Acute


Assessment & Plan:  - SCDs, lovenox





(10) Discharge planning issues


Assessment & Plan:  : Planning for d/c home with HH and family caregivers, 

will need hospital bed, new oxygen start, suction














TASNEEM MOELLER MD               2022 16:05

## 2022-07-21 VITALS — SYSTOLIC BLOOD PRESSURE: 138 MMHG | DIASTOLIC BLOOD PRESSURE: 81 MMHG

## 2022-07-21 VITALS — SYSTOLIC BLOOD PRESSURE: 135 MMHG | DIASTOLIC BLOOD PRESSURE: 82 MMHG

## 2022-07-21 VITALS — SYSTOLIC BLOOD PRESSURE: 134 MMHG | DIASTOLIC BLOOD PRESSURE: 79 MMHG

## 2022-07-21 VITALS — DIASTOLIC BLOOD PRESSURE: 85 MMHG | SYSTOLIC BLOOD PRESSURE: 164 MMHG

## 2022-07-21 VITALS — SYSTOLIC BLOOD PRESSURE: 138 MMHG | DIASTOLIC BLOOD PRESSURE: 83 MMHG

## 2022-07-21 VITALS — SYSTOLIC BLOOD PRESSURE: 148 MMHG | DIASTOLIC BLOOD PRESSURE: 83 MMHG

## 2022-07-21 LAB
ALBUMIN SERPL-MCNC: 3.4 GM/DL (ref 3.2–4.5)
ALP SERPL-CCNC: 71 U/L (ref 40–136)
ALT SERPL-CCNC: 11 U/L (ref 0–55)
BASOPHILS # BLD AUTO: 0.1 10^3/UL (ref 0–0.1)
BASOPHILS NFR BLD AUTO: 1 % (ref 0–10)
BILIRUB SERPL-MCNC: 0.5 MG/DL (ref 0.1–1)
BUN/CREAT SERPL: 29
CALCIUM SERPL-MCNC: 9.2 MG/DL (ref 8.5–10.1)
CHLORIDE SERPL-SCNC: 104 MMOL/L (ref 98–107)
CO2 SERPL-SCNC: 27 MMOL/L (ref 21–32)
CREAT SERPL-MCNC: 0.73 MG/DL (ref 0.6–1.3)
EOSINOPHIL # BLD AUTO: 0.4 10^3/UL (ref 0–0.3)
EOSINOPHIL NFR BLD AUTO: 4 % (ref 0–10)
GFR SERPLBLD BASED ON 1.73 SQ M-ARVRAT: 109 ML/MIN
GLUCOSE SERPL-MCNC: 141 MG/DL (ref 70–105)
HCT VFR BLD CALC: 42 % (ref 40–54)
HGB BLD-MCNC: 13.5 G/DL (ref 13.3–17.7)
LYMPHOCYTES # BLD AUTO: 1.8 10^3/UL (ref 1–4)
LYMPHOCYTES NFR BLD AUTO: 15 % (ref 12–44)
MANUAL DIFFERENTIAL PERFORMED BLD QL: NO
MCH RBC QN AUTO: 28 PG (ref 25–34)
MCHC RBC AUTO-ENTMCNC: 32 G/DL (ref 32–36)
MCV RBC AUTO: 87 FL (ref 80–99)
MONOCYTES # BLD AUTO: 1.3 10^3/UL (ref 0–1)
MONOCYTES NFR BLD AUTO: 11 % (ref 0–12)
NEUTROPHILS # BLD AUTO: 8 10^3/UL (ref 1.8–7.8)
NEUTROPHILS NFR BLD AUTO: 69 % (ref 42–75)
PLATELET # BLD: 419 10^3/UL (ref 130–400)
PMV BLD AUTO: 10.1 FL (ref 9–12.2)
POTASSIUM SERPL-SCNC: 3.8 MMOL/L (ref 3.6–5)
PROT SERPL-MCNC: 7.2 GM/DL (ref 6.4–8.2)
SODIUM SERPL-SCNC: 148 MMOL/L (ref 135–145)
WBC # BLD AUTO: 11.6 10^3/UL (ref 4.3–11)

## 2022-07-21 RX ADMIN — INSULIN ASPART SCH UNIT: 100 INJECTION, SOLUTION INTRAVENOUS; SUBCUTANEOUS at 05:55

## 2022-07-21 RX ADMIN — INSULIN ASPART SCH UNIT: 100 INJECTION, SOLUTION INTRAVENOUS; SUBCUTANEOUS at 12:02

## 2022-07-21 RX ADMIN — MICONAZOLE NITRATE SCH APPLIC: 20 POWDER TOPICAL at 09:24

## 2022-07-21 RX ADMIN — METOPROLOL TARTRATE SCH MG: 50 TABLET, FILM COATED ORAL at 09:23

## 2022-07-21 RX ADMIN — ENOXAPARIN SODIUM SCH MG: 100 INJECTION SUBCUTANEOUS at 12:10

## 2022-07-21 RX ADMIN — METOPROLOL TARTRATE SCH MG: 50 TABLET, FILM COATED ORAL at 21:06

## 2022-07-21 RX ADMIN — AMLODIPINE BESYLATE SCH MG: 5 TABLET ORAL at 09:23

## 2022-07-21 RX ADMIN — CLOPIDOGREL BISULFATE SCH MG: 75 TABLET, FILM COATED ORAL at 09:24

## 2022-07-21 RX ADMIN — NYSTATIN SCH GM: 100000 CREAM TOPICAL at 12:10

## 2022-07-21 RX ADMIN — ASPIRIN SCH MG: 81 TABLET ORAL at 09:24

## 2022-07-21 RX ADMIN — MICONAZOLE NITRATE SCH APPLIC: 20 POWDER TOPICAL at 21:06

## 2022-07-21 RX ADMIN — FAMOTIDINE SCH MG: 20 TABLET, FILM COATED ORAL at 09:23

## 2022-07-21 RX ADMIN — NYSTATIN SCH GM: 100000 CREAM TOPICAL at 09:25

## 2022-07-21 RX ADMIN — INSULIN ASPART SCH UNIT: 100 INJECTION, SOLUTION INTRAVENOUS; SUBCUTANEOUS at 18:35

## 2022-07-21 RX ADMIN — ENOXAPARIN SODIUM SCH MG: 100 INJECTION SUBCUTANEOUS at 00:25

## 2022-07-21 RX ADMIN — NYSTATIN SCH GM: 100000 CREAM TOPICAL at 21:06

## 2022-07-21 RX ADMIN — EMPAGLIFLOZIN SCH MG: 10 TABLET, FILM COATED ORAL at 21:05

## 2022-07-21 RX ADMIN — FAMOTIDINE SCH MG: 20 TABLET, FILM COATED ORAL at 21:05

## 2022-07-21 NOTE — OCCUPATIONAL THER DAILY NOTE
OT Current Status-Daily Note


Subjective


Pt alert, lying in bed.  Pt gave thumbs up for feeling good.  Wife present in 

room.  Pt agrees to therapy, verbalized 'yes'.





Mental Status/Objective


Patient Orientation:  Person, Place, Non-Verbal/Aphasic, Time, Situation


Attachments:  Nuñez Catheter, IV, Oxygen, PEG Tube





ADL-Treatment


Therapy Code Descriptions/Definitions 





Functional [place] Measure:


0=Not Assessed/NA        4=Minimal Assistance


1=Total Assistance        5=Supervision or Setup


2=Maximal Assistance  6=Modified [place]


3=Moderate Assistance 7=Complete IndependenceSCALE: Activities may be completed 

with or without assistive devices.





6-Indepedent-patient completes the activity by him/herself with no assistance 

from a helper.


5-Set-up or Clean-up Assistance-helper sets up or cleans up; patient completes 

activity. [place] assists only prior to or  


    following the activity.


4-Supervision or Touching Assistance-helper provides verbal cues and/or 

touching/steadying and/or contact guard assistance as patient completes 

activity. Assistance may be provided   


    throughout the activity or intermittently.


3-Partial/Moderate Assistance-helper does LESS THAN HALF the effort. [place] 

lifts, holds or supports trunk or limbs, but provides less than half the effort.


2-Substantial/Maximal Assistance-helper does MORE THAN HALF the effort. [place] 

lifts or holds trunk or limbs and provides more than half the effort.


1-Rspudavcv-uusmli does ALL the effort. Patient does none of the effort to 

complete the activity. Or, the assistance of 2 or more helpers is required for 

the patient to complete the  


    activity.


If activity was not attempted, code reason:


7-Patient Refused.


9-Not Applicable-not attempted and the patient did not perform the activity 

before the current illness, exacerbation or injury.


10-Not Attempted due to Environmental Limitations-(lack of equipment, weather 

restraints, etc.).


88-Not Attempted due to Medical Conditions or Safety Concerns.





Other Treatment


Dependent with rolling though pt will reach with L UE and assist with rolling 

back and forth.  Terrence lift completed from bed to recliner.  Pt positioned for 

comfort in recliner.  After therapy, pt sitting in recliner and PT working with 

pt.  All needs met in room.





OT Long Term Goals


Long Term Goals


Time Frame:  Aug 12, 2022


Eating (QC):  3


Toileting Hygiene (QC):  3


Shower/Bathe Self (QC):  2


Upper Body Dressing (QC):  3


Lower Body Dressing (QC):  2


On/Off Footwear (QC):  2


Additional Goals:  1-Demonstrate ADL Tasks, 2-Verbalize Understanding, 3-

ImproveStrength/Promise


1=Demonstrate adherence to instructed precautions during ADL tasks.


2=Patient will verbalize/demonstrate understanding of assistive 

devices/modifications for ADL.


3=Patient will improve strength/tolerance for activity to enable patient to 

perform ADL's.





OT Education/Plan


Problem List/Assessment


Assessment:  Decreased Activ Tolerance, Decreased UE Strength, Dependent 

Transfers, Impaired Bed Mobility, Impaired Coordination, Impaired I ADL's, 

Impaired Self-Care Skills, Restricted Funct UE ROM, Visual-Perceptual Deficit


Pt would benefit from skilled OT services in order to increase functional use of

RUE, improve independence and safety with ADLs and functional mobility, and for 

neuromuscular reeducation in order to maximize LOF for safe return home with 

spouse.





Discharge Recommendations


Plan/Recommendations:  Continue POC





Treatment Plan/Plan of Care


Patient would benefit from OT for education, treatment and training to promote 

independence in ADL's, mobility, safety and/or upper extremity function for 

ADL's.


Plan of Care:  ADL Retraining, Functional Mobility, UE Funct Exercise/Act, UE 

Neuromus Re-Ed/Coord


Treatment Duration:  Aug 12, 2022


Frequency:  5 times per week


Estimated Hrs Per Day:  .25 hour per day


Rehab Potential:  Poor





Time/GCodes


Start Time:  10:50


Stop Time:  11:19


Total Time Billed (hr/min):  29


Billed Treatment Time


1 visit-FA 2 (29 min)











NACHO SIMMS               Jul 21, 2022 12:18

## 2022-07-21 NOTE — PHYSICAL THERAPY DAILY NOTE
PT Daily Note-Current


Subjective


Patient lying supine in bed upon PT arrival, wife in the room, agreeable to 

treatment.





Mental Status


Patient Orientation:  Non-Verbal/Aphasic


Attachments:  Oxygen, Nuñez Catheter, IV





Transfers


SCALE: Activities may be completed with or without assistive devices.





6-Indepedent-patient completes the activity by him/herself with no assistance 

from a helper.


5-Set-up or Clean-up Assistance-helper sets up or cleans up; patient completes 

activity. [place] assists only prior to or  


    following the activity.


4-Supervision or Touching Assistance-helper provides verbal cues and/or 

touching/steadying and/or contact guard assistance as patient completes 

activity. Assistance may be provided   


    throughout the activity or intermittently.


3-Partial/Moderate Assistance-helper does LESS THAN HALF the effort. [place] 

lifts, holds or supports trunk or limbs, but provides less than half the effort.


2-Substantial/Maximal Assistance-helper does MORE THAN HALF the effort. [place] 

lifts or holds trunk or limbs and provides more than half the effort.


2-Aendoceqr-xkgfzj does ALL the effort. Patient does none of the effort to 

complete the activity. Or, the assistance of 2 or more helpers is required for 

the patient to complete the  


    activity.


If activity was not attempted, code reason:


7-Patient Refused.


9-Not Applicable-not attempted and the patient did not perform the activity b

efore the current illness, exacerbation or injury.


10-Not Attempted due to Environmental Limitations-(lack of equipment, weather 

restraints, etc.).


88-Not Attempted due to Medical Conditions or Safety Concerns.


Roll Left & Right (QC):  1


Chair/Bed-to-Chair Xfer(QC):  1


Patient required total assistance x 2 for rolling in bed for sling placement.  

Ceiling lift for bed to chair transfer.





Gait Training


Does the Patient Walk?:  No and Walking Goal NOT indicated





Exercises


Seated Therapy Exercises:  Ankle pumps, Long arc quads, Hip flexion, Hamstring 

Curls, Hip abd/add


Seated Reps:  10


Patient performs sitting LE ther ex as listed above.  Right LE mostly PROM, 

however very faint muscle contractions noted in hamstring curls.  Left LE 

AROM/AAROM for all with repetitions ranging from 10-20 depending on patient 

tolerance.





Assessment


Current Status:  Poor Progress


Patient required total assistance x 2 for rolling in bed for sling placement.  

Ceiling lift for bed to chair transfer.  Patient performs sitting LE ther ex as 

listed above.  Right LE mostly PROM, however very faint muscle contractions 

noted in hamstring curls.  Left LE AROM/AAROM for all with repetitions ranging 

from 10-20 depending on patient tolerance.  Patient in chair post treatment with

all needs met, nursing notified, call light in reach and wife in the room.





PT Long Term Goals


Long Term Goals


PT Long Term Goals Time Frame:  Jul 18, 2022


Roll Left & Right (QC):  3


Sit to Lying (QC):  3


Lying-Sitting on Side/Bed(QC):  3


Sit to Stand (QC):  3


Chair/Bed-to-Chair Xfer(QC):  2





PT Plan


Treatment/Plan


Treatment Plan:  Continue Plan of Care


Treatment Plan:  Bed Mobility, Education, Functional Activity Promise, Functional 

Strength, Gait, Safety, Therapeutic Exercise, Transfers


Treatment Duration:  Jul 18, 2022


Frequency:  6 times per week


Estimated Hrs Per Day:  .25 hour per day


Patient and/or Family Agrees t:  Yes





Safety Risks/Education


Patient Education:  Transfer Techniques, Reviewed Precautions, Safety Issues


Teaching Recipient:  Patient, Family


Teaching Methods:  Demonstration, Discussion


Response to Teaching:  Reinforcement Needed





Time/GCodes


Time In:  1054


Time Out:  1126


Total Billed Treatment Time:  32


Total Billed Treatment


Visit, FA, EX











IMAN FLOYD PT                Jul 21, 2022 13:38

## 2022-07-22 VITALS — DIASTOLIC BLOOD PRESSURE: 85 MMHG | SYSTOLIC BLOOD PRESSURE: 130 MMHG

## 2022-07-22 VITALS — SYSTOLIC BLOOD PRESSURE: 139 MMHG | DIASTOLIC BLOOD PRESSURE: 81 MMHG

## 2022-07-22 VITALS — SYSTOLIC BLOOD PRESSURE: 164 MMHG | DIASTOLIC BLOOD PRESSURE: 85 MMHG

## 2022-07-22 VITALS — SYSTOLIC BLOOD PRESSURE: 136 MMHG | DIASTOLIC BLOOD PRESSURE: 74 MMHG

## 2022-07-22 VITALS — SYSTOLIC BLOOD PRESSURE: 153 MMHG | DIASTOLIC BLOOD PRESSURE: 92 MMHG

## 2022-07-22 VITALS — DIASTOLIC BLOOD PRESSURE: 72 MMHG | SYSTOLIC BLOOD PRESSURE: 134 MMHG

## 2022-07-22 VITALS — SYSTOLIC BLOOD PRESSURE: 185 MMHG | DIASTOLIC BLOOD PRESSURE: 89 MMHG

## 2022-07-22 LAB
ALBUMIN SERPL-MCNC: 3.4 GM/DL (ref 3.2–4.5)
ALP SERPL-CCNC: 73 U/L (ref 40–136)
ALT SERPL-CCNC: 12 U/L (ref 0–55)
BASOPHILS # BLD AUTO: 0.1 10^3/UL (ref 0–0.1)
BASOPHILS NFR BLD AUTO: 1 % (ref 0–10)
BILIRUB SERPL-MCNC: 0.6 MG/DL (ref 0.1–1)
BUN/CREAT SERPL: 30
CALCIUM SERPL-MCNC: 9.3 MG/DL (ref 8.5–10.1)
CHLORIDE SERPL-SCNC: 105 MMOL/L (ref 98–107)
CO2 SERPL-SCNC: 27 MMOL/L (ref 21–32)
CREAT SERPL-MCNC: 0.8 MG/DL (ref 0.6–1.3)
EOSINOPHIL # BLD AUTO: 0.2 10^3/UL (ref 0–0.3)
EOSINOPHIL NFR BLD AUTO: 2 % (ref 0–10)
GFR SERPLBLD BASED ON 1.73 SQ M-ARVRAT: 106 ML/MIN
GLUCOSE SERPL-MCNC: 155 MG/DL (ref 70–105)
HCT VFR BLD CALC: 43 % (ref 40–54)
HGB BLD-MCNC: 13.4 G/DL (ref 13.3–17.7)
LYMPHOCYTES # BLD AUTO: 1.7 10^3/UL (ref 1–4)
LYMPHOCYTES NFR BLD AUTO: 15 % (ref 12–44)
MANUAL DIFFERENTIAL PERFORMED BLD QL: NO
MCH RBC QN AUTO: 27 PG (ref 25–34)
MCHC RBC AUTO-ENTMCNC: 31 G/DL (ref 32–36)
MCV RBC AUTO: 87 FL (ref 80–99)
MONOCYTES # BLD AUTO: 1.4 10^3/UL (ref 0–1)
MONOCYTES NFR BLD AUTO: 12 % (ref 0–12)
NEUTROPHILS # BLD AUTO: 8.3 10^3/UL (ref 1.8–7.8)
NEUTROPHILS NFR BLD AUTO: 71 % (ref 42–75)
PLATELET # BLD: 398 10^3/UL (ref 130–400)
PMV BLD AUTO: 10.6 FL (ref 9–12.2)
POTASSIUM SERPL-SCNC: 3.9 MMOL/L (ref 3.6–5)
PROT SERPL-MCNC: 7.2 GM/DL (ref 6.4–8.2)
SODIUM SERPL-SCNC: 150 MMOL/L (ref 135–145)
WBC # BLD AUTO: 11.7 10^3/UL (ref 4.3–11)

## 2022-07-22 RX ADMIN — AMLODIPINE BESYLATE SCH MG: 5 TABLET ORAL at 08:30

## 2022-07-22 RX ADMIN — NYSTATIN SCH GM: 100000 CREAM TOPICAL at 12:01

## 2022-07-22 RX ADMIN — FAMOTIDINE SCH MG: 20 TABLET, FILM COATED ORAL at 21:10

## 2022-07-22 RX ADMIN — MICONAZOLE NITRATE SCH APPLIC: 20 POWDER TOPICAL at 08:31

## 2022-07-22 RX ADMIN — ENOXAPARIN SODIUM SCH MG: 100 INJECTION SUBCUTANEOUS at 01:13

## 2022-07-22 RX ADMIN — INSULIN ASPART SCH UNIT: 100 INJECTION, SOLUTION INTRAVENOUS; SUBCUTANEOUS at 00:22

## 2022-07-22 RX ADMIN — ENOXAPARIN SODIUM SCH MG: 100 INJECTION SUBCUTANEOUS at 12:00

## 2022-07-22 RX ADMIN — HYDROCODONE BITARTRATE AND ACETAMINOPHEN PRN EA: 5; 325 TABLET ORAL at 08:30

## 2022-07-22 RX ADMIN — INSULIN ASPART SCH UNIT: 100 INJECTION, SOLUTION INTRAVENOUS; SUBCUTANEOUS at 05:47

## 2022-07-22 RX ADMIN — INSULIN ASPART SCH UNIT: 100 INJECTION, SOLUTION INTRAVENOUS; SUBCUTANEOUS at 11:21

## 2022-07-22 RX ADMIN — MICONAZOLE NITRATE SCH APPLIC: 20 POWDER TOPICAL at 21:11

## 2022-07-22 RX ADMIN — INSULIN ASPART SCH UNIT: 100 INJECTION, SOLUTION INTRAVENOUS; SUBCUTANEOUS at 17:35

## 2022-07-22 RX ADMIN — FAMOTIDINE SCH MG: 20 TABLET, FILM COATED ORAL at 08:30

## 2022-07-22 RX ADMIN — HYDROCODONE BITARTRATE AND ACETAMINOPHEN PRN EA: 5; 325 TABLET ORAL at 21:10

## 2022-07-22 RX ADMIN — ASPIRIN SCH MG: 81 TABLET ORAL at 08:30

## 2022-07-22 RX ADMIN — CLOPIDOGREL BISULFATE SCH MG: 75 TABLET, FILM COATED ORAL at 08:30

## 2022-07-22 RX ADMIN — EMPAGLIFLOZIN SCH MG: 10 TABLET, FILM COATED ORAL at 21:10

## 2022-07-22 RX ADMIN — METOPROLOL TARTRATE SCH MG: 50 TABLET, FILM COATED ORAL at 08:30

## 2022-07-22 RX ADMIN — NYSTATIN SCH GM: 100000 CREAM TOPICAL at 08:31

## 2022-07-22 RX ADMIN — NYSTATIN SCH GM: 100000 CREAM TOPICAL at 21:11

## 2022-07-22 RX ADMIN — METOPROLOL TARTRATE SCH MG: 50 TABLET, FILM COATED ORAL at 21:10

## 2022-07-22 NOTE — OCCUPATIONAL THER DAILY NOTE
OT Current Status-Daily Note


Subjective


Pt's wife initially asked therapy to hold off today, but pt indicated through 

gestures he would like to get up to chair. Pt, nurse, and pt's wife agreeable to

OT Tx,





ADL-Treatment


Therapy Code Descriptions/Definitions 





Functional [place] Measure:


0=Not Assessed/NA        4=Minimal Assistance


1=Total Assistance        5=Supervision or Setup


2=Maximal Assistance  6=Modified [place]


3=Moderate Assistance 7=Complete IndependenceSCALE: Activities may be completed 

with or without assistive devices.





6-Indepedent-patient completes the activity by him/herself with no assistance 

from a helper.


5-Set-up or Clean-up Assistance-helper sets up or cleans up; patient completes 

activity. [place] assists only prior to or  


    following the activity.


4-Supervision or Touching Assistance-helper provides verbal cues and/or 

touching/steadying and/or contact guard assistance as patient completes 

activity. Assistance may be provided   


    throughout the activity or intermittently.


3-Partial/Moderate Assistance-helper does LESS THAN HALF the effort. [place] 

lifts, holds or supports trunk or limbs, but provides less than half the effort.


2-Substantial/Maximal Assistance-helper does MORE THAN HALF the effort. [place] 

lifts or holds trunk or limbs and provides more than half the effort.


9-Jyiyguham-bmldfx does ALL the effort. Patient does none of the effort to com

plete the activity. Or, the assistance of 2 or more helpers is required for the 

patient to complete the  


    activity.


If activity was not attempted, code reason:


7-Patient Refused.


9-Not Applicable-not attempted and the patient did not perform the activity 

before the current illness, exacerbation or injury.


10-Not Attempted due to Environmental Limitations-(lack of equipment, weather 

restraints, etc.).


88-Not Attempted due to Medical Conditions or Safety Concerns.


Oral Hygiene (QC):  88 (Peg tube)


On/Off Footwear:  1


Toileting Hygiene (QC):  1





Other Treatment


Pt rolled side to side in bed, total assist in order for pillows to be removed 

and nil[place] sling adjusted. Pt transferred to recliner via nil[place] lift. Pt 

positioned to comfort. Pt coughing throughout session, requiring assistance to 

use suction. Pt gestured with L hand throughout session, but required increased 

time with movements. Post tx, pt in recliner, call light in reach and all needs 

met, wife at bedside.





Education


OT Patient Education:  Correct positioning, Modified ADL techniques, Progress 

toward Goal/Update tx plan, Purpose of tx/functional activities, Rehab process


Teaching Recipient:  Patient


Teaching Methods:  Discussion





OT Long Term Goals


Long Term Goals


Time Frame:  Aug 12, 2022


Eating (QC):  3


Toileting Hygiene (QC):  3


Shower/Bathe Self (QC):  2


Upper Body Dressing (QC):  3


Lower Body Dressing (QC):  2


On/Off Footwear (QC):  2


Additional Goals:  1-Demonstrate ADL Tasks, 2-Verbalize Understanding, 3-

ImproveStrength/Promise


1=Demonstrate adherence to instructed precautions during ADL tasks.


2=Patient will verbalize/demonstrate understanding of assistive 

devices/modifications for ADL.


3=Patient will improve strength/tolerance for activity to enable patient to 

perform ADL's.





OT Education/Plan


Problem List/Assessment


Assessment:  Decreased Activ Tolerance, Decreased UE Strength, Dependent 

Transfers, Impaired Bed Mobility, Impaired Coordination, Impaired Funct Balance,

Impaired I ADL's, Impaired Self-Care Skills, Restricted Funct UE ROM


Pt would benefit from skilled OT services in order to increase functional use of

RUE, improve independence and safety with ADLs and functional mobility, and for 

neuromuscular reeducation in order to maximize LOF for safe return home with 

spouse.





Discharge Recommendations


Plan/Recommendations:  Continue POC





Treatment Plan/Plan of Care


Patient would benefit from OT for education, treatment and training to promote 

independence in ADL's, mobility, safety and/or upper extremity function for 

ADL's.


Plan of Care:  ADL Retraining, Functional Mobility, UE Funct Exercise/Act, UE 

Neuromus Re-Ed/Coord


Treatment Duration:  Aug 12, 2022


Frequency:  5 times per week


Estimated Hrs Per Day:  .25 hour per day


Rehab Potential:  Poor





Time/GCodes


Start Time:  13:30


Stop Time:  13:50


Total Time Billed (hr/min):  20


Billed Treatment Time


1, AVERY HOOD OT          Jul 22, 2022 13:55

## 2022-07-22 NOTE — OCC THERAPY PROGRESS NOTE
Therapy Progress Note


Therapy attempted 2x's this am to work with pt.  First attempt, wife declined 

stating that pt was sleeping and she did not want him disturbed.  2nd attempt 

nrsg in room monitoring pt's vitals.  Nrsg requested for therapy not work with 

pt at this time due to O2 sat levels.  Will attempt at next available time.











NACHO SIMMS               Jul 22, 2022 11:25

## 2022-07-22 NOTE — SPEECH THERAPY DAILY NOTE
Speech Daily Progress Note


Subjective


Date Seen by Provider:  Jul 22, 2022


Time Seen by Provider:  13:55


The patient was recently transferred from his bed to his recliner throughout 

therapy with physical therapy and occupational therapy. The patient is 

positioned upright in his recliner with his wife at bedside. The patient 

continues to display a head tilt to the left regardless of pillow positioning. 

The patient is agreeable to participation in the cognitive linguistic and 

dysphagia skilled treatment session with a thumbs up.





Objective


The patient displays wet, audible respirations at baseline. Suctioning is 

attempted by the patient's wife, however, the location of the secretions appear 

to be located in the hypopharyngeal and laryngeal regions. The patient is unable

to complete a forceful throat clear or cough to eject secretions to the oral 

cavity for suction. 





The patient continues to communicate with thumbs up or thumbs down. The 

clinician verbally prompts the patient for voicing attempts, however, voicing is

not achieved on this date. The patient intermittently does not respond to the 

clinician, requiring repetition of the instruction or direct modeling attempt.





The patient is provided ice chips by the clinician. The patient displays minimal

lingual and oral manipulation, reduced in comparison to prior sessions. 

Regardless of the clinician's verbal prompts to elicit a pharyngeal swallow, a 

pharyngeal swallow is not triggered by the patient and oral holding is 

displayed. Following oral holding, a swallow is triggered followed by an 

immediate, rigorous cough. The patient displays the same behavior with each ice 

chip provided.





The patient demonstrated an increasingly, acute redness to his upper chest, neck

and face region (including upper arms). The PCT was present at recorded 

temperature at 100.4. The patient's RN was requested, who presented to the room 

to care for the patient. Additionally, the patient's RN stated RT would be 

contacted for deep suctioning. At this time, the ST exited the room. The patient

was encouraged to continue attempts of swallowing his own saliva, as well as, 

attempts at strong coughs to clear hypopharyngeal and laryngeal secretions.





Assessment


Assessment Current Status:  Poor Progress





Treatment Plan


Continue Plan of Care





Speech Short Term Goals


Short Term Goals


Short Term Goals


1. The patient will tolerate the least restrictive consistency of P.O. trials 

for possible advancement of a P.O. diet consistency.





2. The patient will demonstrate intelligibility strategies with 50% accuracy and

maximum clinician verbal and visual cueing.


Time Frame-STG:  Three Weeks.





Speech Long Term Goals


Long Term Goals


1. The patient will tolerate the least restrictive consistency without s/s of 

suspected aspiration.





2. The patient will improve expressive communication for increased safety with 

discharge to the least restricted environment.


Time Frame:  One Week.





Speech-Plan


Treatment Plan


Speech Therapy Treatment Plan:  Continue Plan of Care


Treatment Duration:  Aug 1, 2022


Frequency:  4 times per week (Four to five times per week.)


Estimated Hrs Per Day:  .25 hour per day


Rehab Potential:  Poor





Safety Risks/Education


Teaching Recipient:  Patient, Significant Other


Teaching Methods:  Discussion


Response to Teaching:  Verbalize Understanding


Education Topics Provided:  


Results, Recommendations, Plan of Care





Time


Speech Therapy Time In:  13:55


Speech Therapy Time Out:  14:20


Total Billed Time:  25


Billed Treatment Time


1BETTIE DYST LOY, ELIZABETH ST               Jul 22, 2022 14:05

## 2022-07-22 NOTE — PROGRESS NOTE - HOSPITALIST
KRISTOPHERJose LuisRADHA WINN 7/22/22 1249:


Subjective


HPI/CC On Admission


Date Seen by Provider:  Jul 22, 2022


Time Seen by Provider:  11:00


Patient is a 52yo male, h/o COVID about 1.5y ago and multiple neurologic insults

during hospitalization for Covid at that time (TIA vs strokes).  He has had 

chronic debility since then with several ED visits for weakness and stroke like 

symptoms.  Apparently his sons were at home with him and he had a fall in the 

bathroom with increased weakness to the left side.  Unsure of LOC as sons are 

not here at this time for history.  The patient (with significantly slurred 

speech) is able to tell me that his wife is working in [place] - which is 

consistent with prior visits to our ER.  He does state he has a mild headache.  

Otherwise his speech is so slurred he is very difficult to understand.


Upon my arrival patient exhibited right neglect tongue was sticking out mildly 

swollen without erythema.  The patient is eyes were open and did orient to voice

he was slow to respond and very dysarthric unintelligible speech.  He was able 

to follow simple commands but when asked to use his right hand to touch my 

finger he only moved his left hand and was able to do so with good control of 

the left hand.  His wife came later in the day and reports that he had been 

seeing neurologist Dr. Loja had a rather extensive work-up was started on 

aspirin and Plavix but no apparent large vessel disease was noted.  He had an 

echocardiogram gram and there is been no evidence for atrial fibrillation.  He 

was having no neurologic issues previous to COVID infection in January 2021.  He

had been doing well at home until the night of his admission when his right leg 

apparently gave out he stumbled forward they helped him to the bed and again 

noted that he was dysarthric and his tongue appeared to be swollen.  There were 

no rash issues reported he was confused but there was no loss of consciousness 

and no reported pallor.  He had been feeling well up until that point.


Subjective/Events-last exam


Brief Hospital Course:


Thomas Watts is a 50 yo male with past medical history of Crohns, T2DM, Covid 1.5

years ago followed by multiple TIA/strokes, and chronic debility who was ad

mitted to [facility] on 7/9 after experiencing stroke like symptoms 

and tongue swelling. An extensive stroke workup including carotid ultrasound, 

cardiac workup with loop recorder placement, CT head, and CTA revealed CVA with 

unknown cause. He required vapotherm when first admitted, however O2 was 

titrated to 6L high flow nasal canula. Due to severe dysphagia he was unable to 

take any food by mouth and a PEG tube was placed successfully. He has been on 

dual antiplatelet therapy since his admission. His T2DM has been well controlled

on Empagliflozin 10mg and SSI. He will be discharged home in stable condition 

with home health and family caregivers.





Today: Patient reports no acute events overnight. He denies any pain, but 

occasionally has difficulty breathing.





Review of Systems


General:  No Chills, No Night Sweats


HEENT:  No Head Aches, No Visual Changes


Pulmonary:  No Cough, No Pleuritic Chest Pain


Cardiovascular:  No: Chest Pain, Palpitations


Gastrointestinal:  No: Nausea, Vomiting, Abdominal Pain


Genitourinary:  No Frequency


Neurological:  Weakness, Numbness





Objective


Exam


Vital Signs





Vital Signs








  Date Time  Temp Pulse Resp B/P (MAP) Pulse Ox O2 Delivery O2 Flow Rate FiO2


 


7/22/22 11:44 37.2 93 20 134/72 (92) 92 High Flow N/C 10.00 


 


7/19/22 11:52        98





Capillary Refill : Less Than 3 SecondsLess Than 3 Seconds


General Appearance:  No No Apparent Distress; Chronically ill


HEENT:  Moist Mucous Membranes


Neck:  Non Tender, Supple


Respiratory:  Chest Non Tender, Lungs Clear, No Accessory Muscle Use


Cardiovascular:  Regular Rate, Rhythm, No JVD


Gastrointestinal:  Normal Bowel Sounds, Soft


Extremity:  Normal Capillary Refill, Non Tender


Neurologic/Psychiatric:  Alert


Skin:  Normal Color, Warm/Dry


Lymphatic:  No Adenopathy





Results/Procedures


Lab


Laboratory Tests


7/22/22 04:48








Patient resulted labs reviewed.





Assessment/Plan


Assessment and Plan


Assess & Plan/Chief Complaint


1) Acute CVA with Right hemiparesis and dysarthria


Acute respiratory distress requiring supplemental oxygen via Vapotherm


Angioedema of tongue


   -Carotid ultrasound unremarkable


   -Cardiology consulted, appreciate their recommendations


   -CT head showed atrophy and chronic ischemic microvascular changes with lunar

infarcts in Left basal ganglia


   -CTA showed no large vessel occlusion


   -MRI recommended, but couldn't be done d/t body habitus


   -Speech therapy, Physical therapy and Occupational therapy


   -PEG tube placed





2) Acute respiratory distress requiring supplemental oxygen


   -Stable on 6L high flow nasal cannula





3) Angioedema of tongue


   -Hold lisinopril


   -Speech therapy advised NPO





4) HTN


   - Well controlled on Amplodipine 10 and Metoprolol 100 BID





5) Diabetes


   -SSI


   -Empagliflozin 10mg





Diet-NPO, PEG tube


DVT prophylaxis- SCDs and Lovenox





Dispo: Discharge home with home health and family caregivers. Social workers 

working on home hospital bed.





GILDA HERNADEZ DO 7/22/22 2054:


Subjective


Subjective/Events-last exam


Pt is about the same


Ordered a chest x-ray due to thick secretions


Overall appears to be very debilitated


Changed their mind and wants to go home health which seems to be very unrealist

ic


Overall having no new issues


Chest x-ray did show right sided atelectasis





Objective


Exam


General Appearance:  No Apparent Distress, WD/WN, Chronically ill, Obese


Respiratory:  No Accessory Muscle Use, Decreased Breath Sounds


Cardiovascular:  Regular Rate, Rhythm


Neurologic/Psychiatric:  Alert, Depressed Affect, Motor Weakness





Assessment/Plan


Assessment and Plan


Assess & Plan/Chief Complaint


Poor prognosis


High risk for PNA





Supervisory-Addendum Brief


Verification & Attestation


Participated in pt care:  history, MDM, physical


Personally performed:  exam, history, MDM, supervision of care


Care discussed with:  Medical Student


Procedures:  n/a


Results interpretation:  Verified all documentation


Verification and Attestation of Medical Student E/M Service





A medical student performed and documented this service in my presence. I 

reviewed and verified all information documented by the medical student and made

modifications to such information, when appropriate. I personally performed the 

physical exam and medical decision making. 





 Gilda Hernadez, Jul 22, 2022,20:53











RADHA WHITLOCK           Jul 22, 2022 12:49


GILDA HERNADEZ DO                Jul 22, 2022 20:54

## 2022-07-22 NOTE — PHYSICAL THERAPY DAILY NOTE
PT Daily Note-Current


Subjective


Patient lying supine in bed upon PT arrival, wife initially declines treatment, 

however patient gives the thumbs up in agreement.





Transfers


SCALE: Activities may be completed with or without assistive devices.





6-Indepedent-patient completes the activity by him/herself with no assistance 

from a helper.


5-Set-up or Clean-up Assistance-helper sets up or cleans up; patient completes 

activity. Gilbert assists only prior to or  


    following the activity.


4-Supervision or Touching Assistance-helper provides verbal cues and/or 

touching/steadying and/or contact guard assistance as patient completes 

activity. Assistance may be provided   


    throughout the activity or intermittently.


3-Partial/Moderate Assistance-helper does LESS THAN HALF the effort. Gilbert 

lifts, holds or supports trunk or limbs, but provides less than half the effort.


2-Substantial/Maximal Assistance-helper does MORE THAN HALF the effort. Gilbert 

lifts or holds trunk or limbs and provides more than half the effort.


2-Foteqetjj-iimyjc does ALL the effort. Patient does none of the effort to 

complete the activity. Or, the assistance of 2 or more helpers is required for 

the patient to complete the  


    activity.


If activity was not attempted, code reason:


7-Patient Refused.


9-Not Applicable-not attempted and the patient did not perform the activity 

before the current illness, exacerbation or injury.


10-Not Attempted due to Environmental Limitations-(lack of equipment, weather 

restraints, etc.).


88-Not Attempted due to Medical Conditions or Safety Concerns.


Roll Left & Right (QC):  1


Chair/Bed-to-Chair Xfer(QC):  1





Gait Training


Does the Patient Walk?:  No and Walking Goal NOT indicated





Assessment


Current Status:  Poor Progress


Patient tolerated treatment poorly compared to last PT treatment.  He 

demonstrates increased coughing and required suctioning x 2.  Patient required 

total assistance for all bed mobility and nilton transfer to chair.  Patient in 

chair post treatment with all needs met, nursing notified, wife in the room, 

call light in hand.





PT Long Term Goals


Long Term Goals


PT Long Term Goals Time Frame:  Jul 18, 2022


Roll Left & Right (QC):  3


Sit to Lying (QC):  3


Lying-Sitting on Side/Bed(QC):  3


Sit to Stand (QC):  3


Chair/Bed-to-Chair Xfer(QC):  2





PT Plan


Treatment/Plan


Treatment Plan:  Continue Plan of Care


Treatment Plan:  Bed Mobility, Education, Functional Activity Promise, Functional 

Strength, Gait, Safety, Therapeutic Exercise, Transfers


Treatment Duration:  Jul 18, 2022


Frequency:  6 times per week


Estimated Hrs Per Day:  .25 hour per day


Patient and/or Family Agrees t:  Yes





Safety Risks/Education


Patient Education:  Transfer Techniques


Teaching Recipient:  Patient, Family


Teaching Methods:  Demonstration, Discussion


Response to Teaching:  Reinforcement Needed





Time/GCodes


Time In:  1330


Time Out:  1350


Total Billed Treatment Time:  20


Total Billed Treatment


Visit, IMAN DORANTES PT                Jul 22, 2022 14:15

## 2022-07-22 NOTE — DIAGNOSTIC IMAGING REPORT
INDICATION: Pneumonia.



TIME OF EXAM: 12:41 p.m.



COMPARISON: Correlation is made with prior chest 07/13/2022.



FINDINGS: Heart is enlarged. Cardiac loop recorder overlies the

lower left chest. There is some mild subsegmental atelectasis at

the right base. Otherwise, lungs are clear. There is no failure.

No effusion or pneumothorax is detected.



IMPRESSION: Cardiomegaly with subsegmental atelectasis at the

right lung base.



Dictated by: 



  Dictated on workstation # [id number]

## 2022-07-23 VITALS — DIASTOLIC BLOOD PRESSURE: 102 MMHG | SYSTOLIC BLOOD PRESSURE: 168 MMHG

## 2022-07-23 VITALS — SYSTOLIC BLOOD PRESSURE: 146 MMHG | DIASTOLIC BLOOD PRESSURE: 83 MMHG

## 2022-07-23 VITALS — DIASTOLIC BLOOD PRESSURE: 84 MMHG | SYSTOLIC BLOOD PRESSURE: 150 MMHG

## 2022-07-23 LAB
ALBUMIN SERPL-MCNC: 3.5 GM/DL (ref 3.2–4.5)
ALP SERPL-CCNC: 70 U/L (ref 40–136)
ALT SERPL-CCNC: 13 U/L (ref 0–55)
BASOPHILS # BLD AUTO: 0.1 10^3/UL (ref 0–0.1)
BASOPHILS NFR BLD AUTO: 1 % (ref 0–10)
BILIRUB SERPL-MCNC: 0.6 MG/DL (ref 0.1–1)
BUN/CREAT SERPL: 31
CALCIUM SERPL-MCNC: 9.4 MG/DL (ref 8.5–10.1)
CHLORIDE SERPL-SCNC: 104 MMOL/L (ref 98–107)
CO2 SERPL-SCNC: 27 MMOL/L (ref 21–32)
CREAT SERPL-MCNC: 0.81 MG/DL (ref 0.6–1.3)
EOSINOPHIL # BLD AUTO: 0.2 10^3/UL (ref 0–0.3)
EOSINOPHIL NFR BLD AUTO: 1 % (ref 0–10)
GFR SERPLBLD BASED ON 1.73 SQ M-ARVRAT: 106 ML/MIN
GLUCOSE SERPL-MCNC: 156 MG/DL (ref 70–105)
HCT VFR BLD CALC: 43 % (ref 40–54)
HGB BLD-MCNC: 13.5 G/DL (ref 13.3–17.7)
LYMPHOCYTES # BLD AUTO: 1.4 10^3/UL (ref 1–4)
LYMPHOCYTES NFR BLD AUTO: 13 % (ref 12–44)
MANUAL DIFFERENTIAL PERFORMED BLD QL: NO
MCH RBC QN AUTO: 27 PG (ref 25–34)
MCHC RBC AUTO-ENTMCNC: 31 G/DL (ref 32–36)
MCV RBC AUTO: 87 FL (ref 80–99)
MONOCYTES # BLD AUTO: 1.1 10^3/UL (ref 0–1)
MONOCYTES NFR BLD AUTO: 11 % (ref 0–12)
NEUTROPHILS # BLD AUTO: 7.6 10^3/UL (ref 1.8–7.8)
NEUTROPHILS NFR BLD AUTO: 73 % (ref 42–75)
PLATELET # BLD: 358 10^3/UL (ref 130–400)
PMV BLD AUTO: 10.6 FL (ref 9–12.2)
POTASSIUM SERPL-SCNC: 4.3 MMOL/L (ref 3.6–5)
PROT SERPL-MCNC: 7.4 GM/DL (ref 6.4–8.2)
SODIUM SERPL-SCNC: 147 MMOL/L (ref 135–145)
WBC # BLD AUTO: 10.5 10^3/UL (ref 4.3–11)

## 2022-07-23 RX ADMIN — ASPIRIN SCH MG: 81 TABLET ORAL at 07:42

## 2022-07-23 RX ADMIN — MORPHINE SULFATE PRN MG: 4 INJECTION, SOLUTION INTRAMUSCULAR; INTRAVENOUS at 08:58

## 2022-07-23 RX ADMIN — ENOXAPARIN SODIUM SCH MG: 100 INJECTION SUBCUTANEOUS at 00:02

## 2022-07-23 RX ADMIN — AMLODIPINE BESYLATE SCH MG: 5 TABLET ORAL at 07:42

## 2022-07-23 RX ADMIN — INSULIN ASPART SCH UNIT: 100 INJECTION, SOLUTION INTRAVENOUS; SUBCUTANEOUS at 05:22

## 2022-07-23 RX ADMIN — ACETAMINOPHEN PRN MG: 325 TABLET ORAL at 07:43

## 2022-07-23 RX ADMIN — MORPHINE SULFATE PRN MG: 4 INJECTION, SOLUTION INTRAMUSCULAR; INTRAVENOUS at 16:24

## 2022-07-23 RX ADMIN — METOPROLOL TARTRATE SCH MG: 50 TABLET, FILM COATED ORAL at 07:44

## 2022-07-23 RX ADMIN — INSULIN ASPART SCH UNIT: 100 INJECTION, SOLUTION INTRAVENOUS; SUBCUTANEOUS at 00:01

## 2022-07-23 RX ADMIN — MICONAZOLE NITRATE SCH APPLIC: 20 POWDER TOPICAL at 07:44

## 2022-07-23 RX ADMIN — MORPHINE SULFATE PRN MG: 4 INJECTION, SOLUTION INTRAMUSCULAR; INTRAVENOUS at 18:18

## 2022-07-23 RX ADMIN — MORPHINE SULFATE PRN MG: 4 INJECTION, SOLUTION INTRAMUSCULAR; INTRAVENOUS at 10:56

## 2022-07-23 RX ADMIN — NYSTATIN SCH GM: 100000 CREAM TOPICAL at 07:44

## 2022-07-23 RX ADMIN — FAMOTIDINE SCH MG: 20 TABLET, FILM COATED ORAL at 07:43

## 2022-07-23 RX ADMIN — CLOPIDOGREL BISULFATE SCH MG: 75 TABLET, FILM COATED ORAL at 07:42

## 2022-07-23 NOTE — DIAGNOSTIC IMAGING REPORT
EXAMINATION: 

Chest, 1 view.



HISTORY: 

Pneumonia. Followup.



COMPARISON: 

07/22/2022.



FINDINGS: 

The lung volumes are low. Persistent bibasilar opacities are

seen. No large pleural effusion or pneumothorax is seen. The

cardiomediastinal silhouette is enlarged with loop recorder

overlying the cardiac silhouette. No acute osseous abnormality is

seen.



IMPRESSION: 

1. Unchanged bibasilar opacities with continued low lung volumes.

2. Stable cardiomegaly.



Dictated by: 



  Dictated on workstation # [id number]

## 2022-07-23 NOTE — PROGRESS NOTE - HOSPITALIST
Subjective


HPI/CC On Admission


Date Seen by Provider:  Jul 23, 2022


Time Seen by Provider:  10:30


Patient is a 52yo male, h/o COVID about 1.5y ago and multiple neurologic insults

during hospitalization for Covid at that time (TIA vs strokes).  He has had 

chronic debility since then with several ED visits for weakness and stroke like 

symptoms.  Apparently his sons were at home with him and he had a fall in the 

bathroom with increased weakness to the left side.  Unsure of LOC as sons are 

not here at this time for history.  The patient (with significantly slurred 

speech) is able to tell me that his wife is working in [place] - which is 

consistent with prior visits to our ER.  He does state he has a mild headache.  

Otherwise his speech is so slurred he is very difficult to understand.


Upon my arrival patient exhibited right neglect tongue was sticking out mildly 

swollen without erythema.  The patient is eyes were open and did orient to voice

he was slow to respond and very dysarthric unintelligible speech.  He was able 

to follow simple commands but when asked to use his right hand to touch my 

finger he only moved his left hand and was able to do so with good control of 

the left hand.  His wife came later in the day and reports that he had been 

seeing neurologist Dr. Loja had a rather extensive work-up was started on 

aspirin and Plavix but no apparent large vessel disease was noted.  He had an 

echocardiogram gram and there is been no evidence for atrial fibrillation.  He 

was having no neurologic issues previous to COVID infection in January 2021.  He

had been doing well at home until the night of his admission when his right leg 

apparently gave out he stumbled forward they helped him to the bed and again 

noted that he was dysarthric and his tongue appeared to be swollen.  There were 

no rash issues reported he was confused but there was no loss of consciousness 

and no reported pallor.  He had been feeling well up until that point.


Subjective/Events-last exam


Patient declining with fever


Wife requests comfort care





Objective


Exam


Vital Signs





Vital Signs








  Date Time  Temp Pulse Resp B/P (MAP) Pulse Ox O2 Delivery O2 Flow Rate FiO2


 


7/23/22 10:33 37.3       


 


7/23/22 08:00     82 Vapotherm 30.00 


 


7/23/22 07:45        60


 


7/23/22 07:45  129 18 150/84 (106)    





Capillary Refill : Less Than 3 SecondsLess Than 3 Seconds


General Appearance:  Obese, Other (lethargic)





Results/Procedures


Lab


Laboratory Tests


7/23/22 05:21








Patient resulted labs reviewed.





Assessment/Plan


Assessment and Plan


Assess & Plan/Chief Complaint


Poor prognosis


High risk for PNA








1) Acute CVA with Right hemiparesis and dysarthria


Acute respiratory distress requiring supplemental oxygen via Vapotherm


Angioedema of tongue


   -Carotid ultrasound unremarkable


   -Cardiology consulted, appreciate their recommendations


   -CT head showed atrophy and chronic ischemic microvascular changes with lunar

infarcts in Left basal ganglia


   -CTA showed no large vessel occlusion


   -MRI recommended, but couldn't be done d/t body habitus


   -Speech therapy, Physical therapy and Occupational therapy


   -PEG tube placed





2) Acute respiratory distress requiring supplemental oxygen


   -Stable on 6L high flow nasal cannula





3) Angioedema of tongue


   -Hold lisinopril


   -Speech therapy advised NPO





4) HTN


   - Well controlled on Amplodipine 10 and Metoprolol 100 BID





5) Diabetes


   -SSI


   -Empagliflozin 10mg





Diet-NPO, PEG tube


DVT prophylaxis- SCDs and Lovenox





Dispo: Discharge home with home health and family caregivers. Social workers 

working on home hospital bed.








7/23/22:


Comfort care protocol


Imminent death











MARCO A HERNADEZ DO                Jul 23, 2022 07:05

## 2022-07-24 NOTE — DISCHARGE SUMMARY
Discharge Summary


Hospital Course


Was the Problem List Reviewed?:  Yes


Problems/Dx:  


(1) Acute CVA (cerebrovascular accident)


Status:  Acute


(2) Right hemiparesis


Status:  Acute


(3) Dysphagia due to recent cerebrovascular accident


Status:  Acute


(4) S/P percutaneous endoscopic gastrostomy (PEG) tube placement


(5) Physical debility


(6) Discharge planning issues


(7) Aphasia due to acute cerebrovascular accident (CVA)


Status:  Acute


(8) Acute and chronic respiratory failure with hypoxia


Status:  Acute


(9) HTN (hypertension)


Status:  Chronic


Qualifiers:  


   Qualified Codes:  I10 - Essential (primary) hypertension


(10) Diabetes mellitus


Status:  Chronic


Qualifiers:  


   Qualified Codes:  E11.9 - Type 2 diabetes mellitus without complications


(11) Dysarthria


Status:  Acute


(12) Left-sided weakness


Status:  Acute


Hospital Course


Date of Admission: Jul 10, 2022 at 16:19 


Admission Diagnosis :  





Family Physician/Provider: [facility]  





Date of Discharge: 22 


Discharge Diagnosis: subacute CVA with right sided weakness, dysphagia requiring

PEG, resp failure with PNA








Hospital Course:


Lengthy course after he was admitted for a subacute CVA with right sided 

weakness and dysphagia and dysarthria adding to his severe debility following 

COVID 1 year ago with left sided weakness. Untreated SHYAM along with non-

compliance with other co-morbidities led him to a very progressive decline 

ending with DNR and hospice conversation but presumed PNA ultimately caused his 

death and prompted end of life comfort care and he passed away














Labs and Pending Lab Test:





Microbiology


22 MRSA Screen - Final, Complete


          MRSA not isolated





Home Meds


Active


Reported


Farxiga (Dapagliflozin Propanediol) 10 Mg Tablet 10 Mg PO HS


Victoza 3-Jose (Liraglutide) 0.6 Mg/0.1 Ml (18 Mg/3 Ml) Pen.injctr 1.8 Mg SQ HS


Vitamin B-12 (Cyanocobalamin (Vitamin B-12)) 50 Mcg Tablet 50 Mcg PO DAILY


Aspirin EC (Aspirin) 81 Mg Tablet.dr 81 Mg PO DAILY


Famotidine 20 Mg Tablet 20 Mg PO BID


Clopidogrel (Clopidogrel Bisulfate) 75 Mg Tablet 75 Mg PO DAILY


Lisinopril 20 Mg Tablet 20 Mg PO DAILY


Atorvastatin Calcium 40 Mg Tablet 40 Mg PO DAILY


Levemir Flextouch (Insulin Detemir) 100 Unit/Ml (3 Ml) Insuln.pen 18 Units SC 

BID


Assessment/Pt Instructions





Discharge Planning:  <30 minutes discharge planning





Discharge Physical Examination


Vital Signs





Vital Signs








  Date Time  Temp Pulse Resp B/P (MAP) Pulse Ox O2 Delivery O2 Flow Rate FiO2


 


22 10:33 37.3       


 


22 08:00     82 Vapotherm 30.00 


 


22 07:45        60


 


22 07:45  129 18 150/84 (106)    








Allergies:  


Coded Allergies:  


     ACE Inhibitors (Verified  Allergy, Unknown, 22)


   angioedema





Discharge Summary


Date of Admission


Jul 10, 2022 at 16:19


Date of Discharge


2022 at 19:20


Admission Diagnosis


1.  Acute left-sided CVA with right hemiparesis and dysarthria and reportedly 

right handed white male.  CTA of the head reveals no significant blockage 

reported extensive recent work-up negative.  The patient had been on aspirin and

Plavix for the past year according to wife.  The stroke team at  was consulted

with no further recommendations other than continuing aspirin and Plavix and 

obtaining an MRI when available which we will do with and without contrast in 

the morning.


2.  Possible angioedema of the tongue will hold lisinopril and continue to 

monitor in the unit for any evidence of airways obstruction.  For this reason we

will initiate n.p.o. status as well as a stroke with speech consultation for 

swallow evaluation in the morning.


3.  History of hypertension holding antihypertensive medication for now.


Comfort Measures/


End of Life Care:  Comfort Measures





Discharge Diagnosis


Poor prognosis


High risk for PNA








1) Acute CVA with Right hemiparesis and dysarthria


Acute respiratory distress requiring supplemental oxygen via Vapotherm


Angioedema of tongue


   -Carotid ultrasound unremarkable


   -Cardiology consulted, appreciate their recommendations


   -CT head showed atrophy and chronic ischemic microvascular changes with lunar

infarcts in Left basal ganglia


   -CTA showed no large vessel occlusion


   -MRI recommended, but couldn't be done d/t body habitus


   -Speech therapy, Physical therapy and Occupational therapy


   -PEG tube placed





2) Acute respiratory distress requiring supplemental oxygen


   -Stable on 6L high flow nasal cannula





3) Angioedema of tongue


   -Hold lisinopril


   -Speech therapy advised NPO





4) HTN


   - Well controlled on Amplodipine 10 and Metoprolol 100 BID





5) Diabetes


   -SSI


   -Empagliflozin 10mg





Diet-NPO, PEG tube


DVT prophylaxis- SCDs and Lovenox





Dispo: Discharge home with home health and family caregivers. Social workers 

working on home hospital bed.








22:


Comfort care protocol


Imminent death











MARCO A HERNADEZ DO                2022 21:30

## 2022-08-25 NOTE — DISCHARGE SUMMARY
Diagnosis/Chief Complaint


Date of Admission


Wilber 10, 2021 at 21:00


Date of Discharge


2021


Admission Diagnosis


Admission Diagnosis


Covid-19


Hypoxia


Non Insulin Dependent DM


HTN


Obesity


BMI 39





Discharge Diagnosis


See Above





Discharge Summary-Simple/Stand


Consultations


Dr Caceres: Pulm


Discharge Physical Examination


Allergies:  


Coded Allergies:  


     No Known Drug Allergies (Unverified , 13)


Vitals & I&Os





Vital Sign - Last 12Hours








  Date Time  Temp Pulse Resp B/P (MAP) Pulse Ox O2 Delivery O2 Flow Rate FiO2


 


21 11:04     90 Room Air  


 


21 08:00 35.7 104 18 166/94 (118)    


 


21 19:54        93


 


21 10:55       3.00 














Intake and Output 


 


 21





 00:00


 


Intake Total 900 ml


 


Balance 900 ml








General Appearance:  Alert, Oriented X3, Cooperative, Other (Hard of hearing)


Respiratory:  Clear to Auscultation, Normal Air Movement, Other (Normal work of 

breathing)


Cardiovascular:  Regular Rate, No Murmurs


Abdominal:  Normal Bowel Sounds, Soft, No Tenderness


Extremities:  No Edema, No Tenderness/Swelling


Skin:  No Rashes


Neuro:  Strength at 5/5 X4 Ext, Sensation Intact, Cranial Nerves 3-12 NL


Psych/Mental Status:  Mental Status NL, Mood NL





Hospital Course


Was the Problem List Reviewed?:  Yes


See final discharge diagnosis.





Discussion & Recommendations


49 yo M that presented with worsening shortness of breath and hypoxia after 

testing positive with COVID-19. Patient was found to have A1c >11 and HTN. Previ

ously not under treatment for either. Was started on medications for DM and HTN.

Patient was able to titrate off oxygen at rest, with activity and at night and 

did not have any oxygen needs. Will have close f.u with new PCP Jairo Brown.





Discharge


Condition at discharge


Stable


Instructions to patient/family


Please see electronic discharge instructions given to patient.


Discharge Medications


Reviewed and agree with Discharge Medication list on patient's Discharge 

Instruction sheet





Clinical Quality Measures


DVT/VTE Risk/Contraindication:


Risk Factor Score Per Nursin


RFS Level Per Nursing on Admit:  4+=Very High





Copy


Copies To 1:   SIMON, TASNEEM Fermin MD               2021 11:11 Undermining Type: Entire Wound

## 2023-01-03 NOTE — PROGRESS NOTE
Pt's daughter Coleen called. Coleen stated that pt's INR used to be done by the company called [employer]. The account is currently closed, they sent a letter to Dr. Segovia to prescribe more. No response from Dr. Segovia.     Please advise  Coleen: [phone]    Subjective


Subjective/Events-last exam


Patient gives thumbs up that he is feeling well and thumbs down that he is in 

pain.





Per nurse no overnight events.


Review of Systems


Denies any pain





Objective


Exam


Last Set of Vital Signs





Vital Signs








  Date Time  Temp Pulse Resp B/P (MAP) Pulse Ox O2 Delivery O2 Flow Rate FiO2


 


22 08:05 36.7 109 22 164/85 (111) 93 High Flow N/C 6.00 


 


22 11:52        98





Capillary Refill : Less Than 3 SecondsLess Than 3 Seconds


I&O











Intake and Output0 


 


 22





 23:59


 


Intake Total 1890 ml


 


Output Total 1750 ml


 


Balance 140 ml


 


 


 


Intake Oral 0 ml


 


Tube Feeding 1200 ml


 


Other 690 ml


 


Output Urine Total 1750 ml








General:  Alert, No Acute Distress


Lungs:  Normal Air Movement, Other (course breath sounds)


Heart:  Regular Rate, No Murmurs


Abdomen:  Normal Bowel Sounds, Soft, No Tenderness, Other (PEG tube in place)


Extremities:  No Edema, No Tenderness/Swelling


Neuro:  Other (Right hemiparesis UE and LE)





Results/Procedures


Lab


Laboratory Tests


22 12:14: Glucometer 142H


22 17:31: Glucometer 146H


22 23:29: Glucometer 143H


22 05:17: Glucometer 145H


22 07:42: 


White Blood Count 11.6H, Red Blood Count 4.89, Hemoglobin 13.5, Hematocrit 42, 

Mean Corpuscular Volume 87, Mean Corpuscular Hemoglobin 28, Mean Corpuscular 

Hemoglobin Concent 32, Red Cell Distribution Width 13.4, Platelet Count 419H, 

Mean Platelet Volume 10.1, Immature Granulocyte % (Auto) 1, Neutrophils (%) 

(Auto) 69, Lymphocytes (%) (Auto) 15, Monocytes (%) (Auto) 11, Eosinophils (%) 

(Auto) 4, Basophils (%) (Auto) 1, Neutrophils # (Auto) 8.0H, Lymphocytes # 

(Auto) 1.8, Monocytes # (Auto) 1.3H, Eosinophils # (Auto) 0.4H, Basophils # 

(Auto) 0.1, Immature Granulocyte # (Auto) 0.1, Sodium Level 148H, Potassium 

Level 3.8, Chloride Level 104, Carbon Dioxide Level 27, Anion Gap 17H, Blood 

Urea Nitrogen 21H, Creatinine 0.73, Estimat Glomerular Filtration Rate 109, 

BUN/Creatinine Ratio 29, Glucose Level 141H, Calcium Level 9.2, Corrected 

Calcium 9.7, Total Bilirubin 0.5, Aspartate Amino Transf (AST/SGOT) 13, Alanine 

Aminotransferase (ALT/SGPT) 11, Alkaline Phosphatase 71, Total Protein 7.2, 

Albumin 3.4


22 11:34: Glucometer 150H





Microbiology


22 MRSA Screen - Final, Complete


          MRSA not isolated


Radiology


[place], [place]





NAME:   MURRAY PICKETT


MED REC#:   [id number]


ACCOUNT#:   [id number]


PT STATUS:   REG ER


:   1970


PHYSICIAN:   JOHN OJEDA


ADMIT DATE:   22/ER


***Draft***


Date of Exam:22





CT HEAD WO-R/O STROKE








PROCEDURE: CT head w/o r/o stroke.





TECHNIQUE: Multiple contiguous axial images were obtained through


the brain without the use of intravenous contrast. Auto Exposure


Controls were utilized during the CT exam to meet ALARA standards


for radiation dose reduction. 





INDICATION: Stroke.





COMPARISON: Prior examination from 2022.





FINDINGS: The ventricles and sulci are within normal limits.


There are a few lacunar infarcts. There is no hydrocephalus.


There is no midline shift. There is no mass, hemorrhage or


extra-axial fluid collection. The calvarium is intact. Sinuses


and mastoid air cells are clear.





IMPRESSION: Atrophy and some mild chronic microvascular ischemic


disease with a few lacunar infarcts, particularly in the left


basal ganglia and to a lesser degree right basal ganglia. No


other acute intracranial abnormality. If there is high clinical


concern for an acute CVA further evaluation with MRI should be


considered. 





  Dictated on workstation # [id number]








Dict:   22


Trans:   22


[facility] [id number]





Interpreted by:     JEFF ARMSTRONG MD


Electronically signed by:NAME:   MURRAY PICKETT


MED REC#:   [id number]


ACCOUNT#:   [id number]


PT STATUS:   ADM IN


:   1970


PHYSICIAN:   MARCO A HERNADEZ DO


ADMIT DATE:   07/10/22/[facility]


                                   ***Draft***


Date of Exam:22





US CAROTID RUBÉN COMPLETE [id number]








PROCEDURE: 


US carotid duplex bilateral.





TECHNIQUE: 


Multiple Real-time grayscale images were obtained over the


carotid arteries in various projections bilaterally. Additional


spectral analysis and color Doppler duplex images were also


obtained.





INDICATION: 


Followup post-CVA.





FINDINGS: 


The left carotid system and vertebral system could not be


adequately interrogated due to patient position and pain.





Real-time imaging shows minimal atherosclerotic plaquing within


the right carotid bulb and internal and external carotid


arteries. Waveforms and peak velocities are normal with normal


carotid ratios.





IMPRESSION:


1. Very limited exam. The right carotid artery shows no


hemodynamic disease with minimal plaquing.


2. Left carotid system and vertebral arteries were not adequately


evaluated due to patient's pain and position.


3. Previous CT angiography of the neck on 2022 suggested


the carotid and vertebral arteries to be widely patent without


significant stenosis.





Parameters based on the consensus panel Gray-Scale and Doppler


ultrasound criteria published 


2003, Radiology, Volume 229. 





DOPPLER (peak systolic velocity M/S 


    


                Right    Left





CCA              1.12     1.33





ICA Proximal      .50     NOT SEEN





ICA Mid           .63     NOT SEEN


 


ICA Distal        .76     NOT SEEN





RATIO            .68     N/A





ECA              .93     NOT SEEN





VERT              NOT SEEN     NOT SEEN








  Dictated on workstation # [id number]








Dict:   22 1034


Trans:   22 1047


 [id number]





Interpreted by:     CUAUHTEMOC GOMEZ MD


Electronically signed by:





Assessment/Plan


Assessment/Plan





(1) Acute CVA (cerebrovascular accident)


Status:  Acute


Assessment & Plan:  : Extensive workup of cause of multiple TIAs/CVAs with 

unknown cause, cardiac workup also negative,  stroke team consulted upon 

arrival and patient continued on DAPT, severe dyphagia with PEG tube placed this

admission, PT/OT/speech


: order for hosp bed, home suction in preperation for home with HH


: Waiting on DME, they are checking with insurance regarding SNF


: DME has been ordered, MARY working on pre approval, he is medically stable 





(2) Right hemiparesis


Status:  Acute


(3) Dysphagia due to recent cerebrovascular accident


Status:  Acute


Assessment & Plan:  - OT





(4) Aphasia due to acute cerebrovascular accident (CVA)


Status:  Acute


Assessment & Plan:  -OT/speech





(5) Acute and chronic respiratory failure with hypoxia


Status:  Acute


Assessment & Plan:  : requiring HF oxygen, will titrate as tolerated, MAT 

protocol





(6) HTN (hypertension)


Status:  Chronic


Assessment & Plan:  : been normotensive, will restart home meds thru PEG 

tube


Qualifiers:  


   Qualified Codes:  I10 - Essential (primary) hypertension


(7) Diabetes mellitus


Status:  Chronic


Assessment & Plan:  : SSI


Qualifiers:  


   Qualified Codes:  E11.9 - Type 2 diabetes mellitus without complications


(8) S/P percutaneous endoscopic gastrostomy (PEG) tube placement


Assessment & Plan:  : Bolus feeding Glucerna 5 cans daily with 90 ml free 

water before and after each meal


: Sodium trending up, continue to monitor, may need to adjust free water 

flushes





(9) DVT prophylaxis


Status:  Acute


Assessment & Plan:  - SCDs, lovenox





(10) Discharge planning issues


Assessment & Plan:  : Planning for d/c home with HH and family caregivers, 

will need hospital bed, new oxygen start, suction














TASNEEM MOELLER MD               2022 11:55